# Patient Record
Sex: FEMALE | Race: WHITE | Employment: FULL TIME | URBAN - NONMETROPOLITAN AREA
[De-identification: names, ages, dates, MRNs, and addresses within clinical notes are randomized per-mention and may not be internally consistent; named-entity substitution may affect disease eponyms.]

---

## 2017-06-23 ENCOUNTER — OFFICE VISIT (OUTPATIENT)
Dept: OBGYN | Age: 57
End: 2017-06-23
Payer: COMMERCIAL

## 2017-06-23 VITALS
WEIGHT: 155 LBS | BODY MASS INDEX: 26.46 KG/M2 | SYSTOLIC BLOOD PRESSURE: 129 MMHG | HEIGHT: 64 IN | TEMPERATURE: 98.9 F | HEART RATE: 94 BPM | DIASTOLIC BLOOD PRESSURE: 92 MMHG

## 2017-06-23 DIAGNOSIS — Z12.4 SCREENING FOR CERVICAL CANCER: ICD-10-CM

## 2017-06-23 DIAGNOSIS — N81.10 FEMALE BLADDER PROLAPSE: ICD-10-CM

## 2017-06-23 DIAGNOSIS — Z01.419 WOMEN'S ANNUAL ROUTINE GYNECOLOGICAL EXAMINATION: Primary | ICD-10-CM

## 2017-06-23 DIAGNOSIS — Z12.31 ENCOUNTER FOR SCREENING MAMMOGRAM FOR BREAST CANCER: ICD-10-CM

## 2017-06-23 DIAGNOSIS — N90.4 LICHEN SCLEROSUS ET ATROPHICUS OF THE VULVA: ICD-10-CM

## 2017-06-23 DIAGNOSIS — N89.8 VAGINAL ITCHING: ICD-10-CM

## 2017-06-23 DIAGNOSIS — N81.6 POSTERIOR VAGINAL WALL PROLAPSE: ICD-10-CM

## 2017-06-23 DIAGNOSIS — N81.4 UTERINE PROLAPSE: ICD-10-CM

## 2017-06-23 PROCEDURE — 99203 OFFICE O/P NEW LOW 30 MIN: CPT | Performed by: OBSTETRICS & GYNECOLOGY

## 2017-06-23 PROCEDURE — 99386 PREV VISIT NEW AGE 40-64: CPT | Performed by: OBSTETRICS & GYNECOLOGY

## 2017-06-23 RX ORDER — CLOBETASOL PROPIONATE 0.5 MG/G
OINTMENT TOPICAL
Qty: 1 TUBE | Refills: 3 | Status: SHIPPED | OUTPATIENT
Start: 2017-06-23 | End: 2018-08-09

## 2017-06-23 RX ORDER — ERGOCALCIFEROL 1.25 MG/1
50000 CAPSULE ORAL
COMMUNITY
End: 2021-08-23

## 2017-06-23 ASSESSMENT — ENCOUNTER SYMPTOMS
GASTROINTESTINAL NEGATIVE: 1
RESPIRATORY NEGATIVE: 1
EYES NEGATIVE: 1
ALLERGIC/IMMUNOLOGIC NEGATIVE: 1

## 2017-06-27 ENCOUNTER — TELEPHONE (OUTPATIENT)
Dept: OBGYN | Age: 57
End: 2017-06-27

## 2017-07-03 ENCOUNTER — TELEPHONE (OUTPATIENT)
Dept: OBGYN | Age: 57
End: 2017-07-03

## 2017-07-14 ENCOUNTER — HOSPITAL ENCOUNTER (OUTPATIENT)
Dept: WOMENS IMAGING | Age: 57
Discharge: HOME | End: 2017-07-14
Payer: COMMERCIAL

## 2017-07-14 DIAGNOSIS — Z12.31 ENCOUNTER FOR SCREENING MAMMOGRAM FOR BREAST CANCER: ICD-10-CM

## 2017-07-14 PROCEDURE — 77063 BREAST TOMOSYNTHESIS BI: CPT

## 2017-08-07 ENCOUNTER — PROCEDURE VISIT (OUTPATIENT)
Dept: OBGYN | Age: 57
End: 2017-08-07
Payer: COMMERCIAL

## 2017-08-07 VITALS
DIASTOLIC BLOOD PRESSURE: 80 MMHG | BODY MASS INDEX: 26.29 KG/M2 | HEIGHT: 64 IN | WEIGHT: 154 LBS | HEART RATE: 97 BPM | TEMPERATURE: 99.1 F | SYSTOLIC BLOOD PRESSURE: 120 MMHG

## 2017-08-07 DIAGNOSIS — R87.810 CERVICAL HIGH RISK HPV (HUMAN PAPILLOMAVIRUS) TEST POSITIVE: Primary | ICD-10-CM

## 2017-08-07 PROCEDURE — 57456 ENDOCERV CURETTAGE W/SCOPE: CPT | Performed by: OBSTETRICS & GYNECOLOGY

## 2017-11-13 ENCOUNTER — TELEPHONE (OUTPATIENT)
Dept: OBGYN | Age: 57
End: 2017-11-13

## 2017-11-13 RX ORDER — FLUCONAZOLE 150 MG/1
150 TABLET ORAL DAILY
Qty: 4 TABLET | Refills: 0 | Status: SHIPPED | OUTPATIENT
Start: 2017-11-13 | End: 2018-08-09 | Stop reason: ALTCHOICE

## 2017-11-13 NOTE — TELEPHONE ENCOUNTER
Pt c/o S/s of yeast infection. She complains of vaginal discharge for 3 days and red itchy labia; non bloody discharge, without pelvic pain or abnormal vaginal bleeding. Antibiotics recently. Pharmacy verified.

## 2017-11-13 NOTE — TELEPHONE ENCOUNTER
patient believes she has a yeast infection, she would  like to see if she could get some medication are does she need appt.

## 2018-01-05 ENCOUNTER — TELEPHONE (OUTPATIENT)
Dept: OBGYN | Age: 58
End: 2018-01-05

## 2018-01-05 NOTE — TELEPHONE ENCOUNTER
Update from Dr. Meaghan Delaney's office:  --Patient was referred for bladder prolapse, also decided to have hysterectomy at the time of bladder surgery. --Pathology from cervix - [facility]  --Repeat pap in office - [facility]  --Colpo of vaginal cuff - [facility]    The Nurse Nicolle Dempsey, called our office to ask if we wanted to follow the patient for further care or refer her to a GYN/ONC. Dr. Felipe Parikh was contacted and will follow the patient here. Message was left on NP's personal cell phone.

## 2018-03-07 ENCOUNTER — OFFICE VISIT (OUTPATIENT)
Dept: GASTROENTEROLOGY | Facility: CLINIC | Age: 58
End: 2018-03-07

## 2018-03-07 VITALS
SYSTOLIC BLOOD PRESSURE: 128 MMHG | TEMPERATURE: 98 F | HEART RATE: 70 BPM | WEIGHT: 157 LBS | OXYGEN SATURATION: 99 % | BODY MASS INDEX: 26.8 KG/M2 | DIASTOLIC BLOOD PRESSURE: 80 MMHG | HEIGHT: 64 IN

## 2018-03-07 DIAGNOSIS — Z86.010 HISTORY OF COLON POLYPS: Primary | ICD-10-CM

## 2018-03-07 DIAGNOSIS — Z80.0 FAMILY HISTORY OF COLON CANCER: ICD-10-CM

## 2018-03-07 PROBLEM — Z86.0100 HISTORY OF COLON POLYPS: Status: ACTIVE | Noted: 2018-03-07

## 2018-03-07 PROCEDURE — S0285 CNSLT BEFORE SCREEN COLONOSC: HCPCS | Performed by: NURSE PRACTITIONER

## 2018-03-07 RX ORDER — QUINAPRIL HCL AND HYDROCHLOROTHIAZIDE 20; 12.5 MG/1; MG/1
1 TABLET ORAL DAILY
COMMUNITY
End: 2020-12-08

## 2018-03-07 NOTE — PROGRESS NOTES
Chief Complaint   Patient presents with   • Colonoscopy     4-1-14 colon had seven polyps     Subjective   HPI    Martha Parra is a 57 y.o. female who presents to office for preventative maintenance.  There is  a personal history of colon polyps.  There is not a history of colon cancer.  She does not have complaints of nausea/vomiting, change in bowels, weight loss, no BRBPR, no melena.  There is a family history of colon cancer-mother.  There is not a family history of colon polyps.  Pt last colonoscopy-2014 .  Bowels do move on regular basis.    CScope (Dr Farris) 2014 hyperplastic rectal polyp - total of 7 polyp removed from rectum      Past Medical History:   Diagnosis Date   • Diabetes    • Hypertension      Past Surgical History:   Procedure Laterality Date   • COLONOSCOPY W/ POLYPECTOMY  03/28/2014    several rectal polyps which were fairly nonworrisome and all removed   • HYSTERECTOMY     • TUBAL ABDOMINAL LIGATION       Outpatient Prescriptions Marked as Taking for the 3/7/18 encounter (Office Visit) with SMITHA Dowell   Medication Sig Dispense Refill   • atorvastatin (LIPITOR) 10 MG tablet Take 10 mg by mouth daily.     • clotrimazole-betamethasone (LOTRISONE) 1-0.05 % cream Apply  topically 2 (Two) Times a Day. 1 each 3   • conjugated estrogens (PREMARIN) 0.625 MG/GM vaginal cream Insert  into the vagina 3 (Three) Times a Week. 1 each 3   • meloxicam (MOBIC) 15 MG tablet 1 TABLET BY MOUTH DAILY  2   • metFORMIN (GLUCOPHAGE) 500 MG tablet Take 500 mg by mouth 2 times daily (with meals).     • quinapril (ACCUPRIL) 20 MG tablet Take 20 mg by mouth nightly.     • quinapril-hydrochlorothiazide (ACCURETIC) 20-12.5 MG per tablet Take 1 tablet by mouth Daily.     • tolterodine LA (DETROL LA) 4 MG 24 hr capsule Take 4 mg by mouth daily.     • vitamin D (ERGOCALCIFEROL) 90317 UNITS capsule capsule TAKE ONE CAPSULE BY MOUTH TWICE A WEEK  5     No Known Allergies  Social History     Social History    • Marital status:      Spouse name: N/A   • Number of children: N/A   • Years of education: N/A     Occupational History   • Not on file.     Social History Main Topics   • Smoking status: Never Smoker   • Smokeless tobacco: Never Used   • Alcohol use No   • Drug use: No   • Sexual activity: Not Currently     Other Topics Concern   • Not on file     Social History Narrative     Family History   Problem Relation Age of Onset   • Heart disease Father    • Colon cancer Mother    • No Known Problems Sister    • No Known Problems Son    • No Known Problems Daughter    • Colon cancer Maternal Aunt      Review of Systems   Constitutional: Negative for fatigue, fever and unexpected weight change.   HENT: Negative for hearing loss, sore throat and voice change.    Eyes: Negative for visual disturbance.   Respiratory: Negative for cough, shortness of breath and wheezing.    Cardiovascular: Negative for chest pain and palpitations.   Gastrointestinal: Negative for abdominal pain, blood in stool and vomiting.   Endocrine: Negative for polydipsia and polyuria.   Genitourinary: Negative for difficulty urinating, dysuria, hematuria and urgency.   Musculoskeletal: Negative for joint swelling and myalgias.   Skin: Negative for color change, rash and wound.   Neurological: Negative for dizziness, tremors, seizures and syncope.   Hematological: Does not bruise/bleed easily.   Psychiatric/Behavioral: Negative for agitation and confusion. The patient is not nervous/anxious.      Objective   Vitals:    03/07/18 0806   BP: 128/80   Pulse: 70   Temp: 98 °F (36.7 °C)   SpO2: 99%     Physical Exam   Constitutional: She is oriented to person, place, and time. She appears well-developed and well-nourished.   HENT:   Head: Normocephalic and atraumatic.   Eyes: Conjunctivae are normal. Pupils are equal, round, and reactive to light. No scleral icterus.   Neck: No JVD present. No thyroid mass and no thyromegaly present.   Cardiovascular:  Normal rate, regular rhythm and normal heart sounds.  Exam reveals no gallop and no friction rub.    No murmur heard.  Pulmonary/Chest: Effort normal and breath sounds normal. No accessory muscle usage. No respiratory distress. She has no wheezes. She has no rales.   Abdominal: Soft. Bowel sounds are normal. She exhibits no distension, no ascites and no mass. There is no splenomegaly or hepatomegaly. There is no tenderness. There is no rebound and no guarding.   Genitourinary:   Genitourinary Comments: Rectal-Did not examine   Musculoskeletal: Normal range of motion. She exhibits no edema.   Neurological: She is alert and oriented to person, place, and time.   Deemed a reliable historian, able to converse without difficulty and able to move all extremities without difficulty   Skin: Skin is warm and dry.   Psychiatric: She has a normal mood and affect. Her behavior is normal.     Imaging Results (most recent)     None        Body mass index is 26.95 kg/(m^2).    Assessment/Plan   Martha was seen today for colonoscopy.    Diagnoses and all orders for this visit:    History of colon polyps  -     Case Request; Standing  -     Implement Anesthesia Orders Day of Procedure; Standing  -     Obtain Informed Consent; Standing  -     Sod Picosulfate-Mag Ox-Cit Acd (CLENPIQ) 10-3.5-12 MG-GM -GM/160ML solution; Take 1 each by mouth Take As Directed.  -     Case Request    Family history of colon cancer  Comments:  mother      COLONOSCOPY WITH ANESTHESIA (N/A)    Advised pt to stop ASA day prior to procedure and to stop use of NSAIDs, Fish Oil, and MV 5 days prior to procedure.  Tylenol based products are ok to take.  Pt verbalized understanding.    All risks, benefits, alternatives, and indications of colonoscopy procedure have been discussed with the patient. Risks to include perforation of the colon requiring possible surgery or colostomy, risk of bleeding from biopsies or removal of colon tissue, possibility of missing a  colon polyp or cancer, or adverse drug reaction.  Benefits to include the diagnosis and management of disease of the colon and rectum. Alternatives to include barium enema, radiographic evaluation, lab testing or no intervention. Pt verbalizes understanding and agrees.     Patient's BMI is above normal parameters. Follow-up plan includes:  no follow-up required.        There are no Patient Instructions on file for this visit.

## 2018-04-18 ENCOUNTER — ANESTHESIA (OUTPATIENT)
Dept: GASTROENTEROLOGY | Facility: HOSPITAL | Age: 58
End: 2018-04-18

## 2018-04-18 ENCOUNTER — HOSPITAL ENCOUNTER (OUTPATIENT)
Facility: HOSPITAL | Age: 58
Setting detail: HOSPITAL OUTPATIENT SURGERY
Discharge: HOME | End: 2018-04-18
Attending: INTERNAL MEDICINE | Admitting: INTERNAL MEDICINE

## 2018-04-18 ENCOUNTER — ANESTHESIA EVENT (OUTPATIENT)
Dept: GASTROENTEROLOGY | Facility: HOSPITAL | Age: 58
End: 2018-04-18

## 2018-04-18 ENCOUNTER — TELEPHONE (OUTPATIENT)
Dept: GASTROENTEROLOGY | Facility: CLINIC | Age: 58
End: 2018-04-18

## 2018-04-18 VITALS
RESPIRATION RATE: 14 BRPM | TEMPERATURE: 97.2 F | BODY MASS INDEX: 27.66 KG/M2 | OXYGEN SATURATION: 99 % | HEART RATE: 75 BPM | HEIGHT: 64 IN | WEIGHT: 162 LBS | DIASTOLIC BLOOD PRESSURE: 66 MMHG | SYSTOLIC BLOOD PRESSURE: 130 MMHG

## 2018-04-18 DIAGNOSIS — Z86.010 HISTORY OF COLON POLYPS: ICD-10-CM

## 2018-04-18 LAB — GLUCOSE BLDC GLUCOMTR-MCNC: 142 MG/DL (ref 70–130)

## 2018-04-18 PROCEDURE — 45385 COLONOSCOPY W/LESION REMOVAL: CPT | Performed by: INTERNAL MEDICINE

## 2018-04-18 PROCEDURE — 25010000002 PROPOFOL 10 MG/ML EMULSION: Performed by: NURSE ANESTHETIST, CERTIFIED REGISTERED

## 2018-04-18 PROCEDURE — 82962 GLUCOSE BLOOD TEST: CPT

## 2018-04-18 PROCEDURE — 88305 TISSUE EXAM BY PATHOLOGIST: CPT | Performed by: INTERNAL MEDICINE

## 2018-04-18 RX ORDER — SODIUM CHLORIDE 0.9 % (FLUSH) 0.9 %
3 SYRINGE (ML) INJECTION AS NEEDED
Status: DISCONTINUED | OUTPATIENT
Start: 2018-04-18 | End: 2018-04-18 | Stop reason: HOSPADM

## 2018-04-18 RX ORDER — LIDOCAINE HYDROCHLORIDE 20 MG/ML
INJECTION, SOLUTION INFILTRATION; PERINEURAL AS NEEDED
Status: DISCONTINUED | OUTPATIENT
Start: 2018-04-18 | End: 2018-04-18 | Stop reason: SURG

## 2018-04-18 RX ORDER — SODIUM CHLORIDE 9 MG/ML
500 INJECTION, SOLUTION INTRAVENOUS CONTINUOUS PRN
Status: DISCONTINUED | OUTPATIENT
Start: 2018-04-18 | End: 2018-04-18 | Stop reason: HOSPADM

## 2018-04-18 RX ORDER — PROPOFOL 10 MG/ML
VIAL (ML) INTRAVENOUS AS NEEDED
Status: DISCONTINUED | OUTPATIENT
Start: 2018-04-18 | End: 2018-04-18 | Stop reason: SURG

## 2018-04-18 RX ADMIN — LIDOCAINE HYDROCHLORIDE 50 MG: 20 INJECTION, SOLUTION INFILTRATION; PERINEURAL at 08:22

## 2018-04-18 RX ADMIN — PROPOFOL 200 MG: 10 INJECTION, EMULSION INTRAVENOUS at 08:22

## 2018-04-18 RX ADMIN — SODIUM CHLORIDE: 0.9 INJECTION, SOLUTION INTRAVENOUS at 08:14

## 2018-04-18 NOTE — ANESTHESIA POSTPROCEDURE EVALUATION
Patient: Martha Parra    Procedure Summary     Date:  04/18/18 Room / Location:  [facility] ENDOSCOPY 2 /  [facility]    Anesthesia Start:  0820 Anesthesia Stop:  0842    Procedure:  COLONOSCOPY WITH ANESTHESIA (N/A ) Diagnosis:       History of colon polyps      (History of colon polyps [Z86.010])    Surgeon:  Chandler Farris DO Provider:  Ruperto Rodríguez CRNA    Anesthesia Type:  general ASA Status:  2          Anesthesia Type: general  Last vitals  BP   120/78 (04/18/18 0734)   Temp   97.2 °F (36.2 °C) (04/18/18 0734)   Pulse   75 (04/18/18 0734)   Resp   20 (04/18/18 0734)     SpO2   98 % (04/18/18 0734)     Post Anesthesia Care and Evaluation    Patient location during evaluation: PHASE II  Level of consciousness: awake and alert  Pain management: adequate  Airway patency: patent  Anesthetic complications: No anesthetic complications    Cardiovascular status: acceptable  Respiratory status: acceptable  Hydration status: acceptable

## 2018-04-18 NOTE — ANESTHESIA PREPROCEDURE EVALUATION
Anesthesia Evaluation     Patient summary reviewed   no history of anesthetic complications:  NPO Solid Status: > 8 hours  NPO Liquid Status: > 4 hours           Airway   Mallampati: I  TM distance: >3 FB  Neck ROM: full  No difficulty expected  Dental - normal exam     Pulmonary    (+) sleep apnea (noncompliant with cpap),   (-) asthma, not a smoker  Cardiovascular   Exercise tolerance: good (4-7 METS)    (+) hypertension, hyperlipidemia,   (-) past MI, angina      Neuro/Psych  (-) seizures, TIA, CVA  GI/Hepatic/Renal/Endo    (+)   diabetes mellitus,   (-) liver disease, no renal disease    Musculoskeletal     Abdominal    Substance History      OB/GYN          Other   (+) arthritis                     Anesthesia Plan    ASA 2     general   total IV anesthesia  intravenous induction   Anesthetic plan and risks discussed with patient.

## 2018-04-18 NOTE — H&P
[facility]  Pre Procedure History & Physical    Chief Complaint:   Polyps    Subjective     HPI:   pOLYPS    Past Medical History:   Past Medical History:   Diagnosis Date   • Arthritis    • Diabetes    • Hyperlipidemia    • Hypertension        Past Surgical History:  Past Surgical History:   Procedure Laterality Date   • COLONOSCOPY W/ POLYPECTOMY  03/28/2014    several rectal polyps which were fairly nonworrisome and all removed   • HYSTERECTOMY     • TUBAL ABDOMINAL LIGATION         Family History:  Family History   Problem Relation Age of Onset   • Heart disease Father    • Colon cancer Mother    • No Known Problems Sister    • No Known Problems Son    • No Known Problems Daughter    • Colon cancer Maternal Aunt        Social History:   reports that she has never smoked. She has never used smokeless tobacco. She reports that she does not drink alcohol or use drugs.    Medications:   Prior to Admission medications    Medication Sig Start Date End Date Taking? Authorizing Provider   atorvastatin (LIPITOR) 10 MG tablet Take 10 mg by mouth daily.   Yes Historical Provider, MD   meloxicam (MOBIC) 15 MG tablet 1 TABLET BY MOUTH DAILY 11/4/16  Yes Historical Provider, MD   metFORMIN (GLUCOPHAGE) 500 MG tablet Take 500 mg by mouth 2 times daily (with meals).   Yes Historical Provider, MD   quinapril (ACCUPRIL) 20 MG tablet Take 20 mg by mouth nightly.   Yes Historical Provider, MD   quinapril-hydrochlorothiazide (ACCURETIC) 20-12.5 MG per tablet Take 1 tablet by mouth Daily.   Yes Historical Provider, MD   Sod Picosulfate-Mag Ox-Cit Acd (CLENPIQ) 10-3.5-12 MG-GM -GM/160ML solution Take 1 each by mouth Take As Directed. 3/7/18  Yes SMITHA Dowell   clotrimazole-betamethasone (LOTRISONE) 1-0.05 % cream Apply  topically 2 (Two) Times a Day. 11/29/16   Myron Desai MD   conjugated estrogens (PREMARIN) 0.625 MG/GM vaginal cream Insert  into the vagina 3 (Three) Times a Week. 11/30/16    "Myron Desai MD   tolterodine LA (DETROL LA) 4 MG 24 hr capsule Take 4 mg by mouth daily.    Historical Provider, MD   vitamin D (ERGOCALCIFEROL) 83688 UNITS capsule capsule TAKE ONE CAPSULE BY MOUTH TWICE A WEEK 11/2/16   Historical Provider, MD       Allergies:  Review of patient's allergies indicates no known allergies.    ROS:    General: Weight stable  Resp: No SOA  Cardiovascular: No CP    Objective     Blood pressure 120/78, pulse 75, temperature 97.2 °F (36.2 °C), temperature source Temporal Artery , resp. rate 20, height 162.6 cm (64\"), weight 73.5 kg (162 lb), SpO2 98 %, not currently breastfeeding.    Physical Exam   Constitutional: Pt is oriented to person, place, and in no distress.   HENT: Mouth/Throat: Oropharynx is clear.   Cardiovascular: Normal rate, regular rhythm.    Pulmonary/Chest: Effort normal. No respiratory distress. No  wheezes.   Abdominal: Soft. Non-distended.  Skin: Skin is warm and dry.   Psychiatric: Mood, memory, affect and judgment appear normal.     Assessment/Plan     Diagnosis:  pOLYPS    Anticipated Surgical Procedure:  c-SCOPE    The risks, benefits, and alternatives of this procedure have been discussed with the patient or the responsible party- the patient understands and agrees to proceed.        "

## 2018-04-19 LAB
CYTO UR: NORMAL
LAB AP CASE REPORT: NORMAL
LAB AP CLINICAL INFORMATION: NORMAL
Lab: NORMAL
PATH REPORT.FINAL DX SPEC: NORMAL
PATH REPORT.GROSS SPEC: NORMAL

## 2018-06-11 ENCOUNTER — OFFICE VISIT (OUTPATIENT)
Dept: OTOLARYNGOLOGY | Facility: CLINIC | Age: 58
End: 2018-06-11

## 2018-06-11 ENCOUNTER — PROCEDURE VISIT (OUTPATIENT)
Dept: OTOLARYNGOLOGY | Facility: CLINIC | Age: 58
End: 2018-06-11

## 2018-06-11 VITALS
BODY MASS INDEX: 27.31 KG/M2 | TEMPERATURE: 98 F | DIASTOLIC BLOOD PRESSURE: 75 MMHG | SYSTOLIC BLOOD PRESSURE: 123 MMHG | RESPIRATION RATE: 20 BRPM | HEART RATE: 82 BPM | WEIGHT: 160 LBS | HEIGHT: 64 IN

## 2018-06-11 DIAGNOSIS — H93.A9 SUBJECTIVE PULSATILE TINNITUS: ICD-10-CM

## 2018-06-11 DIAGNOSIS — H93.A9 SUBJECTIVE PULSATILE TINNITUS: Primary | ICD-10-CM

## 2018-06-11 DIAGNOSIS — J30.9 ALLERGIC RHINITIS, UNSPECIFIED CHRONICITY, UNSPECIFIED SEASONALITY, UNSPECIFIED TRIGGER: ICD-10-CM

## 2018-06-11 DIAGNOSIS — R26.89 IMBALANCE: Primary | ICD-10-CM

## 2018-06-11 DIAGNOSIS — H90.3 SENSORINEURAL HEARING LOSS (SNHL) OF BOTH EARS: ICD-10-CM

## 2018-06-11 DIAGNOSIS — R26.89 IMBALANCE: ICD-10-CM

## 2018-06-11 PROCEDURE — 92567 TYMPANOMETRY: CPT | Performed by: AUDIOLOGIST-HEARING AID FITTER

## 2018-06-11 PROCEDURE — 92557 COMPREHENSIVE HEARING TEST: CPT | Performed by: AUDIOLOGIST-HEARING AID FITTER

## 2018-06-11 PROCEDURE — 99214 OFFICE O/P EST MOD 30 MIN: CPT | Performed by: NURSE PRACTITIONER

## 2018-06-11 NOTE — PATIENT INSTRUCTIONS
###### BMI  #####   MyPlate from USDA  The general, healthful diet is based on the 2010 Dietary Guidelines for Americans. The amount of food you need to eat from each food group depends on your age, sex, and level of physical activity and can be individualized by a dietitian. Go to ChooseMyPlate.gov for more information.  What do I need to know about the MyPlate plan?  · Enjoy your food, but eat less.  · Avoid oversized portions.  ¨ ½ of your plate should include fruits and vegetables.  ¨ ¼ of your plate should be grains.  ¨ ¼ of your plate should be protein.  Grains   · Make at least half of your grains whole grains.  · For a 2,000 calorie daily food plan, eat 6 oz every day.  · 1 oz is about 1 slice bread, 1 cup cereal, or ½ cup cooked rice, cereal, or pasta.  Vegetables   · Make half your plate fruits and vegetables.  · For a 2,000 calorie daily food plan, eat 2½ cups every day.  · 1 cup is about 1 cup raw or cooked vegetables or vegetable juice or 2 cups raw leafy greens.  Fruits   · Make half your plate fruits and vegetables.  · For a 2,000 calorie daily food plan, eat 2 cups every day.  · 1 cup is about 1 cup fruit or 100% fruit juice or ½ cup dried fruit.  Protein   · For a 2,000 calorie daily food plan, eat 5½ oz every day.  · 1 oz is about 1 oz meat, poultry, or fish, ¼ cup cooked beans, 1 egg, 1 Tbsp peanut butter, or ½ oz nuts or seeds.  Dairy   · Switch to fat-free or low-fat (1%) milk.  · For a 2,000 calorie daily food plan, eat 3 cups every day.  · 1 cup is about 1 cup milk or yogurt or soy milk (soy beverage), 1½ oz natural cheese, or 2 oz processed cheese.  Fats, Oils, and Empty Calories   · Only small amounts of oils are recommended.  · Empty calories are calories from solid fats or added sugars.  · Compare sodium in foods like soup, bread, and frozen meals. Choose the foods with lower numbers.  · Drink water instead of sugary drinks.  What foods can I eat?  Grains   Whole grains such as whole  wheat, quinoa, millet, and bulgur. Bread, rolls, and pasta made from whole grains. Brown or wild rice. Hot or cold cereals made from whole grains and without added sugar.  Vegetables   All fresh vegetables, especially fresh red, dark green, or orange vegetables. Peas and beans. Low-sodium frozen or canned vegetables prepared without added salt. Low-sodium vegetable juices.  Fruits   All fresh, frozen, and dried fruits. Canned fruit packed in water or fruit juice without added sugar. Fruit juices without added sugar.  Meats and Other Protein Sources   Boiled, baked, or grilled lean meat trimmed of fat. Skinless poultry. Fresh seafood and shellfish. Canned seafood packed in water. Unsalted nuts and unsalted nut butters. Tofu. Dried beans and pea. Eggs.  Dairy   Low-fat or fat-free milk, yogurt, and cheeses.  Sweets and Desserts   Frozen desserts made from low-fat milk.  Fats and Oils   Olive, peanut, and canola oils and margarine. Salad dressing and mayonnaise made from these oils.  Other   Soups and casseroles made from allowed ingredients and without added fat or salt.  The items listed above may not be a complete list of recommended foods or beverages. Contact your dietitian for more options.   What foods are not recommended?  Grains   Sweetened, low-fiber cereals. Packaged baked goods. Snack crackers and chips. Cheese crackers, butter crackers, and biscuits. Frozen waffles, sweet breads, doughnuts, pastries, packaged baking mixes, pancakes, cakes, and cookies.  Vegetables   Regular canned or frozen vegetables or vegetables prepared with salt. Canned tomatoes. Canned tomato sauce. Fried vegetables. Vegetables in cream sauce or cheese sauce.  Fruits   Fruits packed in syrup or made with added sugar.  Meats and Other Protein Sources   Marbled or fatty meats such as ribs. Poultry with skin. Fried meats, poultry, eggs, or fish. Sausages, hot dogs, and deli meats such as pastrami, bologna, or salami.  Dairy   Whole  milk, cream, cheeses made from whole milk, sour cream. Ice cream or yogurt made from whole milk or with added sugar.  Beverages   For adults, no more than one alcoholic drink per day. Regular soft drinks or other sugary beverages. Juice drinks.  Sweets and Desserts   Sugary or fatty desserts, candy, and other sweets.  Fats and Oils   Solid shortening or partially hydrogenated oils. Solid margarine. Margarine that contains trans fats. Butter.  The items listed above may not be a complete list of foods and beverages to avoid. Contact your dietitian for more information.   This information is not intended to replace advice given to you by your health care provider. Make sure you discuss any questions you have with your health care provider.  Document Released: 01/06/2009 Document Revised: 05/25/2017 Document Reviewed: 11/26/2014  [employer] Interactive Patient Education © 2017 [employer]     Calorie Counting for Weight Loss  Calories are units of energy. Your body needs a certain amount of calories from food to keep you going throughout the day. When you eat more calories than your body needs, your body stores the extra calories as fat. When you eat fewer calories than your body needs, your body burns fat to get the energy it needs.  Calorie counting means keeping track of how many calories you eat and drink each day. Calorie counting can be helpful if you need to lose weight. If you make sure to eat fewer calories than your body needs, you should lose weight. Ask your health care provider what a healthy weight is for you.  For calorie counting to work, you will need to eat the right number of calories in a day in order to lose a healthy amount of weight per week. A dietitian can help you determine how many calories you need in a day and will give you suggestions on how to reach your calorie goal.  · A healthy amount of weight to lose per week is usually 1-2 lb (0.5-0.9 kg). This usually means that your daily calorie  intake should be reduced by 500-750 calories.  · Eating 1,200 - 1,500 calories per day can help most women lose weight.  · Eating 1,500 - 1,800 calories per day can help most men lose weight.  What is my plan?  My goal is to have __________ calories per day.  If I have this many calories per day, I should lose around __________ pounds per week.  What do I need to know about calorie counting?  In order to meet your daily calorie goal, you will need to:  · Find out how many calories are in each food you would like to eat. Try to do this before you eat.  · Decide how much of the food you plan to eat.  · Write down what you ate and how many calories it had. Doing this is called keeping a food log.  To successfully lose weight, it is important to balance calorie counting with a healthy lifestyle that includes regular activity. Aim for 150 minutes of moderate exercise (such as walking) or 75 minutes of vigorous exercise (such as running) each week.  Where do I find calorie information?     The number of calories in a food can be found on a Nutrition Facts label. If a food does not have a Nutrition Facts label, try to look up the calories online or ask your dietitian for help.  Remember that calories are listed per serving. If you choose to have more than one serving of a food, you will have to multiply the calories per serving by the amount of servings you plan to eat. For example, the label on a package of bread might say that a serving size is 1 slice and that there are 90 calories in a serving. If you eat 1 slice, you will have eaten 90 calories. If you eat 2 slices, you will have eaten 180 calories.  How do I keep a food log?  Immediately after each meal, record the following information in your food log:  · What you ate. Don't forget to include toppings, sauces, and other extras on the food.  · How much you ate. This can be measured in cups, ounces, or number of items.  · How many calories each food and drink  "had.  · The total number of calories in the meal.  Keep your food log near you, such as in a small notebook in your pocket, or use a mobile gloria or website. Some programs will calculate calories for you and show you how many calories you have left for the day to meet your goal.  What are some calorie counting tips?  · Use your calories on foods and drinks that will fill you up and not leave you hungry:  ¨ Some examples of foods that fill you up are nuts and nut butters, vegetables, lean proteins, and high-fiber foods like whole grains. High-fiber foods are foods with more than 5 g fiber per serving.  ¨ Drinks such as sodas, specialty coffee drinks, alcohol, and juices have a lot of calories, yet do not fill you up.  · Eat nutritious foods and avoid empty calories. Empty calories are calories you get from foods or beverages that do not have many vitamins or protein, such as candy, sweets, and soda. It is better to have a nutritious high-calorie food (such as an avocado) than a food with few nutrients (such as a bag of chips).  · Know how many calories are in the foods you eat most often. This will help you calculate calorie counts faster.  · Pay attention to calories in drinks. Low-calorie drinks include water and unsweetened drinks.  · Pay attention to nutrition labels for \"low fat\" or \"fat free\" foods. These foods sometimes have the same amount of calories or more calories than the full fat versions. They also often have added sugar, starch, or salt, to make up for flavor that was removed with the fat.  · Find a way of tracking calories that works for you. Get creative. Try different apps or programs if writing down calories does not work for you.  What are some portion control tips?  · Know how many calories are in a serving. This will help you know how many servings of a certain food you can have.  · Use a measuring cup to measure serving sizes. You could also try weighing out portions on a kitchen scale. With " time, you will be able to estimate serving sizes for some foods.  · Take some time to put servings of different foods on your favorite plates, bowls, and cups so you know what a serving looks like.  · Try not to eat straight from a bag or box. Doing this can lead to overeating. Put the amount you would like to eat in a cup or on a plate to make sure you are eating the right portion.  · Use smaller plates, glasses, and bowls to prevent overeating.  · Try not to multitask (for example, watch TV or use your computer) while eating. If it is time to eat, sit down at a table and enjoy your food. This will help you to know when you are full. It will also help you to be aware of what you are eating and how much you are eating.  What are tips for following this plan?  Reading food labels   · Check the calorie count compared to the serving size. The serving size may be smaller than what you are used to eating.  · Check the source of the calories. Make sure the food you are eating is high in vitamins and protein and low in saturated and trans fats.  Shopping   · Read nutrition labels while you shop. This will help you make healthy decisions before you decide to purchase your food.  · Make a grocery list and stick to it.  Cooking   · Try to cook your favorite foods in a healthier way. For example, try baking instead of frying.  · Use low-fat dairy products.  Meal planning   · Use more fruits and vegetables. Half of your plate should be fruits and vegetables.  · Include lean proteins like poultry and fish.  How do I count calories when eating out?  · Ask for smaller portion sizes.  · Consider sharing an entree and sides instead of getting your own entree.  · If you get your own entree, eat only half. Ask for a box at the beginning of your meal and put the rest of your entree in it so you are not tempted to eat it.  · If calories are listed on the menu, choose the lower calorie options.  · Choose dishes that include vegetables,  fruits, whole grains, low-fat dairy products, and lean protein.  · Choose items that are boiled, broiled, grilled, or steamed. Stay away from items that are buttered, battered, fried, or served with cream sauce. Items labeled “crispy” are usually fried, unless stated otherwise.  · Choose water, low-fat milk, unsweetened iced tea, or other drinks without added sugar. If you want an alcoholic beverage, choose a lower calorie option such as a glass of wine or light beer.  · Ask for dressings, sauces, and syrups on the side. These are usually high in calories, so you should limit the amount you eat.  · If you want a salad, choose a garden salad and ask for grilled meats. Avoid extra toppings like nesbitt, cheese, or fried items. Ask for the dressing on the side, or ask for olive oil and vinegar or lemon to use as dressing.  · Estimate how many servings of a food you are given. For example, a serving of cooked rice is ½ cup or about the size of half a baseball. Knowing serving sizes will help you be aware of how much food you are eating at restaurants. The list below tells you how big or small some common portion sizes are based on everyday objects:  ¨ 1 oz--4 stacked dice.  ¨ 3 oz--1 deck of cards.  ¨ 1 tsp--1 die.  ¨ 1 Tbsp--½ a ping-pong ball.  ¨ 2 Tbsp--1 ping-pong ball.  ¨ ½ cup--½ baseball.  ¨ 1 cup--1 baseball.  Summary  · Calorie counting means keeping track of how many calories you eat and drink each day. If you eat fewer calories than your body needs, you should lose weight.  · A healthy amount of weight to lose per week is usually 1-2 lb (0.5-0.9 kg). This usually means reducing your daily calorie intake by 500-750 calories.  · The number of calories in a food can be found on a Nutrition Facts label. If a food does not have a Nutrition Facts label, try to look up the calories online or ask your dietitian for help.  · Use your calories on foods and drinks that will fill you up, and not on foods and drinks that  will leave you hungry.  · Use smaller plates, glasses, and bowls to prevent overeating.  This information is not intended to replace advice given to you by your health care provider. Make sure you discuss any questions you have with your health care provider.  Document Released: 12/18/2006 Document Revised: 11/17/2017 Document Reviewed: 11/17/2017  [employer] Interactive Patient Education © 2017 [employer]     Exercising to Lose Weight  Exercising can help you to lose weight. In order to lose weight through exercise, you need to do vigorous-intensity exercise. You can tell that you are exercising with vigorous intensity if you are breathing very hard and fast and cannot hold a conversation while exercising.  Moderate-intensity exercise helps to maintain your current weight. You can tell that you are exercising at a moderate level if you have a higher heart rate and faster breathing, but you are still able to hold a conversation.  How often should I exercise?  Choose an activity that you enjoy and set realistic goals. Your health care provider can help you to make an activity plan that works for you. Exercise regularly as directed by your health care provider. This may include:  · Doing resistance training twice each week, such as:  ¨ Push-ups.  ¨ Sit-ups.  ¨ Lifting weights.  ¨ Using resistance bands.  · Doing a given intensity of exercise for a given amount of time. Choose from these options:  ¨ 150 minutes of moderate-intensity exercise every week.  ¨ 75 minutes of vigorous-intensity exercise every week.  ¨ A mix of moderate-intensity and vigorous-intensity exercise every week.  Children, pregnant women, people who are out of shape, people who are overweight, and older adults may need to consult a health care provider for individual recommendations. If you have any sort of medical condition, be sure to consult your health care provider before starting a new exercise program.  What are some activities that can  help me to lose weight?  · Walking at a rate of at least 4.5 miles an hour.  · Jogging or running at a rate of 5 miles per hour.  · Biking at a rate of at least 10 miles per hour.  · Lap swimming.  · Roller-skating or in-line skating.  · Cross-country skiing.  · Vigorous competitive sports, such as football, basketball, and soccer.  · Jumping rope.  · Aerobic dancing.  How can I be more active in my day-to-day activities?  · Use the stairs instead of the elevator.  · Take a walk during your lunch break.  · If you drive, park your car farther away from work or school.  · If you take public transportation, get off one stop early and walk the rest of the way.  · Make all of your phone calls while standing up and walking around.  · Get up, stretch, and walk around every 30 minutes throughout the day.  What guidelines should I follow while exercising?  · Do not exercise so much that you hurt yourself, feel dizzy, or get very short of breath.  · Consult your health care provider prior to starting a new exercise program.  · Wear comfortable clothes and shoes with good support.  · Drink plenty of water while you exercise to prevent dehydration or heat stroke. Body water is lost during exercise and must be replaced.  · Work out until you breathe faster and your heart beats faster.  This information is not intended to replace advice given to you by your health care provider. Make sure you discuss any questions you have with your health care provider.  Document Released: 01/20/2012 Document Revised: 05/25/2017 Document Reviewed: 05/21/2015  [employer] Interactive Patient Education © 2017 [employer]

## 2018-06-11 NOTE — PATIENT INSTRUCTIONS
(1) See the medical provider as scheduled due to patient complaints.  (2) Receive audiological testing as needed.

## 2018-06-11 NOTE — PROGRESS NOTES
PRIMARY CARE PROVIDER: Ruperto Hendricks MD  REFERRING PROVIDER: Ruperto Hendricks MD    Chief Complaint   Patient presents with   • Ear Problem       Subjective   History of Present Illness:  Martha Parra is a  57 y.o. female who complains of pulsitile tinnitus. The symptoms are localized to both ears. The patient has had moderate symptoms. The symptoms have been present for the last 6 months. The symptoms are worsened by bending over, but can occur spontaneously as well.  There have been no factors that have improved the symptoms. She denies otalgia, otorrhea, dizziness, vertigo, or change in hearing. She also reports a history of imbalance.    Review of Systems:  Review of Systems   Constitutional: Negative for chills and fever.   HENT: Positive for rhinorrhea and tinnitus. Negative for congestion, ear discharge, ear pain, mouth sores, sinus pressure, sneezing, sore throat and voice change.    Musculoskeletal: Positive for arthralgias.   Neurological: Negative for dizziness and syncope.   All other systems reviewed and are negative.      Past History:  Past Medical History:   Diagnosis Date   • Arthritis    • Diabetes    • Hyperlipidemia    • Hypertension      Past Surgical History:   Procedure Laterality Date   • COLONOSCOPY N/A 4/18/2018    Procedure: COLONOSCOPY WITH ANESTHESIA;  Surgeon: Chandler Farris DO;  Location: [facility] ENDOSCOPY;  Service: Gastroenterology   • COLONOSCOPY W/ POLYPECTOMY  03/28/2014    several rectal polyps which were fairly nonworrisome and all removed   • HYSTERECTOMY     • TUBAL ABDOMINAL LIGATION       Family History   Problem Relation Age of Onset   • Heart disease Father    • Colon cancer Mother    • No Known Problems Sister    • No Known Problems Son    • No Known Problems Daughter    • Colon cancer Maternal Aunt      Social History   Substance Use Topics   • Smoking status: Never Smoker   • Smokeless tobacco: Never Used   • Alcohol use No     Allergies:  Patient  has no known allergies.    Current Outpatient Prescriptions:   •  atorvastatin (LIPITOR) 10 MG tablet, Take 10 mg by mouth daily., Disp: , Rfl:   •  clotrimazole-betamethasone (LOTRISONE) 1-0.05 % cream, Apply  topically 2 (Two) Times a Day., Disp: 1 each, Rfl: 3  •  conjugated estrogens (PREMARIN) 0.625 MG/GM vaginal cream, Insert  into the vagina 3 (Three) Times a Week., Disp: 1 each, Rfl: 3  •  meloxicam (MOBIC) 15 MG tablet, 1 TABLET BY MOUTH DAILY, Disp: , Rfl: 2  •  metFORMIN (GLUCOPHAGE) 500 MG tablet, Take 500 mg by mouth 2 times daily (with meals)., Disp: , Rfl:   •  quinapril (ACCUPRIL) 20 MG tablet, Take 20 mg by mouth nightly., Disp: , Rfl:   •  quinapril-hydrochlorothiazide (ACCURETIC) 20-12.5 MG per tablet, Take 1 tablet by mouth Daily., Disp: , Rfl:   •  Sod Picosulfate-Mag Ox-Cit Acd (CLENPIQ) 10-3.5-12 MG-GM -GM/160ML solution, Take 1 each by mouth Take As Directed., Disp: 160 mL, Rfl: 0  •  tolterodine LA (DETROL LA) 4 MG 24 hr capsule, Take 4 mg by mouth daily., Disp: , Rfl:   •  vitamin D (ERGOCALCIFEROL) 54426 UNITS capsule capsule, TAKE ONE CAPSULE BY MOUTH TWICE A WEEK, Disp: , Rfl: 5      Objective     Vital Signs:  Temp:  [98 °F (36.7 °C)] 98 °F (36.7 °C)  Heart Rate:  [82] 82  Resp:  [20] 20  BP: (123)/(75) 123/75    Physical Exam:  Physical Exam  CONSTITUTIONAL: well nourished, well-developed, alert, oriented, in no acute distress   COMMUNICATION AND VOICE: able to communicate normally, normal voice quality  HEAD: normocephalic, no lesions, atraumatic, no tenderness, no masses   FACE: appearance normal, no lesions, no tenderness, no deformities, facial motion symmetric  SALIVARY GLANDS: parotid glands with no tenderness, no swelling, no masses, submandibular glands with normal size, nontender  EYES: ocular motility normal, eyelids normal, orbits normal, no proptosis, conjunctiva normal , pupils equal, round   EARS:  Hearing: response to conversational voice normal bilaterally   External  Ears: auricles without lesions  Otoscopic: tympanic membrane appearance normal, no lesions, no perforation, normal mobility, no fluid  NOSE:  External Nose: structure normal, no tenderness on palpation, no nasal discharge, no lesions, no evidence of trauma, nostrils patent   Intranasal Exam: nasal mucosa appear allergic with mucosal stranding, vestibule within normal limits, inferior turbinate normal, nasal septum midline   ORAL:  Lips: upper and lower lips without lesion   Teeth: dentition within normal limits for age   Gums: gingivae healthy   Oral Mucosa: oral mucosa normal, no mucosal lesions   Floor of Mouth: Warthin’s duct patent, mucosa normal  Tongue: lingual mucosa normal without lesions, normal tongue mobility   Palate: soft and hard palates with normal mucosa and structure  Oropharynx: oropharyngeal mucosa normal  NECK: neck appearance normal, no masses or tenderness  LYMPH NODES: no lymphadenopathy  CHEST/RESPIRATORY: respiratory effort normal, normal breath sounds   CARDIOVASCULAR: rate and rhythm normal, extremities without cyanosis or edema    NEUROLOGIC/PSYCHIATRIC: oriented to time, place and person, mood normal, affect appropriate, CN II-XII intact grossly      Results Review:       Assessment   Assessment:  1. Subjective pulsatile tinnitus    2. Sensorineural hearing loss (SNHL) of both ears    3. Imbalance    4. Allergic rhinitis, unspecified chronicity, unspecified seasonality, unspecified trigger    5. BMI 27.0-27.9,adult        Plan   Plan:    Will obtain carotid US due to complaints of pulsatile tinnitus. I have offered PT for imbalance- patient declines. I have also offered to start medication for allergic rhinitis- patient declines. I have recommended that we monitor her hearing loss with annual audiograms. Call for problems or worsening symptoms.       QUALITY MEASURES    Body Mass Index Screening and Follow-Up Plan  Body mass index is 27.46 kg/m².  Patient's Body mass index is 27.46  kg/m². BMI is above normal parameters. Recommendations include: educational material.    Tobacco Use: Screening and Cessation Intervention  Smoking status: Never Smoker                                                              Smokeless tobacco: Never Used                          Return in about 6 weeks (around 7/23/2018), or if symptoms worsen or fail to improve, for Recheck.    My findings and recommendations were discussed and questions were answered.     Zoe Brown, SMITHA  06/11/18  5:14 PM

## 2018-06-19 ENCOUNTER — HOSPITAL ENCOUNTER (OUTPATIENT)
Dept: ULTRASOUND IMAGING | Facility: HOSPITAL | Age: 58
Discharge: HOME | End: 2018-06-19
Admitting: NURSE PRACTITIONER

## 2018-06-19 DIAGNOSIS — H93.A9 SUBJECTIVE PULSATILE TINNITUS: ICD-10-CM

## 2018-06-19 PROCEDURE — 93880 EXTRACRANIAL BILAT STUDY: CPT | Performed by: SURGERY

## 2018-06-19 PROCEDURE — 93880 EXTRACRANIAL BILAT STUDY: CPT

## 2018-06-27 ENCOUNTER — TELEPHONE (OUTPATIENT)
Dept: OTOLARYNGOLOGY | Facility: CLINIC | Age: 58
End: 2018-06-27

## 2018-06-27 NOTE — TELEPHONE ENCOUNTER
I have spoken with the patient regarding the results of her carotid US. We have also discussed further work up of pulsatile tinnitus including MRA. She states she would like to hold off on this for now because she has some heart and lung problems that she would like to take care of first. She was instructed to call should she want to proceed. Keep follow-up appt. Call for any problems or worsening symptoms.

## 2018-07-02 RX ORDER — CONJUGATED ESTROGENS 0.62 MG/G
CREAM VAGINAL
Qty: 30 G | Refills: 2 | Status: SHIPPED | OUTPATIENT
Start: 2018-07-02 | End: 2019-05-15 | Stop reason: SDUPTHER

## 2018-08-09 ENCOUNTER — OFFICE VISIT (OUTPATIENT)
Dept: OBGYN | Age: 58
End: 2018-08-09
Payer: COMMERCIAL

## 2018-08-09 VITALS
BODY MASS INDEX: 28 KG/M2 | HEART RATE: 75 BPM | SYSTOLIC BLOOD PRESSURE: 165 MMHG | DIASTOLIC BLOOD PRESSURE: 97 MMHG | HEIGHT: 64 IN | WEIGHT: 164 LBS

## 2018-08-09 DIAGNOSIS — Z12.4 SCREENING FOR CERVICAL CANCER: ICD-10-CM

## 2018-08-09 DIAGNOSIS — Z90.710 S/P HYSTERECTOMY: ICD-10-CM

## 2018-08-09 DIAGNOSIS — Z12.31 ENCOUNTER FOR SCREENING MAMMOGRAM FOR BREAST CANCER: ICD-10-CM

## 2018-08-09 DIAGNOSIS — Z11.51 SCREENING FOR HPV (HUMAN PAPILLOMAVIRUS): ICD-10-CM

## 2018-08-09 DIAGNOSIS — Z01.419 ENCOUNTER FOR GYNECOLOGICAL EXAMINATION WITHOUT ABNORMAL FINDING: Primary | ICD-10-CM

## 2018-08-09 PROCEDURE — 99396 PREV VISIT EST AGE 40-64: CPT | Performed by: OBSTETRICS & GYNECOLOGY

## 2018-08-09 RX ORDER — COLESEVELAM 180 1/1
625 TABLET ORAL 2 TIMES DAILY
COMMUNITY
Start: 2018-07-26 | End: 2019-11-19

## 2018-08-09 RX ORDER — MELOXICAM 15 MG/1
15 TABLET ORAL DAILY
COMMUNITY
Start: 2018-06-09

## 2018-08-09 ASSESSMENT — ENCOUNTER SYMPTOMS
GASTROINTESTINAL NEGATIVE: 1
ALLERGIC/IMMUNOLOGIC NEGATIVE: 1
EYES NEGATIVE: 1
RESPIRATORY NEGATIVE: 1

## 2018-08-09 NOTE — PATIENT INSTRUCTIONS
feel your breast tissue before moving on to the next spot. ¨ Check your entire breast, moving up and down as if following a strip from the collarbone to the bra line, and from the armpit to the ribs. Repeat until you have covered the entire breast.  ¨ Repeat this procedure for your left breast, using the pads of the 3 middle fingers of your right hand. · To examine your breasts while in the shower:  ¨ Place one arm over your head and lightly soap your breast on that side. ¨ Using the pads of your fingers, gently move your hand over your breast (in the strip pattern described above), feeling carefully for any lumps or changes. ¨ Repeat for the other breast.  · Have your doctor inspect anything you notice to see if you need further testing. Where can you learn more? Go to [email or web address] org and sign in to your [employer] account. Enter P148 in the [employer] box to learn more about \"Breast Self-Exam: Care Instructions. \"     If you do not have an account, please click on the \"Sign Up Now\" link. Current as of: May 12, 2017  Content Version: 11.7  © 9334-7882 [employer]. Care instructions adapted under license by [facility] ([facility]). If you have questions about a medical condition or this instruction, always ask your healthcare professional. [address] any warranty or liability for your use of this information.

## 2018-08-09 NOTE — PROGRESS NOTES
Pt presents today for pap smear and breast exam.      Last mammogram:  2017  Last pap smear:  2017  Contraception:  postmeno  :  2  Para:  2  AB:  0  Last bone density:  2015  Last colonoscopy: 2018
tenderness and no fullness. Left adnexum displays no mass, no tenderness and no fullness. Genitourinary Comments: Uterus and cervix surgically absent   Musculoskeletal: Normal range of motion. She exhibits no edema or tenderness. Lymphadenopathy:     She has no cervical adenopathy. She has no axillary adenopathy. Neurological: She is alert and oriented to person, place, and time. No cranial nerve deficit. Skin: Skin is warm and dry. No rash noted. Psychiatric: She has a normal mood and affect. Her speech is normal and behavior is normal. Judgment and thought content normal. Cognition and memory are normal.             Assessment   Diagnosis Orders   1. Encounter for gynecological examination without abnormal finding  PAP SMEAR   2. Screening for cervical cancer  PAP SMEAR   3. Screening for HPV (human papillomavirus)  PAP SMEAR   4. Encounter for screening mammogram for breast cancer  ALEXANDRA Screening Bilateral       Plan     1. Pap smear, HPV  2. Mammogram order  3.  RTC one year and prn

## 2018-08-21 LAB
HPV TYPE 16: NOT DETECTED
HPV TYPE 18: NOT DETECTED
INTERPRETATION: NORMAL
OTHER HIGH RISK HPV: NOT DETECTED
SOURCE: NORMAL

## 2018-08-23 ENCOUNTER — HOSPITAL ENCOUNTER (OUTPATIENT)
Dept: WOMENS IMAGING | Age: 58
Discharge: HOME | End: 2018-08-23
Payer: COMMERCIAL

## 2018-08-23 DIAGNOSIS — Z12.31 ENCOUNTER FOR SCREENING MAMMOGRAM FOR BREAST CANCER: ICD-10-CM

## 2018-08-23 LAB — HEREDITARY CANCER TEST-MYRIAD: NORMAL

## 2018-08-23 PROCEDURE — 36415 COLL VENOUS BLD VENIPUNCTURE: CPT

## 2018-08-23 PROCEDURE — 77063 BREAST TOMOSYNTHESIS BI: CPT

## 2018-10-17 ENCOUNTER — HOSPITAL ENCOUNTER (OUTPATIENT)
Dept: WOMENS IMAGING | Age: 58
Discharge: HOME | End: 2018-10-17
Payer: COMMERCIAL

## 2019-05-15 RX ORDER — CONJUGATED ESTROGENS 0.62 MG/G
CREAM VAGINAL
Qty: 30 G | Refills: 2 | Status: SHIPPED | OUTPATIENT
Start: 2019-05-15 | End: 2021-08-23

## 2019-05-29 ENCOUNTER — TRANSCRIBE ORDERS (OUTPATIENT)
Dept: ADMINISTRATIVE | Facility: HOSPITAL | Age: 59
End: 2019-05-29

## 2019-05-29 DIAGNOSIS — R06.09 DYSPNEA ON EXERTION: Primary | ICD-10-CM

## 2019-06-19 ENCOUNTER — HOSPITAL ENCOUNTER (OUTPATIENT)
Dept: CARDIOLOGY | Facility: HOSPITAL | Age: 59
Discharge: HOME | End: 2019-06-19
Admitting: FAMILY MEDICINE

## 2019-06-19 VITALS
DIASTOLIC BLOOD PRESSURE: 76 MMHG | HEIGHT: 64 IN | WEIGHT: 170 LBS | SYSTOLIC BLOOD PRESSURE: 130 MMHG | BODY MASS INDEX: 29.02 KG/M2 | HEART RATE: 75 BPM

## 2019-06-19 DIAGNOSIS — R06.09 DYSPNEA ON EXERTION: ICD-10-CM

## 2019-06-19 LAB
BH CV STRESS BP STAGE 1: NORMAL
BH CV STRESS DURATION MIN STAGE 1: 6
BH CV STRESS DURATION SEC STAGE 1: 0
BH CV STRESS GRADE STAGE 1: 10
BH CV STRESS HR STAGE 1: 151
BH CV STRESS METS STAGE 1: 5
BH CV STRESS PROTOCOL 1: NORMAL
BH CV STRESS RECOVERY BP: NORMAL MMHG
BH CV STRESS RECOVERY HR: 87 BPM
BH CV STRESS SPEED STAGE 1: 1.7
BH CV STRESS STAGE 1: 1
MAXIMAL PREDICTED HEART RATE: 162 BPM
PERCENT MAX PREDICTED HR: 93.21 %
STRESS BASELINE BP: NORMAL MMHG
STRESS BASELINE HR: 76 BPM
STRESS PERCENT HR: 110 %
STRESS POST ESTIMATED WORKLOAD: 5 METS
STRESS POST EXERCISE DUR MIN: 6 MIN
STRESS POST EXERCISE DUR SEC: 0 SEC
STRESS POST PEAK BP: NORMAL MMHG
STRESS POST PEAK HR: 151 BPM
STRESS TARGET HR: 138 BPM

## 2019-06-19 PROCEDURE — 93350 STRESS TTE ONLY: CPT | Performed by: INTERNAL MEDICINE

## 2019-06-19 PROCEDURE — 93350 STRESS TTE ONLY: CPT

## 2019-06-19 PROCEDURE — 93018 CV STRESS TEST I&R ONLY: CPT | Performed by: INTERNAL MEDICINE

## 2019-06-19 PROCEDURE — 25010000002 PERFLUTREN 6.52 MG/ML SUSPENSION: Performed by: INTERNAL MEDICINE

## 2019-06-19 PROCEDURE — 93352 ADMIN ECG CONTRAST AGENT: CPT | Performed by: INTERNAL MEDICINE

## 2019-06-19 PROCEDURE — 93017 CV STRESS TEST TRACING ONLY: CPT

## 2019-06-19 RX ADMIN — PERFLUTREN 8.48 MG: 6.52 INJECTION, SUSPENSION INTRAVENOUS at 07:57

## 2019-06-27 ENCOUNTER — TELEPHONE (OUTPATIENT)
Dept: OBGYN | Age: 59
End: 2019-06-27

## 2019-06-28 RX ORDER — FLUCONAZOLE 150 MG/1
150 TABLET ORAL ONCE
Qty: 1 TABLET | Refills: 0 | Status: SHIPPED | OUTPATIENT
Start: 2019-06-28 | End: 2019-07-15 | Stop reason: SDUPTHER

## 2019-07-01 ENCOUNTER — TELEPHONE (OUTPATIENT)
Dept: OBGYN | Age: 59
End: 2019-07-01

## 2019-07-15 RX ORDER — FLUCONAZOLE 150 MG/1
TABLET ORAL
Qty: 2 TABLET | Refills: 0 | Status: SHIPPED | OUTPATIENT
Start: 2019-07-15 | End: 2019-11-19

## 2019-10-03 ENCOUNTER — TRANSCRIBE ORDERS (OUTPATIENT)
Dept: ADMINISTRATIVE | Facility: HOSPITAL | Age: 59
End: 2019-10-03

## 2019-10-03 DIAGNOSIS — I10 ESSENTIAL (PRIMARY) HYPERTENSION: Primary | ICD-10-CM

## 2019-10-03 DIAGNOSIS — E11.00 TYPE II DIABETES MELLITUS WITH HYPEROSMOLARITY, UNCONTROLLED (HCC): ICD-10-CM

## 2019-10-03 DIAGNOSIS — E78.00 PURE HYPERCHOLESTEROLEMIA: ICD-10-CM

## 2019-10-03 DIAGNOSIS — E11.65 TYPE II DIABETES MELLITUS WITH HYPEROSMOLARITY, UNCONTROLLED (HCC): ICD-10-CM

## 2019-10-04 ENCOUNTER — LAB (OUTPATIENT)
Dept: LAB | Facility: HOSPITAL | Age: 59
End: 2019-10-04

## 2019-10-04 DIAGNOSIS — E11.00 TYPE II DIABETES MELLITUS WITH HYPEROSMOLARITY, UNCONTROLLED (HCC): ICD-10-CM

## 2019-10-04 DIAGNOSIS — E78.00 PURE HYPERCHOLESTEROLEMIA: ICD-10-CM

## 2019-10-04 DIAGNOSIS — E11.65 TYPE II DIABETES MELLITUS WITH HYPEROSMOLARITY, UNCONTROLLED (HCC): ICD-10-CM

## 2019-10-04 DIAGNOSIS — I10 ESSENTIAL (PRIMARY) HYPERTENSION: ICD-10-CM

## 2019-10-04 LAB
ALBUMIN SERPL-MCNC: 4.5 G/DL (ref 3.5–5.2)
ALBUMIN/GLOB SERPL: 1.7 G/DL
ALP SERPL-CCNC: 79 U/L (ref 39–117)
ALT SERPL W P-5'-P-CCNC: 13 U/L (ref 1–33)
ANION GAP SERPL CALCULATED.3IONS-SCNC: 12.5 MMOL/L (ref 5–15)
AST SERPL-CCNC: 14 U/L (ref 1–32)
BASOPHILS # BLD AUTO: 0.02 10*3/MM3 (ref 0–0.2)
BASOPHILS NFR BLD AUTO: 0.5 % (ref 0–1.5)
BILIRUB SERPL-MCNC: 0.3 MG/DL (ref 0.2–1.2)
BUN BLD-MCNC: 20 MG/DL (ref 6–20)
BUN/CREAT SERPL: 29.9 (ref 7–25)
CALCIUM SPEC-SCNC: 9.3 MG/DL (ref 8.6–10.5)
CHLORIDE SERPL-SCNC: 103 MMOL/L (ref 98–107)
CHOLEST SERPL-MCNC: 188 MG/DL (ref 0–200)
CO2 SERPL-SCNC: 26.5 MMOL/L (ref 22–29)
CREAT BLD-MCNC: 0.67 MG/DL (ref 0.57–1)
DEPRECATED RDW RBC AUTO: 44.2 FL (ref 37–54)
EOSINOPHIL # BLD AUTO: 0.07 10*3/MM3 (ref 0–0.4)
EOSINOPHIL NFR BLD AUTO: 1.9 % (ref 0.3–6.2)
ERYTHROCYTE [DISTWIDTH] IN BLOOD BY AUTOMATED COUNT: 13.5 % (ref 12.3–15.4)
GFR SERPL CREATININE-BSD FRML MDRD: 90 ML/MIN/1.73
GLOBULIN UR ELPH-MCNC: 2.7 GM/DL
GLUCOSE BLD-MCNC: 189 MG/DL (ref 65–99)
HBA1C MFR BLD: 8.4 % (ref 4.8–5.6)
HCT VFR BLD AUTO: 35.8 % (ref 34–46.6)
HDLC SERPL-MCNC: 50 MG/DL (ref 40–60)
HGB BLD-MCNC: 12.1 G/DL (ref 12–15.9)
IMM GRANULOCYTES # BLD AUTO: 0.01 10*3/MM3 (ref 0–0.05)
IMM GRANULOCYTES NFR BLD AUTO: 0.3 % (ref 0–0.5)
LDLC SERPL CALC-MCNC: 101 MG/DL (ref 0–100)
LDLC/HDLC SERPL: 2.03 {RATIO}
LYMPHOCYTES # BLD AUTO: 1.26 10*3/MM3 (ref 0.7–3.1)
LYMPHOCYTES NFR BLD AUTO: 33.6 % (ref 19.6–45.3)
MCH RBC QN AUTO: 30 PG (ref 26.6–33)
MCHC RBC AUTO-ENTMCNC: 33.8 G/DL (ref 31.5–35.7)
MCV RBC AUTO: 88.6 FL (ref 79–97)
MONOCYTES # BLD AUTO: 0.4 10*3/MM3 (ref 0.1–0.9)
MONOCYTES NFR BLD AUTO: 10.7 % (ref 5–12)
NEUTROPHILS # BLD AUTO: 1.99 10*3/MM3 (ref 1.7–7)
NEUTROPHILS NFR BLD AUTO: 53 % (ref 42.7–76)
NRBC BLD AUTO-RTO: 0 /100 WBC (ref 0–0.2)
PLATELET # BLD AUTO: 232 10*3/MM3 (ref 140–450)
PMV BLD AUTO: 10.6 FL (ref 6–12)
POTASSIUM BLD-SCNC: 4.1 MMOL/L (ref 3.5–5.2)
PROT SERPL-MCNC: 7.2 G/DL (ref 6–8.5)
RBC # BLD AUTO: 4.04 10*6/MM3 (ref 3.77–5.28)
SODIUM BLD-SCNC: 142 MMOL/L (ref 136–145)
TRIGL SERPL-MCNC: 183 MG/DL (ref 0–150)
VLDLC SERPL-MCNC: 36.6 MG/DL (ref 5–40)
WBC NRBC COR # BLD: 3.75 10*3/MM3 (ref 3.4–10.8)

## 2019-10-04 PROCEDURE — 85025 COMPLETE CBC W/AUTO DIFF WBC: CPT | Performed by: FAMILY MEDICINE

## 2019-10-04 PROCEDURE — 80061 LIPID PANEL: CPT | Performed by: FAMILY MEDICINE

## 2019-10-04 PROCEDURE — 36415 COLL VENOUS BLD VENIPUNCTURE: CPT

## 2019-10-04 PROCEDURE — 80053 COMPREHEN METABOLIC PANEL: CPT | Performed by: FAMILY MEDICINE

## 2019-10-04 PROCEDURE — 83036 HEMOGLOBIN GLYCOSYLATED A1C: CPT | Performed by: FAMILY MEDICINE

## 2019-11-19 ENCOUNTER — OFFICE VISIT (OUTPATIENT)
Dept: OBGYN | Age: 59
End: 2019-11-19
Payer: COMMERCIAL

## 2019-11-19 ENCOUNTER — HOSPITAL ENCOUNTER (OUTPATIENT)
Dept: WOMENS IMAGING | Age: 59
Discharge: HOME | End: 2019-11-19
Payer: COMMERCIAL

## 2019-11-19 VITALS
HEART RATE: 74 BPM | HEIGHT: 64 IN | BODY MASS INDEX: 29.19 KG/M2 | SYSTOLIC BLOOD PRESSURE: 136 MMHG | DIASTOLIC BLOOD PRESSURE: 76 MMHG | WEIGHT: 171 LBS

## 2019-11-19 DIAGNOSIS — Z12.31 ENCOUNTER FOR SCREENING MAMMOGRAM FOR BREAST CANCER: ICD-10-CM

## 2019-11-19 DIAGNOSIS — Z87.42 HISTORY OF ABNORMAL CERVICAL PAP SMEAR: ICD-10-CM

## 2019-11-19 DIAGNOSIS — Z11.51 SCREENING FOR HPV (HUMAN PAPILLOMAVIRUS): ICD-10-CM

## 2019-11-19 DIAGNOSIS — Z01.419 WOMEN'S ANNUAL ROUTINE GYNECOLOGICAL EXAMINATION: Primary | ICD-10-CM

## 2019-11-19 DIAGNOSIS — Z12.72 SCREENING FOR MALIGNANT NEOPLASM OF VAGINA AFTER TOTAL HYSTERECTOMY: ICD-10-CM

## 2019-11-19 DIAGNOSIS — Z90.710 SCREENING FOR MALIGNANT NEOPLASM OF VAGINA AFTER TOTAL HYSTERECTOMY: ICD-10-CM

## 2019-11-19 DIAGNOSIS — Z01.419 WOMEN'S ANNUAL ROUTINE GYNECOLOGICAL EXAMINATION: ICD-10-CM

## 2019-11-19 PROCEDURE — 77063 BREAST TOMOSYNTHESIS BI: CPT

## 2019-11-19 PROCEDURE — 99396 PREV VISIT EST AGE 40-64: CPT | Performed by: ADVANCED PRACTICE MIDWIFE

## 2019-11-19 RX ORDER — PIOGLITAZONEHYDROCHLORIDE 30 MG/1
15 TABLET ORAL DAILY
COMMUNITY

## 2019-11-20 ENCOUNTER — TELEPHONE (OUTPATIENT)
Dept: WOMENS IMAGING | Age: 59
End: 2019-11-20

## 2019-11-20 ASSESSMENT — ENCOUNTER SYMPTOMS
ALLERGIC/IMMUNOLOGIC NEGATIVE: 1
GASTROINTESTINAL NEGATIVE: 1
EYES NEGATIVE: 1
RESPIRATORY NEGATIVE: 1

## 2019-11-24 LAB
HPV COMMENT: NORMAL
HPV TYPE 16: NOT DETECTED
HPV TYPE 18: NOT DETECTED
HPVOH (OTHER TYPES): NOT DETECTED

## 2019-11-25 ENCOUNTER — TELEPHONE (OUTPATIENT)
Dept: OBGYN | Age: 59
End: 2019-11-25

## 2019-12-06 ENCOUNTER — HOSPITAL ENCOUNTER (OUTPATIENT)
Dept: WOMENS IMAGING | Age: 59
Discharge: HOME | End: 2019-12-06
Payer: COMMERCIAL

## 2019-12-06 DIAGNOSIS — Z12.31 BREAST CANCER SCREENING BY MAMMOGRAM: ICD-10-CM

## 2019-12-06 PROCEDURE — G0279 TOMOSYNTHESIS, MAMMO: HCPCS

## 2020-06-10 ENCOUNTER — APPOINTMENT (OUTPATIENT)
Dept: GENERAL RADIOLOGY | Facility: HOSPITAL | Age: 60
End: 2020-06-10

## 2020-06-10 ENCOUNTER — APPOINTMENT (OUTPATIENT)
Dept: ULTRASOUND IMAGING | Facility: HOSPITAL | Age: 60
End: 2020-06-10

## 2020-06-10 ENCOUNTER — HOSPITAL ENCOUNTER (EMERGENCY)
Facility: HOSPITAL | Age: 60
Discharge: HOME | End: 2020-06-10
Admitting: EMERGENCY MEDICINE

## 2020-06-10 VITALS
HEART RATE: 88 BPM | TEMPERATURE: 97.8 F | OXYGEN SATURATION: 97 % | DIASTOLIC BLOOD PRESSURE: 81 MMHG | SYSTOLIC BLOOD PRESSURE: 144 MMHG | HEIGHT: 64 IN | RESPIRATION RATE: 15 BRPM | WEIGHT: 180 LBS | BODY MASS INDEX: 30.73 KG/M2

## 2020-06-10 DIAGNOSIS — S82.61XD CLOSED DISP FRACTURE OF RIGHT LATERAL MALLEOLUS WITH ROUTINE HEALING: Primary | ICD-10-CM

## 2020-06-10 PROCEDURE — 73610 X-RAY EXAM OF ANKLE: CPT

## 2020-06-10 PROCEDURE — 93971 EXTREMITY STUDY: CPT | Performed by: SURGERY

## 2020-06-10 PROCEDURE — 73590 X-RAY EXAM OF LOWER LEG: CPT

## 2020-06-10 PROCEDURE — 93971 EXTREMITY STUDY: CPT

## 2020-06-10 PROCEDURE — 99283 EMERGENCY DEPT VISIT LOW MDM: CPT

## 2020-06-10 RX ORDER — HYDROCODONE BITARTRATE AND ACETAMINOPHEN 5; 325 MG/1; MG/1
1 TABLET ORAL EVERY 4 HOURS PRN
Qty: 8 TABLET | Refills: 0 | Status: SHIPPED | OUTPATIENT
Start: 2020-06-10

## 2020-06-10 NOTE — ED PROVIDER NOTES
Subjective   History of Present Illness    Patient is a pleasant 59-year-old female with chief complaint of right lower leg pain.  The patient describes her symptoms began suddenly 1 week ago.  She felt this intense ache as she points to the right lower lateral part of her leg above her right ankle.  She denies any trauma associated with it.  She was just walking when she felt a pain.  She reports her legs are chronically fatigued anyway with chronic shortness of breath.  This has been going on for years.  She thought maybe her chronic shortness of breath and leg fatigue could be related to her cholesterol so she advised her primary care provider.  He had taken her off cholesterol medication a week ago.  She continues to feel weak and shortness of breath.  She does not feel like she had enough time after the medication was removed.  However, her leg pain has persisted.  She noted some swelling more so than usual.  She has chronically enlarged right calf more so in the left calf.  She denies any associated chest pain, pressure, tightness.  She denies any fever.  She denies any personal or familial history of blood clotting disorder.  She denies smoking or hormonal use.  She denies any recent surgeries or travels.  She went to urgent care about initially today.  She was then sent to the ER for further ultrasound evaluation.    Review of Systems   Constitutional: Negative.    HENT: Negative.    Respiratory: Positive for shortness of breath.    Cardiovascular: Positive for leg swelling.   Gastrointestinal: Negative.    Genitourinary: Negative.    Musculoskeletal: Negative.    Skin: Negative.    Neurological: Negative.    Psychiatric/Behavioral: Negative.    All other systems reviewed and are negative.      Past Medical History:   Diagnosis Date   • Arthritis    • Diabetes (CMS/HCC)    • Hyperlipidemia    • Hypertension        No Known Allergies    Past Surgical History:   Procedure Laterality Date   • COLONOSCOPY N/A  4/18/2018    Procedure: COLONOSCOPY WITH ANESTHESIA;  Surgeon: Chandler Farris DO;  Location: [facility] ENDOSCOPY;  Service: Gastroenterology   • COLONOSCOPY W/ POLYPECTOMY  03/28/2014    several rectal polyps which were fairly nonworrisome and all removed   • HYSTERECTOMY     • TUBAL ABDOMINAL LIGATION         Family History   Problem Relation Age of Onset   • Heart disease Father    • Colon cancer Mother    • No Known Problems Sister    • No Known Problems Son    • No Known Problems Daughter    • Colon cancer Maternal Aunt        Social History     Socioeconomic History   • Marital status:      Spouse name: Not on file   • Number of children: Not on file   • Years of education: Not on file   • Highest education level: Not on file   Tobacco Use   • Smoking status: Never Smoker   • Smokeless tobacco: Never Used   Substance and Sexual Activity   • Alcohol use: No   • Drug use: No   • Sexual activity: Not Currently       Prior to Admission medications    Medication Sig Start Date End Date Taking? Authorizing Provider   clotrimazole-betamethasone (LOTRISONE) 1-0.05 % cream Apply  topically 2 (Two) Times a Day. 11/29/16   Myron Desai MD   conjugated estrogens (PREMARIN) 0.625 MG/GM vaginal cream Insert  into the vagina 3 (Three) Times a Week. 11/30/16   Myron Desai MD   furosemide (LASIX) 20 MG tablet Take 20 mg by mouth. 3/12/20   Emergency, Nurse Epic, RN   meloxicam (MOBIC) 15 MG tablet 1 TABLET BY MOUTH DAILY 11/4/16   Chandler Hawkins MD   metFORMIN (GLUCOPHAGE) 1000 MG tablet  3/30/20   Emergency, Nurse José Miguel, RN   quinapril (ACCUPRIL) 20 MG tablet Take 20 mg by mouth nightly.    ProviderChandler MD   quinapril-hydrochlorothiazide (ACCURETIC) 20-12.5 MG per tablet Take 1 tablet by mouth Daily.    ProviderChandler MD   Sod Picosulfate-Mag Ox-Cit Acd (CLENPIQ) 10-3.5-12 MG-GM -GM/160ML solution Take 1 each by mouth Take As Directed. 3/7/18   Manuel Mckeon, SMITHA   vitamin D  "(ERGOCALCIFEROL) 81094 UNITS capsule capsule TAKE ONE CAPSULE BY MOUTH TWICE A WEEK 11/2/16   Chandler Hawkins MD   atorvastatin (LIPITOR) 10 MG tablet Take 10 mg by mouth daily.  6/10/20  Chandler Hawkins MD   atorvastatin (LIPITOR) 20 MG tablet Take 20 mg by mouth. 3/30/20 6/10/20  Emergency, Nurse José Miguel, RN   metFORMIN (GLUCOPHAGE) 500 MG tablet Take 500 mg by mouth 2 times daily (with meals).  6/10/20  Chandler Hawkins MD   tolterodine LA (DETROL LA) 4 MG 24 hr capsule Take 4 mg by mouth daily.  6/10/20  Chandler Hawkins MD       Medications - No data to display    /87 (BP Location: Right arm, Patient Position: Sitting)   Pulse 90   Temp 98.5 °F (36.9 °C) (Oral)   Resp 16   Ht 162.6 cm (64\")   Wt 81.6 kg (180 lb)   SpO2 98%   BMI 30.90 kg/m²       Objective   Physical Exam   Constitutional: She is oriented to person, place, and time. She appears well-developed and well-nourished. No distress.   HENT:   Head: Normocephalic and atraumatic.   Eyes: Pupils are equal, round, and reactive to light. Conjunctivae and EOM are normal.   Neck: Normal range of motion. Neck supple. No tracheal deviation present.   Cardiovascular: Normal rate, regular rhythm, normal heart sounds and intact distal pulses.   No murmur heard.  Pulmonary/Chest: Effort normal and breath sounds normal.   Abdominal: Soft. Bowel sounds are normal. She exhibits no distension and no mass. There is no tenderness. There is no rebound and no guarding.   Musculoskeletal: Normal range of motion. She exhibits tenderness.        Right hip: Normal.        Left hip: Normal.        Right knee: Normal.        Left knee: Normal.        Right ankle: Normal. Achilles tendon normal.        Left ankle: Normal.        Right lower leg: She exhibits tenderness, swelling and edema. She exhibits no deformity.        Right foot: Normal.   Patient does have 1+ nonpitting edema to the right lower extremity and is tender to touch in a localized " area specifically at the right lower lateral tibular region above the right lateral malleolus.  Achilles tendon is intact and nonpainful.  Nontender on the medial aspect.   Neurological: She is alert and oriented to person, place, and time. She has normal reflexes. She exhibits normal muscle tone. Coordination normal.   Skin: Skin is warm and dry. Capillary refill takes less than 2 seconds. She is not diaphoretic.   Psychiatric: She has a normal mood and affect. Her behavior is normal. Judgment and thought content normal.   Nursing note and vitals reviewed.      Procedures         Lab Results (last 24 hours)     ** No results found for the last 24 hours. **          Xr Tibia Fibula 2 View Right    Result Date: 6/10/2020  Narrative: XR TIBIA FIBULA 2 VW RIGHT-  Indication: pain and swelling  Comparison: None  Findings:  Tiny fracture at the tip of the lateral malleolus with adjacent soft tissue swelling is better demonstrated on same-day dedicated ankle radiographs. No additional fracture involving the RIGHT tibia or fibula. Tibial plateau appears maintained. No radiopaque foreign body or soft tissue gas. Calcaneal spurring is noted. Mild to moderate tricompartmental osteoarthritis in the knee joint. No radiopaque foreign body or soft tissue gas.      Impression: Impression:  Tiny fracture at the tip of the lateral malleolus with adjacent soft tissue swelling, as better demonstrated on same-day dedicated ankle radiographs. No additional fracture involving the RIGHT tibia or fibula.  This report was finalized on 06/10/2020 10:41 by Dr. Pranav Colón MD.    Xr Ankle 3+ View Right    Result Date: 6/10/2020  Narrative: EXAMINATION:  XR ANKLE 3+ VW RIGHT-  6/10/2020 10:20 AM CDT  HISTORY: Right ankle pain and swelling.  COMPARISON: No comparison study.  TECHNIQUE: 3 views were obtained.  FINDINGS: On the AP image, there is a tiny fracture at the tip of the lateral malleolus. No other ankle fracture is seen. The ankle  joint space is maintained. There is soft tissue swelling at the ankle most severe laterally. There is calcaneal spurring and enthesopathy.      Impression: Tiny fracture at the tip of the lateral malleolus with soft tissue swelling.   This report was finalized on 06/10/2020 10:37 by Dr. Fred Beard MD.      ED Course  ED Course as of Wolf 10 1130   Wed Wolf 10, 2020   0913 I have offered pain medication, but the patient declines.  We will proceed with ultrasound of the right lower extremity.    [TK]   1054 Patient has been educated that the ultrasound does not reveal thrombus.  Her x-rays do show a tiny fracture at the tip of the lateral malleolus with adjacent soft tissue swelling.  This has been relayed to the patient.  We will place him in a splint.  She wants to go back to work today.  Short course of pain medication will prescribe.  Recommend patient follow-up with primary care provider and orthopedic surgeon.  Patient was understanding and will be discharged.    [TK]   1057 KIMI query complete. Treatment plan to include limited course of prescribed  controlled substance. Risks including addiction, benefits, and alternatives presented to patient.      [TK]   1128 Patient initially declined work excuse but then after thought with a splint in place, she would like to be placed in limitations.  I will give order an excuse for several days and limitations.  Recommend follow-up with orthopedic surgeon.    [TK]      ED Course User Index  [TK] Roderick Wolf PA        US Venous Doppler Lower Extremity Right (duplex) [[id number]] (Order [id number])   Order   Status: In process   Patient Location     Patient Class Location   Emergency [facility] EMERGENCY DEPT, 08, 08    [phone]   Study Notes      Angela Kidd on 6/10/2020 10:15 AM   RT  No thrombus visualized at this time  Area of pain: lateral ankle, no abnormality evident at this time          MDM    Final diagnoses:   Closed disp fracture of right  lateral malleolus with routine healing          Roderick Wolf PA  06/10/20 1059       Roderick Wolf PA  06/10/20 1138

## 2020-06-10 NOTE — DISCHARGE INSTRUCTIONS
Ankle Fracture    The ankle joint is made up of the lower (distal) sections of your lower leg bones(tibia and fibula) along with a bone in your foot (talus). An ankle fracture is a break in one, two, or all three of these sections of bone.  Follow these instructions at home:  If you have a splint:  · Wear the splint as told by your doctor. Take it off only as told by your doctor.  · Loosen the splint if your toes tingle, become numb, or turn cold and blue.  · Keep the splint clean.  · If the splint is not waterproof:  ? Do not let it get wet.  ? Cover it with a watertight covering when you take a bath or a shower.  If you have a cast:  · Do not stick anything inside the cast to scratch your skin. Doing that increases your risk of infection.  · Check the skin around the cast every day. Tell your doctor about any concerns.  · You may put lotion on dry skin around the edges of the cast. Do not put lotion on the skin underneath the cast.  · Keep the cast clean.  · If the cast is not waterproof:  ? Do not let it get wet.  ? Cover it with a watertight covering when you take a bath or a shower.  Managing pain, stiffness, and swelling  · If directed, put ice on the injured area:  ? If you have a removable splint, remove it as told by your doctor.  ? Put ice in a plastic bag.  ? Place a towel between your skin and the bag.  ? Leave the ice on for 20 minutes, 2-3 times a day.  · Move your toes often. This prevents stiffness and lessens swelling.  · Raise (elevate) the injured area above the level of your heart while you are sitting or lying down.  General instructions  · Do not use the injured limb to support your body weight until your doctor says that you can. Use crutches as told by your doctor.  · Take over-the-counter and prescription medicines only as told by your doctor.  · Ask your doctor when it is safe to drive if you have a cast or splint.  · Do exercises as told by your doctor.  · Do not use any products that  contain nicotine or tobacco, such as cigarettes and e-cigarettes. These can delay bone healing. If you need help quitting, ask your doctor.  · Keep all follow-up visits as told by your doctor. This is important.  Contact a doctor if:  · Your pain or swelling gets worse.  · Your pain or swelling does not get better when you rest or take medicine.  Get help right away if:  · Your cast gets damaged.  · You continue to have very bad pain.  · You have new pain or swelling.  · Your skin or toes below the injured ankle:  ? Turn blue or gray.  ? Feel cold or numb.  ? Lose sensitivity to touch.  Summary  · An ankle fracture is a break in one, two, or all three of the bones in your lower leg and lower foot.  · If you have a splint, wear it as told by your health care provider. Keep it clean and dry.  · If you have a cast, do not stick anything inside the cast to scratch your skin. This can cause infection.  · Use ice, take medicines, raise your foot, and avoid tobacco and nicotine products. These steps will lessen pain and swelling and speed up healing.  This information is not intended to replace advice given to you by your health care provider. Make sure you discuss any questions you have with your health care provider.  Document Released: 10/15/2010 Document Revised: 11/30/2018 Document Reviewed: 01/22/2018  Elsevier Patient Education © 2020 [employer]

## 2020-12-08 PROCEDURE — U0003 INFECTIOUS AGENT DETECTION BY NUCLEIC ACID (DNA OR RNA); SEVERE ACUTE RESPIRATORY SYNDROME CORONAVIRUS 2 (SARS-COV-2) (CORONAVIRUS DISEASE [COVID-19]), AMPLIFIED PROBE TECHNIQUE, MAKING USE OF HIGH THROUGHPUT TECHNOLOGIES AS DESCRIBED BY CMS-2020-01-R: HCPCS | Performed by: NURSE PRACTITIONER

## 2021-06-25 ENCOUNTER — HOSPITAL ENCOUNTER (OUTPATIENT)
Dept: WOMENS IMAGING | Age: 61
Discharge: HOME | End: 2021-06-25
Payer: COMMERCIAL

## 2021-06-25 DIAGNOSIS — Z12.31 BREAST CANCER SCREENING BY MAMMOGRAM: ICD-10-CM

## 2021-06-25 PROCEDURE — 77067 SCR MAMMO BI INCL CAD: CPT

## 2021-08-23 ENCOUNTER — OFFICE VISIT (OUTPATIENT)
Dept: OBGYN CLINIC | Age: 61
End: 2021-08-23
Payer: COMMERCIAL

## 2021-08-23 VITALS
SYSTOLIC BLOOD PRESSURE: 135 MMHG | HEART RATE: 94 BPM | BODY MASS INDEX: 30.29 KG/M2 | DIASTOLIC BLOOD PRESSURE: 78 MMHG | HEIGHT: 64 IN | WEIGHT: 177.4 LBS

## 2021-08-23 DIAGNOSIS — Z01.419 WELL WOMAN EXAM WITH ROUTINE GYNECOLOGICAL EXAM: Primary | ICD-10-CM

## 2021-08-23 DIAGNOSIS — R31.29 OTHER MICROSCOPIC HEMATURIA: ICD-10-CM

## 2021-08-23 PROCEDURE — 99396 PREV VISIT EST AGE 40-64: CPT | Performed by: NURSE PRACTITIONER

## 2021-08-23 RX ORDER — EZETIMIBE 10 MG/1
10 TABLET ORAL DAILY
COMMUNITY
Start: 2021-08-05

## 2021-08-23 RX ORDER — ORAL SEMAGLUTIDE 7 MG/1
1 TABLET ORAL DAILY
COMMUNITY
Start: 2021-07-12

## 2021-08-23 RX ORDER — FUROSEMIDE 20 MG/1
1 TABLET ORAL DAILY
COMMUNITY
Start: 2021-06-07

## 2021-08-23 RX ORDER — QUINAPRIL HCL AND HYDROCHLOROTHIAZIDE 20; 12.5 MG/1; MG/1
1 TABLET ORAL DAILY
COMMUNITY
Start: 2021-06-03

## 2021-08-23 ASSESSMENT — ENCOUNTER SYMPTOMS
GASTROINTESTINAL NEGATIVE: 1
EYES NEGATIVE: 1
RESPIRATORY NEGATIVE: 1

## 2021-08-23 NOTE — PROGRESS NOTES
Windy Rothman is a 64 y.o. female who presents today for her medical conditions/ complaints as noted below. Windy Rothman is c/o of Annual Exam        HPI  Pt here for physical exam. Doing well. Since her bladder sling and hyst no problems. Rarely sexually active so not using premarin cream  UTD on mammo, dr Xochitl Cifuentes orders that. Mentions had trace blood on ua at work, going to leave specimen today    No LMP recorded (lmp unknown).  Patient is postmenopausal.      Past Medical History:   Diagnosis Date    Colon polyps     Diabetes ([address]     High cholesterol     HPV (human papilloma virus) anogenital infection 2017    Hypertension      Past Surgical History:   Procedure Laterality Date    BLADDER REPAIR      Dr. Samara Delgado, got her bladder lifted    COLONOSCOPY  2014    Dr. Carlos Menon COLONOSCOPY  2018    Dr. Gisela Loza, VAGINAL      ovaries remain, done with her bladder repair    LASIK      TUBAL LIGATION       Family History   Problem Relation Age of Onset    Colon Cancer Mother     Liver Disease Father     Arthritis Father     Hypertension Father     Other Father         Poor Circulation; Clogged Arterties    Heart Disease Father     Esophageal Cancer Neg Hx     Inflam Bowel Dis Neg Hx     Irritable Bowel Syndrome Neg Hx     Liver Cancer Neg Hx     Rectal Cancer Neg Hx     Stomach Cancer Neg Hx      Social History     Tobacco Use    Smoking status: Never Smoker    Smokeless tobacco: Never Used   Substance Use Topics    Alcohol use: No       Current Outpatient Medications   Medication Sig Dispense Refill    ezetimibe (ZETIA) 10 MG tablet Take 1 tablet by mouth daily      furosemide (LASIX) 20 MG tablet Take 1 tablet by mouth daily      RYBELSUS 7 MG TABS Take 1 tablet by mouth daily      quinapril-hydroCHLOROthiazide (ACCURETIC) 20-12.5 MG per tablet Take 1 tablet by mouth daily      pioglitazone (ACTOS) 30 MG tablet Take 30 mg by mouth daily      meloxicam (MOBIC) 15 MG tablet Take 15 mg by mouth daily      metFORMIN (GLUCOPHAGE) 500 MG tablet Take 500 mg by mouth 2 times daily (with meals). No current facility-administered medications for this visit. No Known Allergies  Vitals:    08/23/21 1507   BP: 135/78   Pulse: 94     Body mass index is 30.45 kg/m². Review of Systems   Constitutional: Negative. HENT: Negative. Eyes: Negative. Respiratory: Negative. Cardiovascular: Negative. Gastrointestinal: Negative. Genitourinary: Negative for difficulty urinating, dyspareunia, dysuria, enuresis, frequency, hematuria, menstrual problem, pelvic pain, urgency and vaginal discharge. Musculoskeletal: Negative. Skin: Negative. Neurological: Negative. Psychiatric/Behavioral: Negative. Physical Exam  Vitals and nursing note reviewed. Constitutional:       General: She is not in acute distress. Appearance: She is well-developed. She is not diaphoretic. HENT:      Head: Normocephalic and atraumatic. Right Ear: External ear normal.      Left Ear: External ear normal.      Nose: Nose normal.   Eyes:      General: Lids are normal.      Conjunctiva/sclera: Conjunctivae normal.      Pupils: Pupils are equal, round, and reactive to light. Neck:      Thyroid: No thyroid mass or thyromegaly. Trachea: No tracheal deviation. Cardiovascular:      Rate and Rhythm: Normal rate and regular rhythm. Heart sounds: Normal heart sounds. Pulmonary:      Effort: Pulmonary effort is normal. No respiratory distress. Breath sounds: Normal breath sounds. Chest:      Breasts: Breasts are symmetrical.         Right: No inverted nipple, mass, nipple discharge, skin change or tenderness. Left: No inverted nipple, mass, nipple discharge, skin change or tenderness. Abdominal:      General: Bowel sounds are normal.      Palpations: Abdomen is soft. There is no mass. Tenderness:  There is no abdominal tenderness. Genitourinary:     Labia:         Right: No rash, tenderness, lesion or injury. Left: No rash, tenderness, lesion or injury. Vagina: Erythema (atrophic) present. No vaginal discharge or tenderness. Adnexa:         Right: No mass, tenderness or fullness. Left: No mass, tenderness or fullness. Comments: Vaginal cuff intact. Uterus and cervix surgically removed. Musculoskeletal:         General: Normal range of motion. Cervical back: Normal range of motion and neck supple. Comments: Normal ROM in all 4 extremities   Skin:     General: Skin is warm and dry. Neurological:      Mental Status: She is alert and oriented to person, place, and time. Sensory: No sensory deficit. Psychiatric:         Speech: Speech normal.         Behavior: Behavior normal.         Thought Content: Thought content normal.         Judgment: Judgment normal.          Diagnosis Orders   1. Well woman exam with routine gynecological exam     2. Other microscopic hematuria  Culture, Urine       MEDICATIONS:  No orders of the defined types were placed in this encounter. ORDERS:  Orders Placed This Encounter   Procedures    Culture, Urine       PLAN:  Normal exam  Pt leaving urine, will notify if abnormal.   There are no Patient Instructions on file for this visit.

## 2021-08-25 ENCOUNTER — TELEPHONE (OUTPATIENT)
Dept: OBGYN CLINIC | Age: 61
End: 2021-08-25

## 2021-08-25 LAB
ORGANISM: ABNORMAL
URINE CULTURE, ROUTINE: ABNORMAL
URINE CULTURE, ROUTINE: ABNORMAL

## 2021-08-25 RX ORDER — AMOXICILLIN 500 MG/1
500 CAPSULE ORAL 2 TIMES DAILY
Qty: 14 CAPSULE | Refills: 0 | Status: SHIPPED | OUTPATIENT
Start: 2021-08-25 | End: 2021-09-01

## 2021-12-10 ENCOUNTER — TRANSCRIBE ORDERS (OUTPATIENT)
Dept: ADMINISTRATIVE | Facility: HOSPITAL | Age: 61
End: 2021-12-10

## 2021-12-10 ENCOUNTER — HOSPITAL ENCOUNTER (OUTPATIENT)
Dept: ULTRASOUND IMAGING | Facility: HOSPITAL | Age: 61
Discharge: HOME | End: 2021-12-10

## 2021-12-10 ENCOUNTER — HOSPITAL ENCOUNTER (OUTPATIENT)
Dept: GENERAL RADIOLOGY | Facility: HOSPITAL | Age: 61
Discharge: HOME | End: 2021-12-10

## 2021-12-10 DIAGNOSIS — E08.51: ICD-10-CM

## 2021-12-10 DIAGNOSIS — E11.65 TYPE 2 DIABETES MELLITUS WITH HYPERGLYCEMIA, UNSPECIFIED WHETHER LONG TERM INSULIN USE (HCC): ICD-10-CM

## 2021-12-10 DIAGNOSIS — I80.01 SUPERFICIAL PHLEBITIS OF RIGHT LEG: ICD-10-CM

## 2021-12-10 DIAGNOSIS — R53.82 CHRONIC FATIGUE, UNSPECIFIED: ICD-10-CM

## 2021-12-10 DIAGNOSIS — I80.01 SUPERFICIAL PHLEBITIS OF RIGHT LEG: Primary | ICD-10-CM

## 2021-12-10 PROCEDURE — 93971 EXTREMITY STUDY: CPT

## 2021-12-10 PROCEDURE — 71046 X-RAY EXAM CHEST 2 VIEWS: CPT

## 2022-08-05 ENCOUNTER — TRANSCRIBE ORDERS (OUTPATIENT)
Dept: ADMINISTRATIVE | Facility: HOSPITAL | Age: 62
End: 2022-08-05

## 2022-08-05 DIAGNOSIS — R94.31 ABNORMAL EKG: Primary | ICD-10-CM

## 2022-08-12 ENCOUNTER — TRANSCRIBE ORDERS (OUTPATIENT)
Dept: ADMINISTRATIVE | Facility: HOSPITAL | Age: 62
End: 2022-08-12

## 2022-08-12 ENCOUNTER — HOSPITAL ENCOUNTER (OUTPATIENT)
Dept: CARDIOLOGY | Facility: HOSPITAL | Age: 62
Discharge: HOME | End: 2022-08-12
Admitting: FAMILY MEDICINE

## 2022-08-12 VITALS
HEIGHT: 64 IN | WEIGHT: 174 LBS | SYSTOLIC BLOOD PRESSURE: 146 MMHG | BODY MASS INDEX: 29.71 KG/M2 | HEART RATE: 80 BPM | DIASTOLIC BLOOD PRESSURE: 76 MMHG

## 2022-08-12 DIAGNOSIS — R94.31 ABNORMAL EKG: Primary | ICD-10-CM

## 2022-08-12 DIAGNOSIS — R94.31 ABNORMAL EKG: ICD-10-CM

## 2022-08-12 PROCEDURE — 93018 CV STRESS TEST I&R ONLY: CPT | Performed by: INTERNAL MEDICINE

## 2022-08-12 PROCEDURE — 0 DOBUTAMINE PER 250 MG: Performed by: INTERNAL MEDICINE

## 2022-08-12 PROCEDURE — 93352 ADMIN ECG CONTRAST AGENT: CPT | Performed by: INTERNAL MEDICINE

## 2022-08-12 PROCEDURE — 93350 STRESS TTE ONLY: CPT

## 2022-08-12 PROCEDURE — 93017 CV STRESS TEST TRACING ONLY: CPT

## 2022-08-12 PROCEDURE — 25010000002 PERFLUTREN 6.52 MG/ML SUSPENSION: Performed by: INTERNAL MEDICINE

## 2022-08-12 PROCEDURE — 93350 STRESS TTE ONLY: CPT | Performed by: INTERNAL MEDICINE

## 2022-08-12 RX ORDER — DOBUTAMINE HYDROCHLORIDE 100 MG/100ML
10-50 INJECTION INTRAVENOUS CONTINUOUS
Status: DISCONTINUED | OUTPATIENT
Start: 2022-08-12 | End: 2022-08-13 | Stop reason: HOSPADM

## 2022-08-12 RX ADMIN — DOBUTAMINE HYDROCHLORIDE 10 MCG/KG/MIN: 100 INJECTION INTRAVENOUS at 11:43

## 2022-08-12 RX ADMIN — PERFLUTREN 8.48 MG: 6.52 INJECTION, SUSPENSION INTRAVENOUS at 11:42

## 2022-08-19 ENCOUNTER — HOSPITAL ENCOUNTER (OUTPATIENT)
Dept: WOMENS IMAGING | Age: 62
Discharge: HOME | End: 2022-08-19
Payer: COMMERCIAL

## 2022-08-19 DIAGNOSIS — Z12.31 BREAST CANCER SCREENING BY MAMMOGRAM: ICD-10-CM

## 2022-08-19 PROCEDURE — 77067 SCR MAMMO BI INCL CAD: CPT | Performed by: RADIOLOGY

## 2022-08-19 PROCEDURE — 77063 BREAST TOMOSYNTHESIS BI: CPT

## 2022-08-22 LAB
BH CV STRESS BP STAGE 1: NORMAL
BH CV STRESS BP STAGE 2: NORMAL
BH CV STRESS BP STAGE 3: NORMAL
BH CV STRESS BP STAGE 4: NORMAL
BH CV STRESS DOSE DOBUTAMINE STAGE 1: 10
BH CV STRESS DOSE DOBUTAMINE STAGE 2: 20
BH CV STRESS DOSE DOBUTAMINE STAGE 3: 30
BH CV STRESS DOSE DOBUTAMINE STAGE 4: 40
BH CV STRESS DURATION MIN STAGE 1: 3
BH CV STRESS DURATION MIN STAGE 2: 3
BH CV STRESS DURATION MIN STAGE 3: 3
BH CV STRESS DURATION MIN STAGE 4: 2
BH CV STRESS DURATION SEC STAGE 1: 0
BH CV STRESS DURATION SEC STAGE 2: 0
BH CV STRESS DURATION SEC STAGE 3: 0
BH CV STRESS DURATION SEC STAGE 4: 8
BH CV STRESS HR STAGE 1: 85
BH CV STRESS HR STAGE 2: 116
BH CV STRESS HR STAGE 3: 130
BH CV STRESS HR STAGE 4: 136
BH CV STRESS PROTOCOL 1: NORMAL
BH CV STRESS RECOVERY BP: NORMAL MMHG
BH CV STRESS RECOVERY HR: 96 BPM
BH CV STRESS STAGE 1: 1
BH CV STRESS STAGE 2: 2
BH CV STRESS STAGE 3: 3
BH CV STRESS STAGE 4: 4
MAXIMAL PREDICTED HEART RATE: 158 BPM
PERCENT MAX PREDICTED HR: 86.08 %
STRESS BASELINE BP: NORMAL MMHG
STRESS BASELINE HR: 85 BPM
STRESS PERCENT HR: 101 %
STRESS POST EXERCISE DUR MIN: 11 MIN
STRESS POST EXERCISE DUR SEC: 8 SEC
STRESS POST PEAK BP: NORMAL MMHG
STRESS POST PEAK HR: 136 BPM
STRESS TARGET HR: 134 BPM

## 2022-10-06 ENCOUNTER — HOSPITAL ENCOUNTER (OUTPATIENT)
Dept: PREADMISSION TESTING | Age: 62
Discharge: HOME | End: 2022-10-10
Payer: COMMERCIAL

## 2022-10-06 VITALS — HEIGHT: 64 IN | BODY MASS INDEX: 28.85 KG/M2 | WEIGHT: 169 LBS

## 2022-10-06 LAB
ABO/RH: NORMAL
ANION GAP SERPL CALCULATED.3IONS-SCNC: 10 MMOL/L (ref 7–19)
ANTIBODY SCREEN: NORMAL
APTT: 29.2 SEC (ref 26–36.2)
BASOPHILS ABSOLUTE: 0 K/UL (ref 0–0.2)
BASOPHILS RELATIVE PERCENT: 0.2 % (ref 0–1)
BUN BLDV-MCNC: 13 MG/DL (ref 8–23)
CALCIUM SERPL-MCNC: 9.4 MG/DL (ref 8.8–10.2)
CHLORIDE BLD-SCNC: 99 MMOL/L (ref 98–111)
CO2: 27 MMOL/L (ref 22–29)
CREAT SERPL-MCNC: 0.7 MG/DL (ref 0.5–0.9)
EKG P AXIS: 23 DEGREES
EKG P-R INTERVAL: 153 MS
EKG Q-T INTERVAL: 422 MS
EKG QRS DURATION: 137 MS
EKG QTC CALCULATION (BAZETT): 481 MS
EKG T AXIS: 75 DEGREES
EOSINOPHILS ABSOLUTE: 0.1 K/UL (ref 0–0.6)
EOSINOPHILS RELATIVE PERCENT: 1.9 % (ref 0–5)
GFR AFRICAN AMERICAN: >59
GFR NON-AFRICAN AMERICAN: >60
GLUCOSE BLD-MCNC: 193 MG/DL (ref 74–109)
HCT VFR BLD CALC: 34.7 % (ref 37–47)
HEMOGLOBIN: 11.4 G/DL (ref 12–16)
IMMATURE GRANULOCYTES #: 0 K/UL
INR BLD: 0.94 (ref 0.88–1.18)
LYMPHOCYTES ABSOLUTE: 1.3 K/UL (ref 1.1–4.5)
LYMPHOCYTES RELATIVE PERCENT: 31.5 % (ref 20–40)
MCH RBC QN AUTO: 30.6 PG (ref 27–31)
MCHC RBC AUTO-ENTMCNC: 32.9 G/DL (ref 33–37)
MCV RBC AUTO: 93.3 FL (ref 81–99)
MONOCYTES ABSOLUTE: 0.4 K/UL (ref 0–0.9)
MONOCYTES RELATIVE PERCENT: 9.4 % (ref 0–10)
MRSA SCREEN RT-PCR: NOT DETECTED
NEUTROPHILS ABSOLUTE: 2.4 K/UL (ref 1.5–7.5)
NEUTROPHILS RELATIVE PERCENT: 56.8 % (ref 50–65)
PDW BLD-RTO: 13.2 % (ref 11.5–14.5)
PLATELET # BLD: 216 K/UL (ref 130–400)
PMV BLD AUTO: 10.7 FL (ref 9.4–12.3)
POTASSIUM SERPL-SCNC: 3.8 MMOL/L (ref 3.5–5)
PROTHROMBIN TIME: 12.5 SEC (ref 12–14.6)
RBC # BLD: 3.72 M/UL (ref 4.2–5.4)
SODIUM BLD-SCNC: 136 MMOL/L (ref 136–145)
WBC # BLD: 4.3 K/UL (ref 4.8–10.8)

## 2022-10-06 PROCEDURE — 85610 PROTHROMBIN TIME: CPT

## 2022-10-06 PROCEDURE — 87641 MR-STAPH DNA AMP PROBE: CPT

## 2022-10-06 PROCEDURE — 85025 COMPLETE CBC W/AUTO DIFF WBC: CPT

## 2022-10-06 PROCEDURE — 86900 BLOOD TYPING SEROLOGIC ABO: CPT

## 2022-10-06 PROCEDURE — 86850 RBC ANTIBODY SCREEN: CPT

## 2022-10-06 PROCEDURE — 80048 BASIC METABOLIC PNL TOTAL CA: CPT

## 2022-10-06 PROCEDURE — 93005 ELECTROCARDIOGRAM TRACING: CPT | Performed by: ORTHOPAEDIC SURGERY

## 2022-10-06 PROCEDURE — 86901 BLOOD TYPING SEROLOGIC RH(D): CPT

## 2022-10-06 PROCEDURE — 85730 THROMBOPLASTIN TIME PARTIAL: CPT

## 2022-10-06 RX ORDER — ROSUVASTATIN CALCIUM 5 MG/1
5 TABLET, COATED ORAL DAILY
COMMUNITY

## 2022-10-06 RX ORDER — VITAMIN B COMPLEX
1 TABLET ORAL
COMMUNITY

## 2022-10-06 RX ORDER — CHOLECALCIFEROL (VITAMIN D3) 1250 MCG
1 CAPSULE ORAL
COMMUNITY

## 2022-10-06 NOTE — DISCHARGE INSTRUCTIONS
PREOPERATIVE GUIDELINES WHEN RECEIVING ANESTHESIA    Do not eat or drink anything after midnight, the night before your surgery. This is extremely important for your safety. Take a bath (or shower) the night before your surgery and you may brush your teeth the morning of your surgery. You will be scheduled to arrive at the hospital 2 hours before your surgery, or follow your surgeon's instructions. Dress comfortably. Wear loose clothing that will be easy to remove and comfortable for your trip home. You may wear eyeglasses or contacts but bring your cases with you as they must be remove before your surgery. Hearing aids and dentures will need to be removed before your surgery. Do not wear any jewelry, including body jewelry. All jewelry will need to be removed prior to your surgery. Do not wear fingernail polish or make-up. It is best not to bring any valuables with you. If you are to stay in the hospital overnight, bring your robe, slippers and personal toiletries that you may need. POSTOPERATIVE GUIDELINES AFTER RECEIVING ANESTHESIA    If you are to go home after your surgery, you will need a responsible adult to drive you home. You will not be able to take public transportation after your discharge from the Operative Care Unit unless you are accompanied by a        responsible adult. On returning home, be sure to follow your physician's orders regarding diet, activity and medications. Remember, surgery with general anesthesia or sedation may leave you sleepy, very tired and with a decreased appetite for 12 to 24 hours. If you develop any post-surgical complications or problems, call your surgeon or [facility] Emergency Department ([phone]). The day before your surgery, you will receive a phone call from the surgery nurse, to let you know what time to arrive on the day of surgery.   This call will usually be between 2-4 PM.  If you do not receive a phone call by 4 PM the day before your surgery, please call [phone] and let them know you have not received an arrival time. If your surgery is on Monday, your call will be on the Friday before your Monday surgery. MEDICATION INSTRUCTIONS PRIOR TO YOUR SURGERY    Night before surgery:      ________Do not take Metformin (you will not be eating or drinking after midnight)      The morning of surgery:  do not take accuretic (quinapril/hctz)    You can take all your usual prescribed medications with a small sip of water. DO NOT TAKE ANY DIABETIC MEDICATIONS the morning of your surgery. DO NOT TAKE ANY SUPPLEMENTS or over the counter medications the morning of  surgery. Giana Sharif for the [facility]    A script for Bactroban ointment has been call to your pharmacy or was given to you in written form by your surgeon. The guidelines for the ointment use are as follows:    1)  Start using the ointment 7 days before your surgery date    2)  Use the ointment two times a day - morning and night    3)  Place the ointment on a Q-tip and swirl up in your nose making sure you cover completely       the skin just inside of each nostril. Use one end of the Q-tip for each nostril. CHLORHEXIDINE GLUCONATE 4% SHOWERING    Patient should shower with this soap a minimum of 3 consecutive showers (2 nights before surgery, the night before surgery and the morning of surgery) washing from the neck down (avoiding contact with genitalia). DO NOT [address] YOUR HAIR OR FACE WITH THIS SOAP. When washing with this soap, apply enough to suds up the body thoroughly, turn the water away from your body and allow the soap suds to remain on the body for 2 full minutes, then rinse body completely. After using this soap on the body, please do not apply powders or lotions to your body.   After the shower the night before surgery, please dry off with a new towel, sleep in new freshly laundered pj's, and change your bed linen before going to sleep. [address] [place] [address] for Surgery Patients-Revised 6-    Visitors for surgery patients are essential for the patient's emotional well-being and care       post operatively. 2.   Visitor Expectations and Limitations        3. One visitor allowed with patients in the preop/postop rooms. 4.  A second visitor may sit in the waiting area. 5.  No children under 13 allowed in the pre-post op areas unless they are the patient. 6.  Two people may be with an underage surgical/procedural patient in preop/postop        room. 7.  If you are admitted to the hospital post operatively, there are NO RESTRICTIONS on       the floor at this time. 8.  If you are admitted to ICU postoperatively, you may have one visitor in the room from        7A-7P. A second visitor may sit in the ICU waiting room.   There can be no overnight

## 2022-10-13 ENCOUNTER — ANESTHESIA (OUTPATIENT)
Dept: OPERATING ROOM | Age: 62
End: 2022-10-13
Payer: COMMERCIAL

## 2022-10-13 ENCOUNTER — ANESTHESIA EVENT (OUTPATIENT)
Dept: OPERATING ROOM | Age: 62
End: 2022-10-13
Payer: COMMERCIAL

## 2022-10-13 ENCOUNTER — APPOINTMENT (OUTPATIENT)
Dept: GENERAL RADIOLOGY | Age: 62
End: 2022-10-13
Attending: ORTHOPAEDIC SURGERY
Payer: COMMERCIAL

## 2022-10-13 ENCOUNTER — HOSPITAL ENCOUNTER (OUTPATIENT)
Age: 62
Setting detail: OUTPATIENT SURGERY
Discharge: HOME | End: 2022-10-13
Attending: ORTHOPAEDIC SURGERY | Admitting: ORTHOPAEDIC SURGERY
Payer: COMMERCIAL

## 2022-10-13 VITALS
OXYGEN SATURATION: 96 % | RESPIRATION RATE: 16 BRPM | HEART RATE: 96 BPM | HEIGHT: 64 IN | TEMPERATURE: 98.7 F | WEIGHT: 170 LBS | BODY MASS INDEX: 29.02 KG/M2 | SYSTOLIC BLOOD PRESSURE: 146 MMHG | DIASTOLIC BLOOD PRESSURE: 72 MMHG

## 2022-10-13 DIAGNOSIS — M16.12 PRIMARY OSTEOARTHRITIS OF LEFT HIP: Primary | ICD-10-CM

## 2022-10-13 LAB
ABO/RH: NORMAL
ANTIBODY SCREEN: NORMAL
GLUCOSE BLD-MCNC: 161 MG/DL (ref 70–99)
PERFORMED ON: ABNORMAL

## 2022-10-13 PROCEDURE — 97161 PT EVAL LOW COMPLEX 20 MIN: CPT

## 2022-10-13 PROCEDURE — 97116 GAIT TRAINING THERAPY: CPT

## 2022-10-13 PROCEDURE — 2500000003 HC RX 250 WO HCPCS: Performed by: ORTHOPAEDIC SURGERY

## 2022-10-13 PROCEDURE — 7100000011 HC PHASE II RECOVERY - ADDTL 15 MIN: Performed by: ORTHOPAEDIC SURGERY

## 2022-10-13 PROCEDURE — 6370000000 HC RX 637 (ALT 250 FOR IP): Performed by: ORTHOPAEDIC SURGERY

## 2022-10-13 PROCEDURE — 3700000000 HC ANESTHESIA ATTENDED CARE: Performed by: ORTHOPAEDIC SURGERY

## 2022-10-13 PROCEDURE — 36415 COLL VENOUS BLD VENIPUNCTURE: CPT

## 2022-10-13 PROCEDURE — 3600000015 HC SURGERY LEVEL 5 ADDTL 15MIN: Performed by: ORTHOPAEDIC SURGERY

## 2022-10-13 PROCEDURE — 2580000003 HC RX 258: Performed by: ORTHOPAEDIC SURGERY

## 2022-10-13 PROCEDURE — 3600000005 HC SURGERY LEVEL 5 BASE: Performed by: ORTHOPAEDIC SURGERY

## 2022-10-13 PROCEDURE — 2500000003 HC RX 250 WO HCPCS: Performed by: NURSE ANESTHETIST, CERTIFIED REGISTERED

## 2022-10-13 PROCEDURE — C1776 JOINT DEVICE (IMPLANTABLE): HCPCS | Performed by: ORTHOPAEDIC SURGERY

## 2022-10-13 PROCEDURE — 6360000002 HC RX W HCPCS: Performed by: ANESTHESIOLOGY

## 2022-10-13 PROCEDURE — 86900 BLOOD TYPING SEROLOGIC ABO: CPT

## 2022-10-13 PROCEDURE — 7100000000 HC PACU RECOVERY - FIRST 15 MIN: Performed by: ORTHOPAEDIC SURGERY

## 2022-10-13 PROCEDURE — 3209999900 FLUORO FOR SURGICAL PROCEDURES

## 2022-10-13 PROCEDURE — 2709999900 HC NON-CHARGEABLE SUPPLY: Performed by: ORTHOPAEDIC SURGERY

## 2022-10-13 PROCEDURE — 7100000010 HC PHASE II RECOVERY - FIRST 15 MIN: Performed by: ORTHOPAEDIC SURGERY

## 2022-10-13 PROCEDURE — 7100000001 HC PACU RECOVERY - ADDTL 15 MIN: Performed by: ORTHOPAEDIC SURGERY

## 2022-10-13 PROCEDURE — 86901 BLOOD TYPING SEROLOGIC RH(D): CPT

## 2022-10-13 PROCEDURE — 86850 RBC ANTIBODY SCREEN: CPT

## 2022-10-13 PROCEDURE — 82947 ASSAY GLUCOSE BLOOD QUANT: CPT

## 2022-10-13 PROCEDURE — A4217 STERILE WATER/SALINE, 500 ML: HCPCS | Performed by: ORTHOPAEDIC SURGERY

## 2022-10-13 PROCEDURE — 6360000002 HC RX W HCPCS: Performed by: ORTHOPAEDIC SURGERY

## 2022-10-13 PROCEDURE — 3700000001 HC ADD 15 MINUTES (ANESTHESIA): Performed by: ORTHOPAEDIC SURGERY

## 2022-10-13 PROCEDURE — 73502 X-RAY EXAM HIP UNI 2-3 VIEWS: CPT

## 2022-10-13 PROCEDURE — 6360000002 HC RX W HCPCS: Performed by: NURSE ANESTHETIST, CERTIFIED REGISTERED

## 2022-10-13 DEVICE — HEAD, FEMORAL, CERAMIC, BILOX DELTA, 36MM -4.0
Type: IMPLANTABLE DEVICE | Site: FEMUR | Status: FUNCTIONAL
Brand: DJO SURGICAL

## 2022-10-13 DEVICE — IMPLANTABLE DEVICE
Type: IMPLANTABLE DEVICE | Site: FEMUR | Status: FUNCTIONAL
Brand: ORIGIN

## 2022-10-13 DEVICE — EMPOWR ACET SYSTEM, CUP, HEMISPHERICAL, CLUSTER HOLE, 50MM
Type: IMPLANTABLE DEVICE | Site: ACETABULUM | Status: FUNCTIONAL
Brand: DJO SURGICAL

## 2022-10-13 DEVICE — EMPOWR ACETABULAR, LINER, +4MM/10° , HXE+, 36E
Type: IMPLANTABLE DEVICE | Site: ACETABULUM | Status: FUNCTIONAL
Brand: DJO SURGICAL

## 2022-10-13 RX ORDER — ROPIVACAINE HYDROCHLORIDE 2 MG/ML
INJECTION, SOLUTION EPIDURAL; INFILTRATION; PERINEURAL PRN
Status: DISCONTINUED | OUTPATIENT
Start: 2022-10-13 | End: 2022-10-13 | Stop reason: ALTCHOICE

## 2022-10-13 RX ORDER — SCOLOPAMINE TRANSDERMAL SYSTEM 1 MG/1
1 PATCH, EXTENDED RELEASE TRANSDERMAL ONCE
Status: DISCONTINUED | OUTPATIENT
Start: 2022-10-13 | End: 2022-10-13 | Stop reason: HOSPADM

## 2022-10-13 RX ORDER — CEPHALEXIN 500 MG/1
500 CAPSULE ORAL 4 TIMES DAILY
Qty: 28 CAPSULE | Refills: 0 | Status: SHIPPED | OUTPATIENT
Start: 2022-10-13

## 2022-10-13 RX ORDER — ROCURONIUM BROMIDE 10 MG/ML
INJECTION, SOLUTION INTRAVENOUS PRN
Status: DISCONTINUED | OUTPATIENT
Start: 2022-10-13 | End: 2022-10-13 | Stop reason: SDUPTHER

## 2022-10-13 RX ORDER — LIDOCAINE HYDROCHLORIDE 10 MG/ML
INJECTION, SOLUTION EPIDURAL; INFILTRATION; INTRACAUDAL; PERINEURAL PRN
Status: DISCONTINUED | OUTPATIENT
Start: 2022-10-13 | End: 2022-10-13 | Stop reason: SDUPTHER

## 2022-10-13 RX ORDER — ACETAMINOPHEN 500 MG
1000 TABLET ORAL ONCE
Status: COMPLETED | OUTPATIENT
Start: 2022-10-13 | End: 2022-10-13

## 2022-10-13 RX ORDER — CELECOXIB 200 MG/1
200 CAPSULE ORAL ONCE
Status: COMPLETED | OUTPATIENT
Start: 2022-10-13 | End: 2022-10-13

## 2022-10-13 RX ORDER — OXYCODONE HCL 10 MG/1
10 TABLET, FILM COATED, EXTENDED RELEASE ORAL
Status: COMPLETED | OUTPATIENT
Start: 2022-10-13 | End: 2022-10-13

## 2022-10-13 RX ORDER — OXYCODONE HYDROCHLORIDE 5 MG/1
5 TABLET ORAL
Status: COMPLETED | OUTPATIENT
Start: 2022-10-13 | End: 2022-10-13

## 2022-10-13 RX ORDER — ONDANSETRON 2 MG/ML
4 INJECTION INTRAMUSCULAR; INTRAVENOUS
Status: DISCONTINUED | OUTPATIENT
Start: 2022-10-13 | End: 2022-10-13 | Stop reason: HOSPADM

## 2022-10-13 RX ORDER — SODIUM CHLORIDE, SODIUM LACTATE, POTASSIUM CHLORIDE, CALCIUM CHLORIDE 600; 310; 30; 20 MG/100ML; MG/100ML; MG/100ML; MG/100ML
INJECTION, SOLUTION INTRAVENOUS CONTINUOUS
Status: DISCONTINUED | OUTPATIENT
Start: 2022-10-13 | End: 2022-10-13 | Stop reason: HOSPADM

## 2022-10-13 RX ORDER — HYDROMORPHONE HYDROCHLORIDE 1 MG/ML
0.25 INJECTION, SOLUTION INTRAMUSCULAR; INTRAVENOUS; SUBCUTANEOUS EVERY 5 MIN PRN
Status: DISCONTINUED | OUTPATIENT
Start: 2022-10-13 | End: 2022-10-13 | Stop reason: HOSPADM

## 2022-10-13 RX ORDER — SODIUM CHLORIDE 0.9 % (FLUSH) 0.9 %
5-40 SYRINGE (ML) INJECTION PRN
Status: DISCONTINUED | OUTPATIENT
Start: 2022-10-13 | End: 2022-10-13 | Stop reason: HOSPADM

## 2022-10-13 RX ORDER — DEXAMETHASONE SODIUM PHOSPHATE 10 MG/ML
INJECTION, SOLUTION INTRAMUSCULAR; INTRAVENOUS PRN
Status: DISCONTINUED | OUTPATIENT
Start: 2022-10-13 | End: 2022-10-13 | Stop reason: SDUPTHER

## 2022-10-13 RX ORDER — ASPIRIN 81 MG/1
81 TABLET ORAL 2 TIMES DAILY
Qty: 60 TABLET | Refills: 0 | Status: SHIPPED | OUTPATIENT
Start: 2022-10-13

## 2022-10-13 RX ORDER — FENTANYL CITRATE 50 UG/ML
INJECTION, SOLUTION INTRAMUSCULAR; INTRAVENOUS PRN
Status: DISCONTINUED | OUTPATIENT
Start: 2022-10-13 | End: 2022-10-13 | Stop reason: SDUPTHER

## 2022-10-13 RX ORDER — DEXAMETHASONE SODIUM PHOSPHATE 10 MG/ML
8 INJECTION, SOLUTION INTRAMUSCULAR; INTRAVENOUS ONCE
Status: DISCONTINUED | OUTPATIENT
Start: 2022-10-13 | End: 2022-10-13 | Stop reason: HOSPADM

## 2022-10-13 RX ORDER — PROPOFOL 10 MG/ML
INJECTION, EMULSION INTRAVENOUS PRN
Status: DISCONTINUED | OUTPATIENT
Start: 2022-10-13 | End: 2022-10-13 | Stop reason: SDUPTHER

## 2022-10-13 RX ORDER — TRANEXAMIC ACID 650 1/1
1950 TABLET ORAL
Status: COMPLETED | OUTPATIENT
Start: 2022-10-13 | End: 2022-10-13

## 2022-10-13 RX ORDER — ONDANSETRON 2 MG/ML
INJECTION INTRAMUSCULAR; INTRAVENOUS PRN
Status: DISCONTINUED | OUTPATIENT
Start: 2022-10-13 | End: 2022-10-13 | Stop reason: SDUPTHER

## 2022-10-13 RX ORDER — OXYCODONE HYDROCHLORIDE 5 MG/1
5 TABLET ORAL EVERY 4 HOURS PRN
Qty: 30 TABLET | Refills: 0 | Status: SHIPPED | OUTPATIENT
Start: 2022-10-13 | End: 2022-10-16

## 2022-10-13 RX ORDER — SODIUM CHLORIDE 0.9 % (FLUSH) 0.9 %
5-40 SYRINGE (ML) INJECTION EVERY 12 HOURS SCHEDULED
Status: DISCONTINUED | OUTPATIENT
Start: 2022-10-13 | End: 2022-10-13 | Stop reason: HOSPADM

## 2022-10-13 RX ORDER — SODIUM CHLORIDE 9 MG/ML
INJECTION, SOLUTION INTRAVENOUS PRN
Status: DISCONTINUED | OUTPATIENT
Start: 2022-10-13 | End: 2022-10-13 | Stop reason: HOSPADM

## 2022-10-13 RX ORDER — HYDROMORPHONE HYDROCHLORIDE 1 MG/ML
0.5 INJECTION, SOLUTION INTRAMUSCULAR; INTRAVENOUS; SUBCUTANEOUS EVERY 5 MIN PRN
Status: DISCONTINUED | OUTPATIENT
Start: 2022-10-13 | End: 2022-10-13 | Stop reason: HOSPADM

## 2022-10-13 RX ADMIN — HYDROMORPHONE HYDROCHLORIDE 0.5 MG: 1 INJECTION, SOLUTION INTRAMUSCULAR; INTRAVENOUS; SUBCUTANEOUS at 11:05

## 2022-10-13 RX ADMIN — SODIUM CHLORIDE, SODIUM LACTATE, POTASSIUM CHLORIDE, AND CALCIUM CHLORIDE: 600; 310; 30; 20 INJECTION, SOLUTION INTRAVENOUS at 07:26

## 2022-10-13 RX ADMIN — PROPOFOL 150 MG: 10 INJECTION, EMULSION INTRAVENOUS at 09:07

## 2022-10-13 RX ADMIN — LIDOCAINE HYDROCHLORIDE 50 MG: 10 INJECTION, SOLUTION EPIDURAL; INFILTRATION; INTRACAUDAL; PERINEURAL at 09:07

## 2022-10-13 RX ADMIN — CEFAZOLIN 2000 MG: 2 INJECTION, POWDER, FOR SOLUTION INTRAMUSCULAR; INTRAVENOUS at 09:14

## 2022-10-13 RX ADMIN — ROCURONIUM BROMIDE 70 MG: 10 INJECTION, SOLUTION INTRAVENOUS at 09:07

## 2022-10-13 RX ADMIN — FENTANYL CITRATE 100 MCG: 50 INJECTION, SOLUTION INTRAMUSCULAR; INTRAVENOUS at 09:05

## 2022-10-13 RX ADMIN — ONDANSETRON 4 MG: 2 INJECTION INTRAMUSCULAR; INTRAVENOUS at 10:16

## 2022-10-13 RX ADMIN — HYDROMORPHONE HYDROCHLORIDE 0.5 MG: 1 INJECTION, SOLUTION INTRAMUSCULAR; INTRAVENOUS; SUBCUTANEOUS at 11:11

## 2022-10-13 RX ADMIN — OXYCODONE 5 MG: 5 TABLET ORAL at 13:23

## 2022-10-13 RX ADMIN — SUGAMMADEX 200 MG: 100 INJECTION, SOLUTION INTRAVENOUS at 10:41

## 2022-10-13 RX ADMIN — TRANEXAMIC ACID 1950 MG: 650 TABLET ORAL at 07:27

## 2022-10-13 RX ADMIN — ACETAMINOPHEN 1000 MG: 500 TABLET ORAL at 07:26

## 2022-10-13 RX ADMIN — SODIUM CHLORIDE, SODIUM LACTATE, POTASSIUM CHLORIDE, AND CALCIUM CHLORIDE: 600; 310; 30; 20 INJECTION, SOLUTION INTRAVENOUS at 10:41

## 2022-10-13 RX ADMIN — OXYCODONE HYDROCHLORIDE 10 MG: 10 TABLET, FILM COATED, EXTENDED RELEASE ORAL at 07:26

## 2022-10-13 RX ADMIN — DEXAMETHASONE SODIUM PHOSPHATE 10 MG: 10 INJECTION, SOLUTION INTRAMUSCULAR; INTRAVENOUS at 09:32

## 2022-10-13 RX ADMIN — CELECOXIB 200 MG: 200 CAPSULE ORAL at 07:26

## 2022-10-13 ASSESSMENT — PAIN DESCRIPTION - ORIENTATION
ORIENTATION: LEFT
ORIENTATION_3: RIGHT;LEFT
ORIENTATION: LEFT
ORIENTATION_3: LEFT;RIGHT
ORIENTATION: LEFT

## 2022-10-13 ASSESSMENT — PAIN DESCRIPTION - LOCATION
LOCATION: HIP
LOCATION_3: KNEE
LOCATION: HIP
LOCATION_2: NECK
LOCATION: HIP
LOCATION_3: KNEE
LOCATION_2: NECK

## 2022-10-13 ASSESSMENT — PAIN SCALES - GENERAL
PAINLEVEL_OUTOF10: 6
PAINLEVEL_OUTOF10: 0
PAINLEVEL_OUTOF10: 8
PAINLEVEL_OUTOF10: 0
PAINLEVEL_OUTOF10: 8
PAINLEVEL_OUTOF10: 6
PAINLEVEL_OUTOF10: 6
PAINLEVEL_OUTOF10: 3

## 2022-10-13 ASSESSMENT — PAIN DESCRIPTION - PAIN TYPE
TYPE: SURGICAL PAIN

## 2022-10-13 ASSESSMENT — PAIN DESCRIPTION - FREQUENCY
FREQUENCY: CONTINUOUS

## 2022-10-13 ASSESSMENT — PAIN DESCRIPTION - DESCRIPTORS
DESCRIPTORS: ACHING
DESCRIPTORS_2: OTHER (COMMENT)
DESCRIPTORS_3: ACHING
DESCRIPTORS: ACHING
DESCRIPTORS_2: OTHER (COMMENT)
DESCRIPTORS_3: ACHING
DESCRIPTORS: SORE
DESCRIPTORS: ACHING
DESCRIPTORS_2: OTHER (COMMENT)
DESCRIPTORS_2: OTHER (COMMENT)
DESCRIPTORS: SORE
DESCRIPTORS: ACHING

## 2022-10-13 ASSESSMENT — PAIN DESCRIPTION - ONSET
ONSET: ON-GOING

## 2022-10-13 ASSESSMENT — PAIN DESCRIPTION - INTENSITY
RATING_3: 6
RATING_2: 7
RATING_3: 7
RATING_2: 6
RATING_2: 8
RATING_2: 8

## 2022-10-13 ASSESSMENT — LIFESTYLE VARIABLES: SMOKING_STATUS: 0

## 2022-10-13 NOTE — CARE COORDINATION
HH referral received. Per Makayla Day Navigator, patient agreeable and has chosen [facility]. Referral Faxed. [address] [place] [address] [phone]. [phone]. Please notify [facility] when patient discharges and fax DC Summary,  DC med list and any new [facility] orders. The Patient and/or patient representative was provided with a choice of provider and agrees   with the discharge plan. [x] Yes [] No    Freedom of choice list was provided with basic dialogue that supports the patient's individualized plan of care/goals, treatment preferences and shares the quality data associated with the providers.  [x] Yes [] No  Electronically signed by Paige Lanes on 10/13/2022 at 11:31 AM

## 2022-10-13 NOTE — DISCHARGE INSTRUCTIONS
[facility] [address] [place] [address]  Dr. Kenna Harrington      Total Hip & Bipolar Replacement  Home Instructions     To prevent blood clots, you have been placed on the following medication:  Aspirin 81 mg twice a day for four weeks    Surgical Site Care: Showering is permitted on post op day 2 - Saturday  No submersion in a bath, swimming pool, whirlpool, etc     Home physical therapy 2 days a week and a once a week nurse will be arranged before you get home    Weight Bearing Status:  Full unless you were told otherwise  Use a walker    Precautions  Don't walk for exercise (The longer you are on your feet the more sore you will be)  Stay close to home  You may go for short car rides    Pain Medications  You were given a prescription to fill at your pharmacy  Wean off pain medications as you deem appropriate as long as pain is under control  Take tylenol instead of the pain medicine as you improve                                                                                                                           FEVER of 101.5 or less  Please take a stool softener such as Colace to prevent constipation                   Tylenol x 2                                                                                                                                       Deep breath x 10  Do not drive for two weeks                                                                                    Cough, cough, cough  DO NOT SMOKE, VAPE OR CHEW!!! Recheck in 1 - 11/2 hours    Cold packs  May be used as necessary  Be sure to have a barrier (cloth, clothing, towel) between the site and the ice pack to prevent frostbite    Contact office if  Increased redness, swelling, drainage of any kind, and/or severe pain at surgery site. As well as new onset fevers and or chills. These could signify an infection.   Calf tenderness to touch as well as increased swelling or redness. This could signify a clot. Any rash appears, increased  or new onset nausea/vomiting occur. This may indicate a reaction to a medication. Phone # [phone] ext [phone]. Leave a message for my assistant Siobhan Salas. She will return your call promptly  If you have an emergency text me at [phone] and tell me your name and problem  (Dr. Gopal Mcallister)  Or contact Ortho Navigator at [phone]. Claudia Hendricks)  Follow up with Surgeon at scheduled appointment time.

## 2022-10-13 NOTE — OP NOTE
TOTAL HIP ARTHROPLASTY OPERATIVE NOTE    NAME OF SURGEON / : Kevin Lyons MD  PATIENT:   Sherrie Bolaños  Date: 10/13/2022        Time: 10:23 AM   Referring Physician: ________________________    PREOP DIAGNOSIS:  left hip  Primary osteoarthritis   POSTOP DIAGNOSIS:  Same     PROCEDURE:    Left    Hip arthroplasty (41534)     IMPLANTS:   Implant Name Type Inv. Item Serial No.  Lot No. LRB No. Used Action   CUP ACET CLUS HOLE E 50 MM W/ DOME HOLE PLUG P2 COAT EMPOWR - [id number]  CUP ACET CLUS HOLE E 50 MM W/ DOME HOLE PLUG P2 COAT EMPOWR  [employer]WD [id number] Left 1 Implanted   LINER ACET FACE CHNG 4+ MM 10 DEG E 36X50 MM HXE+ EMPOWR - [id number]  LINER ACET FACE CHNG 4+ MM 10 DEG E 36X50 MM HXE+ EMPOWR  [employer]- [id number] Left 1 Implanted   STEM FEM CLLRD 13 HIP OFFSET ORIGIN - [id number]  STEM FEM CLLRD 13 HIP OFFSET ORIGIN  [facility] ORTHOPEDICS [id number] Left 1 Implanted   HEAD FEM 4- MM 36 MM HIP OFFSET FOR FMP SYS BIOLOX DELT CERM - [id number]  HEAD FEM 4- MM 36 MM HIP OFFSET FOR FMP SYS BIOLOX DELT [facility] [id number] Left 1 Implanted       FINDINGS: None  ASSISTANT:  Franci Roe, certified first assistant. Helped with draping, exposure, retraction, essential steps of the procedure, and with wound closure. ANESTHESIA:  General  EBL:  500 mL  FLUIDS: See anesthesia record  BLOOD PRODUCTS:  None  COMPLICATIONS:  None  SPECIMEN:  None        INDICATIONS:  Patient presents for the above procedure having failed conservative treatment. Patient consents to the procedure above understanding the risks of bleeding, infection, anesthesia, nerve injury, stiffness, and blood clots. Procedure in Detail:    The patient was brought into the operating room, general anesthesia given, and [facility]   The operative extremity was placed in light traction across a padded perineal post.  An antibiotic was given IV. Claudia Ruffing The extremity was prepped with chlorhexidine and alcohol and draped sterilely. Ioband barriers were used. An anterior approach was made to the hip. Careful dissection was carried down to fascia which was longitudinally incised. The tensor muscle was elevated off the medial fascia and a cobra retractor placed around the lateral femoral neck. Rosemarie retractors were used distally between the Sartorious and tensor to expose the vastus aponeurosis. This was released with the bovie to expose the lateral circumflex vessels. These were coagulated and divided with the bovie. A dennis elevator was used to lift the rectus off the capsule and a cobra retractor placed around the medial femoral neck. A bent caio retractor was placed over the superior acetabulum. Traction was applied and the capsule was incised by beginning at the superior acetabulum and extending distally to the intertrochanteric line and then dividing medially and laterally. The anterior capsule was excised. The cobra retractors were placed deep to capsule around the neck. The neck was cut with the saw beginning laterally at the neck-trochanter junction and completed medially 5 mm proximal to the intertrochanteric line. A second saw cut was made parallel to the first 10 mm proximal to the first cut. The ring of neck bone was removed with a kocher clamp and the head removed with a corkscrew. Traction was released and the leg was externally rotated 120 degrees. A caio retractor was placed over the lesser trochanter and the medial hip capsule was released off the inferior femoral neck with a bovie and dennis elevator. Gentle traction was applied and cobra retractors were placed around the acetabulum. The inferior capsule was released and the labrum and foveal were excised. The socket was reamed with a reamer 2 sizes smaller than the head diameter and continued with increasing reamer size until good subchondral bone was found. A maximized view of the pelvis was obtained with the c-arm and the acetabular component was impacted in place at 45 degrees of abduction and 20 degrees of anteversion. The liner was snapped in place. The lift hook for the [facility] was placed around the proximal femur and the leg was externally rotated 120 degrees and the hip was extended and adducted. Bent caio retractors were placed medial to the femoral neck and proximal to the greater trochanter. The lift hook was raised and the lateral capsule was released off the medial surface of the greater trochanter and lateral neck. A cookie cutter was used to remove metaphyseal bone. A rat tail rasp was used to work the lateral bone. The femoral canal was broached from a size 8 up to a size where the broach was axially and rotationally stable. The anteversion of the broach was 10 degrees. The canal was not reamed. A calcar planer was used to remove minimal neck bone. The trial neck and head were placed and the hip was reduced. C-arm showed that the trial filled the canal well and that the leg lengths were even. Offset was similar to the opposite hip. The hip was stable when the leg was externally rotated 90 degrees, the hip extended 20 degrees and an anterior pull applied to the neck. The hip was dislocated and the trial femoral component removed. The actual stem was impacted to the same position and orientation as the trial.  The trunion was cleaned and the chosen head was impacted in place. The hip was reduced. The hip space was filled with diluted, warm betadine. C-arm showed good position of the implants and no fractures. The wound was checked for bleeding and pulse lavaged with antibiotic irrigation. The capsule was closed with 0 vicryl running suture. The tissues were injected with 40 cc of bupivacaine.   The fascia was closed with running 0 vicryl, the subcutaneous layer with 2-0 vicryl and the skin closed with running 3-0 vicryl and prineo. A sterile dressing was placed. The patient was awakened, extubated and transferred to recovery in stable condition. ITERATIONS   Neck Length (mm) Offset: Other Stable Ant?  Leg Length difference  (mm) Stable Post?   -4   Offset liner  yes +3    -4   Offset liner  Broach 2 mm deeper perched =    -4   Offset lip liner 1 oclock  yes =                              Electronically signed by James Ortega MD on 10/13/2022 at 10:23 AM

## 2022-10-13 NOTE — CARE COORDINATION
Spoke with patient regarding MD orders for [facility] services. Patient agreeable and has chosen [facility]. Referral Faxed. [address] [place] [address] [phone]. [phone]. Please notify [address] [place] [address] when patient discharges and fax DC Summary,  DC med list and any new [facility] orders. The Patient and/or patient representative was provided with a choice of provider and agrees   with the discharge plan. [x] Yes [] No    Freedom of choice list was provided with basic dialogue that supports the patient's individualized plan of care/goals, treatment preferences and shares the quality data associated with the providers.  [x] Yes [] No  Electronically signed by Wes Villalpando on 10/13/2022 at 10:03 AM

## 2022-10-13 NOTE — DISCHARGE INSTR - DIET
Good nutrition is important when healing from an illness, injury, or surgery. Follow any nutrition recommendations given to you during your hospital stay. If you were given an oral nutrition supplement while in the hospital, continue to take this supplement at home. You can take it with meals, in-between meals, and/or before bedtime. These supplements can be purchased at most local grocery stores, pharmacies, and chain [employer]-stores. If you have any questions about your diet or nutrition, call the hospital and ask for the dietitian.            Resume home diet

## 2022-10-13 NOTE — ANESTHESIA PRE PROCEDURE
Department of Anesthesiology  Preprocedure Note       Name:  Bennett Weems   Age:  58 y.o.  :  1960                                          MRN:  [id number]         Date:  10/13/2022      Surgeon: Adela Nicholas):  Candice Hensley MD    Procedure: Procedure(s):  LEFT HIP TOTAL ARTHROPLASTY    Medications prior to admission:   Prior to Admission medications    Medication Sig Start Date End Date Taking?  Authorizing Provider   rosuvastatin (CRESTOR) 5 MG tablet Take 5 mg by mouth daily Indications: Changes in Cholesterol    Historical Provider, MD   Cholecalciferol (VITAMIN D3) 1.25 MG (10925 UT) CAPS Take 1 capsule by mouth Twice a Week Tuesday and Saturday    Historical Provider, MD   Coenzyme Q10 (COQ10) 100 MG CAPS Take 1 capsule by mouth Twice a Week Tuesday and Saturday    Historical Provider, MD   vitamin D 25 MCG (1000 UT) CAPS Take 1 capsule by mouth daily    Historical Provider, MD   ezetimibe (ZETIA) 10 MG tablet Take 10 mg by mouth daily 21   Historical Provider, MD   furosemide (LASIX) 20 MG tablet Take 1 tablet by mouth daily 21   Historical Provider, MD   RYBELSUS 7 MG TABS Take 1 tablet by mouth daily 21   Historical Provider, MD   quinapril-hydroCHLOROthiazide (ACCURETIC) 20-12.5 MG per tablet Take 1 tablet by mouth daily 6/3/21   Historical Provider, MD   pioglitazone (ACTOS) 30 MG tablet Take 15 mg by mouth daily    Historical Provider, MD   meloxicam (MOBIC) 15 MG tablet Take 15 mg by mouth daily 18   Historical Provider, MD   metFORMIN (GLUCOPHAGE) 500 MG tablet Take 1,000 mg by mouth 2 times daily (with meals)    Historical Provider, MD       Current medications:    Current Facility-Administered Medications   Medication Dose Route Frequency Provider Last Rate Last Admin    scopolamine (TRANSDERM-SCOP) transdermal patch 1 patch  1 patch TransDERmal Once Candice Hensley MD   1 patch at 10/13/22 0727    dexamethasone (PF) (DECADRON) injection 8 mg  8 mg IntraVENous Once Fly Whiting MD        lactated ringers infusion   IntraVENous Continuous Fly Whiting  mL/hr at 10/13/22 0726 New Bag at 10/13/22 0726    sodium chloride flush 0.9 % injection 5-40 mL  5-40 mL IntraVENous 2 times per day Fly Whiting MD        sodium chloride flush 0.9 % injection 5-40 mL  5-40 mL IntraVENous PRN Fly Whiting MD        0.9 % sodium chloride infusion   IntraVENous PRN Fly Whiting MD        ceFAZolin (ANCEF) 2,000 mg in sterile water 20 mL IV syringe  2,000 mg IntraVENous On Call to [address], [place]           Allergies:  No Known Allergies    Problem List:    Patient Active Problem List   Diagnosis Code    Elevated LFTs R79.89       Past Medical History:        Diagnosis Date    Arthritis     Colon polyps     Diabetes ([facility] [address]     High cholesterol     Hip pain     HPV (human papilloma virus) anogenital infection 06/23/2017    Hypertension        Past Surgical History:        Procedure Laterality Date    BLADDER REPAIR  2017    Dr. Tawnya Baldwni, got her bladder lifted    COLONOSCOPY  03/2014    Dr. Venita Vaz  04/2018    Dr. Yudith Medina, VAGINAL      ovaries remain, done with her bladder repair    LASIK      TUBAL LIGATION         Social History:    Social History     Tobacco Use    Smoking status: Never    Smokeless tobacco: Never   Substance Use Topics    Alcohol use:  No                                Counseling given: Not Answered      Vital Signs (Current):   Vitals:    10/13/22 0714   BP: (!) 147/84   Pulse: 89   Resp: 18   Temp: 97.3 °F (36.3 °C)   TempSrc: Temporal   SpO2: 99%   Weight: 170 lb (77.1 kg)   Height: 5' 4\" (1.626 m)                                              BP Readings from Last 3 Encounters:   10/13/22 (!) 147/84   08/23/21 135/78   11/19/19 136/76       NPO Status: Time of last liquid consumption: 0000 Time of last solid consumption: 0000                        Date of last liquid consumption: 10/12/22                        Date of last solid food consumption: 10/12/22    BMI:   Wt Readings from Last 3 Encounters:   10/13/22 170 lb (77.1 kg)   10/06/22 169 lb (76.7 kg)   08/23/21 177 lb 6.4 oz (80.5 kg)     Body mass index is 29.18 kg/m². CBC:   Lab Results   Component Value Date/Time    WBC 4.3 10/06/2022 09:10 AM    RBC 3.72 10/06/2022 09:10 AM    HGB 11.4 10/06/2022 09:10 AM    HCT 34.7 10/06/2022 09:10 AM    MCV 93.3 10/06/2022 09:10 AM    RDW 13.2 10/06/2022 09:10 AM     10/06/2022 09:10 AM       CMP:   Lab Results   Component Value Date/Time     10/06/2022 09:10 AM    K 3.8 10/06/2022 09:10 AM    CL 99 10/06/2022 09:10 AM    CO2 27 10/06/2022 09:10 AM    BUN 13 10/06/2022 09:10 AM    CREATININE 0.7 10/06/2022 09:10 AM    GFRAA >59 10/06/2022 09:10 AM    LABGLOM >60 10/06/2022 09:10 AM    GLUCOSE 193 10/06/2022 09:10 AM    PROT 8.0 02/24/2015 10:25 AM    CALCIUM 9.4 10/06/2022 09:10 AM    ALKPHOS 73 02/24/2015 10:25 AM    AST 15 02/24/2015 10:25 AM    ALT 29 02/24/2015 10:25 AM       POC Tests: No results for input(s): POCGLU, POCNA, POCK, POCCL, POCBUN, POCHEMO, POCHCT in the last 72 hours.     Coags:   Lab Results   Component Value Date/Time    PROTIME 12.5 10/06/2022 09:10 AM    INR 0.94 10/06/2022 09:10 AM    APTT 29.2 10/06/2022 09:10 AM       HCG (If Applicable): No results found for: PREGTESTUR, PREGSERUM, HCG, HCGQUANT     ABGs: No results found for: PHART, PO2ART, NHG3ISJ, EOS0XEJ, BEART, Z4AZHNUP     Type & Screen (If Applicable):  No results found for: LABABO, LABRH    Drug/Infectious Status (If Applicable):  No results found for: HIV, HEPCAB    COVID-19 Screening (If Applicable): No results found for: COVID19        Anesthesia Evaluation  Patient summary reviewed no history of anesthetic complications:   Airway: Mallampati: I  TM distance: >3 FB   Neck ROM: full  Mouth opening: > = 3 FB   Dental:          Pulmonary:normal exam  breath sounds clear to auscultation  (+) sleep apnea (very mild per patient): on noncompliant,      (-) asthma, recent URI and not a current smoker                           Cardiovascular:  Exercise tolerance: good (>4 METS),   (+) hypertension:,     (-) pacemaker, past MI, CABG/stent and  angina    ECG reviewed  Rhythm: regular  Rate: normal           Beta Blocker:  Not on Beta Blocker         Neuro/Psych:      (-) seizures, TIA and CVA           GI/Hepatic/Renal:        (-) GERD, liver disease and no renal disease       Endo/Other:    (+) Diabetes, .    (-) hypothyroidism, hyperthyroidism               Abdominal:             Vascular:     - DVT. Other Findings:           Anesthesia Plan      general     ASA 2     (Preop famotidine)  Induction: intravenous. MIPS: Postoperative opioids intended and Prophylactic antiemetics administered. Anesthetic plan and risks discussed with patient and spouse. Use of blood products discussed with patient and spouse whom consented to blood products.                      Emelina Rinaldi MD   10/13/2022

## 2022-10-13 NOTE — PROGRESS NOTES
Patient discharged home today with [facility]. Went over all discharge instructions and new medications with patient's  and provided a copy of all new medications to take home. Patient's  verbalized understanding. Patient's stability will be assessed by PT and OP Care RN before discharging home.   Electronically signed by Sukhjinder Alaniz RN on 10/13/2022 at 10:20 AM

## 2022-10-13 NOTE — PROGRESS NOTES
CLINICAL PHARMACY NOTE: MEDS TO BEDS    Total # of Prescriptions Filled: 3   The following medications were delivered to the patient:  Oxycodone 5 mg  Aspirin 81 mg  Cephalexin 500 mg    Additional Documentation:   Delivered Rx's to op-care. Gave Rx's to patients spouse Jaleesa Ferrera. Jaleesaanna Ferrera paid $5.63 copays with [employer]ielsen.

## 2022-10-13 NOTE — PROGRESS NOTES
PT here to ambulate pt. Pt ambulated approximately 25 feet using a walker with the assistance of PT and RN. Pt tolerated the activity/ambulation well.

## 2022-10-13 NOTE — PROGRESS NOTES
Physical Therapy     Facility/Department: [facility] OR  Initial Assessment  PHYSICAL THERAPY EVALUATION      Najma Parr    : 1960  MRN: [id number]   PHYSICIAN:  Mandeep [facility],*  Primary Problem    Patient Active Problem List   Diagnosis    Elevated LFTs       Rehabilitation Diagnosis:  L THR   Primary osteoarthritis of left hip [M16.12]       SERVICE DATE: 10/13/2022        SUBJECTIVE: Patient states that they are planning on discharging home today. Reports some LLE numbness still.     Pain Screening  Patient Currently in Pain: reports minimal pain with activity    PRIOR LEVEL OF FUNCTION:    [x] Independent in community no assistive device     [] Independent in community with assistive device     [] Independent in the house with assistive device     [] Transfer only    []     OBJECTIVE:  Orientation: Within functional Limits      ROM - Passive, Non-operative side     [] Left lower extremity  [x] Right lower extremity       Within functional limits    ROM - Passive, operative side    [x] Left lower extremity  [] Right lower extremity      Hip - extension to 0 degrees and flexion to 80 in sitting    Knee - extension to 0 degrees in supine and flexion to 80 in sitting        STRENGTH - Non-operative side    [] Left lower extremity  [x] Right lower extremity       Within functional limits    STRENGTH - operative side    [x] Left lower extremity  [] Right lower extremity    Grossly  3-/5        TRANSFERS   Sit to stand     [] CGA [x] Minimum        Bed to chair     [] CGA [x] Minimum    Bed mobility   Supine to sit      [] CGA [x] Minimum      Scoot  [] Side to side  [] Up and down     [] CGA [] Minimum    AMBULATION   Weight bearing: - WBAT      Distance: 12'     Device: [employer] - The patient has been trained on the use of this equipment     Assistance:       [] CGA [] Modified Independent [] Stand by / Supervision [x] Minimum         BALANCE   Sitting    Good    Standing    Good     Tx Initiated: w/c transfer, ambulation, dynamic balance activity, education    ASSESSMENT   Activity limitations: Decreased functional mobility   Patient will benefit from continuing skilled physical therapy to improve mobility    Activity Tolerance  Activity Tolerance: Patient Tolerated treatment well     PLAN        Physical therapy to see 7 X/ week for 2 weeks then reassess. Plan of care to include:   Current Treatment Recommendations: Functional Mobility Training, Transfer Training  Gait Training, Safety Education & Training, Patient/Caregiver Education & Training    PT Education     Precautions; Transfer Training; General Safety; Family Education; Equipment; Weight-bearing Education; Gait Training; Functional Mobility Training;  Step Training      GOALS    Short term goals  Time Frame for Short term goals: 2 weeks  Short term goal 1: Independent with bed mobility and transfers  Short term goal 2: Ambulate 400 feet independently  Short term goal 3: Verbalize understanding of ascending and descending steps    Discharge Recommendations:  Home with assist PRN, Patient would benefit from continued therapy after discharge     Comments:  Patient seen in same day surgery and nursing is actively managing pain  Patient instructed to request assistance before getting up  Nursing updated      Electronically signed by Mandeep Munroe PT on 10/13/2022 at 2:19 PM

## 2022-10-13 NOTE — ANESTHESIA POSTPROCEDURE EVALUATION
Department of Anesthesiology  Postprocedure Note    Patient: Barbara Nguyen  MRN: [id number]  [place]: 1960  Date of evaluation: 10/13/2022      Procedure Summary     Date: 10/13/22 Room / Location: [address]    Anesthesia Start: 3810 Anesthesia Stop: 7746    Procedure: LEFT HIP TOTAL ARTHROPLASTY (Left: Hip) Diagnosis:       Primary osteoarthritis of left hip      (Primary osteoarthritis of left hip [M16.12])    Surgeons: Fabiola Mccauley MD Responsible Provider: NEHEMIAS Butler CRNA    Anesthesia Type: general ASA Status: 2          Anesthesia Type: No value filed.     Maira Phase I: Maira Score: 9    Maira Phase II:        Anesthesia Post Evaluation    Patient location during evaluation: PACU  Patient participation: complete - patient participated  Level of consciousness: sleepy but conscious  Pain score: 0  Airway patency: patent  Nausea & Vomiting: no nausea and no vomiting  Complications: no  Cardiovascular status: hemodynamically stable  Respiratory status: acceptable  Hydration status: stable

## 2022-10-14 ENCOUNTER — TELEPHONE (OUTPATIENT)
Dept: INPATIENT UNIT | Age: 62
End: 2022-10-14

## 2023-02-16 ENCOUNTER — HOSPITAL ENCOUNTER (OUTPATIENT)
Dept: PREADMISSION TESTING | Age: 63
Discharge: HOME | End: 2023-02-20
Payer: COMMERCIAL

## 2023-02-16 VITALS — BODY MASS INDEX: 29.18 KG/M2 | WEIGHT: 170 LBS

## 2023-02-16 LAB
ANION GAP SERPL CALCULATED.3IONS-SCNC: 10 MMOL/L (ref 7–19)
APTT: 29.4 SEC (ref 26–36.2)
BASOPHILS ABSOLUTE: 0 K/UL (ref 0–0.2)
BASOPHILS RELATIVE PERCENT: 0.4 % (ref 0–1)
BUN BLDV-MCNC: 17 MG/DL (ref 8–23)
CALCIUM SERPL-MCNC: 9.8 MG/DL (ref 8.8–10.2)
CHLORIDE BLD-SCNC: 103 MMOL/L (ref 98–111)
CO2: 28 MMOL/L (ref 22–29)
CREAT SERPL-MCNC: 0.8 MG/DL (ref 0.5–0.9)
EOSINOPHILS ABSOLUTE: 0.1 K/UL (ref 0–0.6)
EOSINOPHILS RELATIVE PERCENT: 1.3 % (ref 0–5)
GFR SERPL CREATININE-BSD FRML MDRD: >60 ML/MIN/{1.73_M2}
GLUCOSE BLD-MCNC: 122 MG/DL (ref 74–109)
HBA1C MFR BLD: 7.9 % (ref 4–6)
HCT VFR BLD CALC: 34.3 % (ref 37–47)
HEMOGLOBIN: 11.3 G/DL (ref 12–16)
IMMATURE GRANULOCYTES #: 0 K/UL
INR BLD: 0.91 (ref 0.88–1.18)
LYMPHOCYTES ABSOLUTE: 2 K/UL (ref 1.1–4.5)
LYMPHOCYTES RELATIVE PERCENT: 37.6 % (ref 20–40)
MCH RBC QN AUTO: 29.9 PG (ref 27–31)
MCHC RBC AUTO-ENTMCNC: 32.9 G/DL (ref 33–37)
MCV RBC AUTO: 90.7 FL (ref 81–99)
MONOCYTES ABSOLUTE: 0.4 K/UL (ref 0–0.9)
MONOCYTES RELATIVE PERCENT: 8.2 % (ref 0–10)
MRSA SCREEN RT-PCR: NOT DETECTED
NEUTROPHILS ABSOLUTE: 2.7 K/UL (ref 1.5–7.5)
NEUTROPHILS RELATIVE PERCENT: 52.3 % (ref 50–65)
PDW BLD-RTO: 12.9 % (ref 11.5–14.5)
PLATELET # BLD: 258 K/UL (ref 130–400)
PMV BLD AUTO: 9.9 FL (ref 9.4–12.3)
POTASSIUM SERPL-SCNC: 3.9 MMOL/L (ref 3.5–5)
PROTHROMBIN TIME: 12.1 SEC (ref 12–14.6)
RBC # BLD: 3.78 M/UL (ref 4.2–5.4)
SODIUM BLD-SCNC: 141 MMOL/L (ref 136–145)
WBC # BLD: 5.2 K/UL (ref 4.8–10.8)

## 2023-02-16 PROCEDURE — 85730 THROMBOPLASTIN TIME PARTIAL: CPT

## 2023-02-16 PROCEDURE — 93005 ELECTROCARDIOGRAM TRACING: CPT | Performed by: ORTHOPAEDIC SURGERY

## 2023-02-16 PROCEDURE — 85025 COMPLETE CBC W/AUTO DIFF WBC: CPT

## 2023-02-16 PROCEDURE — 85610 PROTHROMBIN TIME: CPT

## 2023-02-16 PROCEDURE — 80048 BASIC METABOLIC PNL TOTAL CA: CPT

## 2023-02-16 PROCEDURE — 87641 MR-STAPH DNA AMP PROBE: CPT

## 2023-02-16 PROCEDURE — 83036 HEMOGLOBIN GLYCOSYLATED A1C: CPT

## 2023-02-16 RX ORDER — ERGOCALCIFEROL 1.25 MG/1
50000 CAPSULE ORAL WEEKLY
COMMUNITY

## 2023-02-16 RX ORDER — LISINOPRIL AND HYDROCHLOROTHIAZIDE 20; 12.5 MG/1; MG/1
1 TABLET ORAL DAILY
COMMUNITY

## 2023-02-16 NOTE — DISCHARGE INSTRUCTIONS
The day before surgery you will receive a phone call from the surgery nurse to let you know what time to arrive on the day of surgery. This call will usually be between 2-4 PM. If you do not receive a phone call by 4 PM the day before your surgery please call [phone] and let them know you have not received an arrival time. If your surgery is on Monday, your call will be on the Friday before your Monday surgery. MEDICATION INSTRUCTIONS PRIOR TO YOUR SURGERY    Night before surgery:      ________Do not take Metformin (you will not be eating or drinking after midnight)      The morning of surgery: You can take all your usual prescribed medications with a small sip of water. DO NOT TAKE ANY DIABETIC MEDICATIONS the morning of your surgery. DO NOT TAKE ANY SUPPLEMENTS or over the counter medications the morning of  surgery. Do not take Lisinopril the morning of surgery      BACTROBAN OINTMENT for the NARES    A script for Bactroban ointment has been call to your pharmacy or was given to you in written form by your surgeon. The guidelines for the ointment use are as follows:    1)  Start using the ointment 7 days before your surgery date    2)  Use the ointment two times a day - morning and night    3)  Place the ointment on a Q-tip and swirl up in your nose making sure you cover completely       the skin just inside of each nostril. Use one end of the Q-tip for each nostril. Chlorhexidine Gluconate 4% Solution    Patient should shower with this soap a minimum of 3 consecutive showers (2 nights before surgery, the night before surgery and the morning of surgery) washing from the neck down (avoiding contact with genitalia). DO NOT [address] YOUR HAIR OR FACE WITH THIS SOAP. When washing with this soap, apply enough to suds up the body thoroughly, turn the water away from your body and allow the soap suds to remain on the body for 2 full minutes, then rinse body completely.   After using this soap on the body, please do not apply powders or lotions to your body. After the shower the night before surgery, please dry off with a new towel, sleep in new freshly laundered pj's, and change your bed linen before going to sleep. PREOPERATIVE GUIDELINES WHEN RECEIVING ANESTHESIA    Do not eat or drink anything after midnight, the night before your surgery. This is extremely important for your safety. Take a bath (or shower) the night before your surgery and you may brush your teeth the morning of your surgery. You will be scheduled to arrive at the hospital 2 hours before your surgery, or follow your surgeon's instructions. Dress comfortably. Wear loose clothing that will be easy to remove and comfortable for your trip home. You may wear eyeglasses or contacts but bring your cases with you as they must be remove before your surgery. Hearing aids and dentures will need to be removed before your surgery. Do not wear any jewelry, including body jewelry. All jewelry will need to be removed prior to your surgery. Do not wear fingernail polish or make-up. It is best not to bring any valuables with you. If you are to stay in the hospital overnight, bring your robe, slippers and personal toiletries that you may need. POSTOPERATIVE GUIDELINES AFTER RECEIVING ANESTHESIA    If you are to go home after your surgery, you will need a responsible adult to drive you home. You will not be able to take public transportation after your discharge from the Operative Care Unit unless you are accompanied by a        responsible adult. On returning home, be sure to follow your physician's orders regarding diet, activity and medications. Remember, surgery with general anesthesia or sedation may leave you sleepy, very tired and with a decreased appetite for 12 to 24 hours.     If you develop any post-surgical complications or problems, call your surgeon or [facility] Emergency Department ([phone]). [address] [place] [address] for Surgery Patients-Revised 6-    Visitors for surgery patients are essential for the patient's emotional well-being and care       post operatively. 2.   Visitor Expectations and Limitations         3. One visitor allowed with patients in the preop/postop rooms. 4.  A second visitor may sit in the waiting area. 5.  No children under 13 allowed in the pre-post op areas unless they are the patient. 6.  Two people may be with an underage surgical/procedural patient in preop/postop        room. 7.  If you are admitted to the hospital post operatively, there are NO RESTRICTIONS on       the floor at this time. 8.  If you are admitted to ICU postoperatively, you may have one visitor in the room from        7A-7P. A second visitor may sit in the ICU waiting room. No overnight visitors in         ICU waiting room.

## 2023-02-18 LAB
EKG P AXIS: 65 DEGREES
EKG P-R INTERVAL: 156 MS
EKG Q-T INTERVAL: 422 MS
EKG QRS DURATION: 128 MS
EKG QTC CALCULATION (BAZETT): 455 MS
EKG T AXIS: 77 DEGREES

## 2023-02-18 PROCEDURE — 93010 ELECTROCARDIOGRAM REPORT: CPT | Performed by: INTERNAL MEDICINE

## 2023-03-09 ENCOUNTER — ANESTHESIA EVENT (OUTPATIENT)
Dept: OPERATING ROOM | Age: 63
End: 2023-03-09
Payer: COMMERCIAL

## 2023-03-09 ENCOUNTER — ANESTHESIA (OUTPATIENT)
Dept: OPERATING ROOM | Age: 63
End: 2023-03-09
Payer: COMMERCIAL

## 2023-03-09 ENCOUNTER — HOSPITAL ENCOUNTER (OUTPATIENT)
Age: 63
Setting detail: OUTPATIENT SURGERY
Discharge: HOME | End: 2023-03-09
Attending: ORTHOPAEDIC SURGERY | Admitting: ORTHOPAEDIC SURGERY
Payer: COMMERCIAL

## 2023-03-09 VITALS
WEIGHT: 172 LBS | OXYGEN SATURATION: 95 % | RESPIRATION RATE: 12 BRPM | TEMPERATURE: 98 F | HEART RATE: 82 BPM | BODY MASS INDEX: 29.37 KG/M2 | SYSTOLIC BLOOD PRESSURE: 126 MMHG | HEIGHT: 64 IN | DIASTOLIC BLOOD PRESSURE: 80 MMHG

## 2023-03-09 DIAGNOSIS — M17.12 PRIMARY OSTEOARTHRITIS OF LEFT KNEE: Primary | ICD-10-CM

## 2023-03-09 PROBLEM — E78.00 PURE HYPERCHOLESTEROLEMIA: Chronic | Status: ACTIVE | Noted: 2023-03-09

## 2023-03-09 PROBLEM — K21.9 GASTROESOPHAGEAL REFLUX DISEASE WITHOUT ESOPHAGITIS: Chronic | Status: ACTIVE | Noted: 2023-03-09

## 2023-03-09 PROBLEM — I10 PRIMARY HYPERTENSION: Chronic | Status: ACTIVE | Noted: 2023-03-09

## 2023-03-09 PROBLEM — E11.65 TYPE 2 DIABETES MELLITUS WITH HYPERGLYCEMIA, WITHOUT LONG-TERM CURRENT USE OF INSULIN (HCC): Chronic | Status: ACTIVE | Noted: 2023-03-09

## 2023-03-09 PROBLEM — K59.03 DRUG-INDUCED CONSTIPATION: Status: ACTIVE | Noted: 2023-03-09

## 2023-03-09 LAB
GLUCOSE BLD-MCNC: 157 MG/DL (ref 70–99)
PERFORMED ON: ABNORMAL

## 2023-03-09 PROCEDURE — 7100000010 HC PHASE II RECOVERY - FIRST 15 MIN: Performed by: ORTHOPAEDIC SURGERY

## 2023-03-09 PROCEDURE — 6360000002 HC RX W HCPCS: Performed by: ORTHOPAEDIC SURGERY

## 2023-03-09 PROCEDURE — 97162 PT EVAL MOD COMPLEX 30 MIN: CPT

## 2023-03-09 PROCEDURE — 2500000003 HC RX 250 WO HCPCS: Performed by: ANESTHESIOLOGY

## 2023-03-09 PROCEDURE — 97116 GAIT TRAINING THERAPY: CPT

## 2023-03-09 PROCEDURE — C1776 JOINT DEVICE (IMPLANTABLE): HCPCS | Performed by: ORTHOPAEDIC SURGERY

## 2023-03-09 PROCEDURE — 6370000000 HC RX 637 (ALT 250 FOR IP): Performed by: ORTHOPAEDIC SURGERY

## 2023-03-09 PROCEDURE — 2500000003 HC RX 250 WO HCPCS: Performed by: ORTHOPAEDIC SURGERY

## 2023-03-09 PROCEDURE — 3700000001 HC ADD 15 MINUTES (ANESTHESIA): Performed by: ORTHOPAEDIC SURGERY

## 2023-03-09 PROCEDURE — 2709999900 HC NON-CHARGEABLE SUPPLY: Performed by: ORTHOPAEDIC SURGERY

## 2023-03-09 PROCEDURE — C1713 ANCHOR/SCREW BN/BN,TIS/BN: HCPCS | Performed by: ORTHOPAEDIC SURGERY

## 2023-03-09 PROCEDURE — 6360000002 HC RX W HCPCS: Performed by: ANESTHESIOLOGY

## 2023-03-09 PROCEDURE — 3700000000 HC ANESTHESIA ATTENDED CARE: Performed by: ORTHOPAEDIC SURGERY

## 2023-03-09 PROCEDURE — A4217 STERILE WATER/SALINE, 500 ML: HCPCS | Performed by: ORTHOPAEDIC SURGERY

## 2023-03-09 PROCEDURE — 3600000005 HC SURGERY LEVEL 5 BASE: Performed by: ORTHOPAEDIC SURGERY

## 2023-03-09 PROCEDURE — 2580000003 HC RX 258: Performed by: ANESTHESIOLOGY

## 2023-03-09 PROCEDURE — 82962 GLUCOSE BLOOD TEST: CPT

## 2023-03-09 PROCEDURE — 64447 NJX AA&/STRD FEMORAL NRV IMG: CPT

## 2023-03-09 PROCEDURE — 2580000003 HC RX 258: Performed by: ORTHOPAEDIC SURGERY

## 2023-03-09 PROCEDURE — 6360000002 HC RX W HCPCS

## 2023-03-09 PROCEDURE — 7100000000 HC PACU RECOVERY - FIRST 15 MIN: Performed by: ORTHOPAEDIC SURGERY

## 2023-03-09 PROCEDURE — 2720000010 HC SURG SUPPLY STERILE: Performed by: ORTHOPAEDIC SURGERY

## 2023-03-09 PROCEDURE — 7100000011 HC PHASE II RECOVERY - ADDTL 15 MIN: Performed by: ORTHOPAEDIC SURGERY

## 2023-03-09 PROCEDURE — 7100000001 HC PACU RECOVERY - ADDTL 15 MIN: Performed by: ORTHOPAEDIC SURGERY

## 2023-03-09 PROCEDURE — 2500000003 HC RX 250 WO HCPCS

## 2023-03-09 PROCEDURE — 3600000015 HC SURGERY LEVEL 5 ADDTL 15MIN: Performed by: ORTHOPAEDIC SURGERY

## 2023-03-09 RX ORDER — SCOLOPAMINE TRANSDERMAL SYSTEM 1 MG/1
1 PATCH, EXTENDED RELEASE TRANSDERMAL ONCE
Status: DISCONTINUED | OUTPATIENT
Start: 2023-03-09 | End: 2023-03-09 | Stop reason: HOSPADM

## 2023-03-09 RX ORDER — ACETAMINOPHEN 500 MG
1000 TABLET ORAL ONCE
Status: COMPLETED | OUTPATIENT
Start: 2023-03-09 | End: 2023-03-09

## 2023-03-09 RX ORDER — ROPIVACAINE HYDROCHLORIDE 2 MG/ML
INJECTION, SOLUTION EPIDURAL; INFILTRATION; PERINEURAL PRN
Status: DISCONTINUED | OUTPATIENT
Start: 2023-03-09 | End: 2023-03-09 | Stop reason: ALTCHOICE

## 2023-03-09 RX ORDER — ONDANSETRON 2 MG/ML
4 INJECTION INTRAMUSCULAR; INTRAVENOUS
Status: COMPLETED | OUTPATIENT
Start: 2023-03-09 | End: 2023-03-09

## 2023-03-09 RX ORDER — SODIUM CHLORIDE 0.9 % (FLUSH) 0.9 %
5-40 SYRINGE (ML) INJECTION PRN
Status: DISCONTINUED | OUTPATIENT
Start: 2023-03-09 | End: 2023-03-09 | Stop reason: HOSPADM

## 2023-03-09 RX ORDER — SODIUM CHLORIDE 9 MG/ML
INJECTION, SOLUTION INTRAVENOUS PRN
Status: DISCONTINUED | OUTPATIENT
Start: 2023-03-09 | End: 2023-03-09 | Stop reason: HOSPADM

## 2023-03-09 RX ORDER — TRANEXAMIC ACID 650 MG/1
1950 TABLET ORAL
Status: COMPLETED | OUTPATIENT
Start: 2023-03-09 | End: 2023-03-09

## 2023-03-09 RX ORDER — HYDROMORPHONE HYDROCHLORIDE 1 MG/ML
0.5 INJECTION, SOLUTION INTRAMUSCULAR; INTRAVENOUS; SUBCUTANEOUS EVERY 5 MIN PRN
Status: DISCONTINUED | OUTPATIENT
Start: 2023-03-09 | End: 2023-03-09 | Stop reason: HOSPADM

## 2023-03-09 RX ORDER — OXYCODONE HYDROCHLORIDE 5 MG/1
5 TABLET ORAL EVERY 4 HOURS PRN
Qty: 40 TABLET | Refills: 0 | Status: SHIPPED | OUTPATIENT
Start: 2023-03-09 | End: 2023-03-12

## 2023-03-09 RX ORDER — ROCURONIUM BROMIDE 10 MG/ML
INJECTION, SOLUTION INTRAVENOUS PRN
Status: DISCONTINUED | OUTPATIENT
Start: 2023-03-09 | End: 2023-03-09 | Stop reason: SDUPTHER

## 2023-03-09 RX ORDER — ROPIVACAINE HYDROCHLORIDE 5 MG/ML
INJECTION, SOLUTION EPIDURAL; INFILTRATION; PERINEURAL
Status: COMPLETED
Start: 2023-03-09 | End: 2023-03-09

## 2023-03-09 RX ORDER — SODIUM CHLORIDE, SODIUM LACTATE, POTASSIUM CHLORIDE, CALCIUM CHLORIDE 600; 310; 30; 20 MG/100ML; MG/100ML; MG/100ML; MG/100ML
INJECTION, SOLUTION INTRAVENOUS CONTINUOUS
Status: DISCONTINUED | OUTPATIENT
Start: 2023-03-09 | End: 2023-03-09 | Stop reason: HOSPADM

## 2023-03-09 RX ORDER — PROPOFOL 10 MG/ML
INJECTION, EMULSION INTRAVENOUS PRN
Status: DISCONTINUED | OUTPATIENT
Start: 2023-03-09 | End: 2023-03-09 | Stop reason: SDUPTHER

## 2023-03-09 RX ORDER — FENTANYL CITRATE 50 UG/ML
INJECTION, SOLUTION INTRAMUSCULAR; INTRAVENOUS PRN
Status: DISCONTINUED | OUTPATIENT
Start: 2023-03-09 | End: 2023-03-09 | Stop reason: SDUPTHER

## 2023-03-09 RX ORDER — ONDANSETRON 2 MG/ML
INJECTION INTRAMUSCULAR; INTRAVENOUS PRN
Status: DISCONTINUED | OUTPATIENT
Start: 2023-03-09 | End: 2023-03-09 | Stop reason: SDUPTHER

## 2023-03-09 RX ORDER — DEXMEDETOMIDINE HYDROCHLORIDE 100 UG/ML
INJECTION, SOLUTION INTRAVENOUS PRN
Status: DISCONTINUED | OUTPATIENT
Start: 2023-03-09 | End: 2023-03-09 | Stop reason: SDUPTHER

## 2023-03-09 RX ORDER — CELECOXIB 200 MG/1
200 CAPSULE ORAL ONCE
Status: COMPLETED | OUTPATIENT
Start: 2023-03-09 | End: 2023-03-09

## 2023-03-09 RX ORDER — SODIUM CHLORIDE 0.9 % (FLUSH) 0.9 %
5-40 SYRINGE (ML) INJECTION EVERY 12 HOURS SCHEDULED
Status: DISCONTINUED | OUTPATIENT
Start: 2023-03-09 | End: 2023-03-09 | Stop reason: HOSPADM

## 2023-03-09 RX ORDER — CEPHALEXIN 500 MG/1
500 CAPSULE ORAL 4 TIMES DAILY
Qty: 28 CAPSULE | Refills: 0 | Status: SHIPPED | OUTPATIENT
Start: 2023-03-09

## 2023-03-09 RX ORDER — NITROGLYCERIN 20 MG/100ML
INJECTION INTRAVENOUS PRN
Status: DISCONTINUED | OUTPATIENT
Start: 2023-03-09 | End: 2023-03-09 | Stop reason: SDUPTHER

## 2023-03-09 RX ORDER — OXYCODONE HCL 10 MG/1
10 TABLET, FILM COATED, EXTENDED RELEASE ORAL
Status: COMPLETED | OUTPATIENT
Start: 2023-03-09 | End: 2023-03-09

## 2023-03-09 RX ORDER — HYDROMORPHONE HYDROCHLORIDE 1 MG/ML
0.25 INJECTION, SOLUTION INTRAMUSCULAR; INTRAVENOUS; SUBCUTANEOUS EVERY 5 MIN PRN
Status: DISCONTINUED | OUTPATIENT
Start: 2023-03-09 | End: 2023-03-09 | Stop reason: HOSPADM

## 2023-03-09 RX ORDER — LIDOCAINE HYDROCHLORIDE 10 MG/ML
INJECTION, SOLUTION EPIDURAL; INFILTRATION; INTRACAUDAL; PERINEURAL PRN
Status: DISCONTINUED | OUTPATIENT
Start: 2023-03-09 | End: 2023-03-09 | Stop reason: SDUPTHER

## 2023-03-09 RX ORDER — MIDAZOLAM HYDROCHLORIDE 2 MG/2ML
2 INJECTION, SOLUTION INTRAMUSCULAR; INTRAVENOUS
Status: COMPLETED | OUTPATIENT
Start: 2023-03-09 | End: 2023-03-09

## 2023-03-09 RX ORDER — DEXAMETHASONE SODIUM PHOSPHATE 10 MG/ML
8 INJECTION, SOLUTION INTRAMUSCULAR; INTRAVENOUS ONCE
Status: DISCONTINUED | OUTPATIENT
Start: 2023-03-09 | End: 2023-03-09 | Stop reason: HOSPADM

## 2023-03-09 RX ORDER — ROPIVACAINE HYDROCHLORIDE 5 MG/ML
INJECTION, SOLUTION EPIDURAL; INFILTRATION; PERINEURAL
Status: COMPLETED | OUTPATIENT
Start: 2023-03-09 | End: 2023-03-09

## 2023-03-09 RX ORDER — ASPIRIN 81 MG/1
81 TABLET ORAL 2 TIMES DAILY
Qty: 60 TABLET | Refills: 0 | Status: SHIPPED | OUTPATIENT
Start: 2023-03-09

## 2023-03-09 RX ORDER — DEXAMETHASONE SODIUM PHOSPHATE 10 MG/ML
INJECTION, SOLUTION INTRAMUSCULAR; INTRAVENOUS PRN
Status: DISCONTINUED | OUTPATIENT
Start: 2023-03-09 | End: 2023-03-09 | Stop reason: SDUPTHER

## 2023-03-09 RX ORDER — OXYCODONE HYDROCHLORIDE 5 MG/1
5 TABLET ORAL
Status: COMPLETED | OUTPATIENT
Start: 2023-03-09 | End: 2023-03-09

## 2023-03-09 RX ADMIN — SODIUM CHLORIDE, SODIUM LACTATE, POTASSIUM CHLORIDE, AND CALCIUM CHLORIDE: 600; 310; 30; 20 INJECTION, SOLUTION INTRAVENOUS at 10:53

## 2023-03-09 RX ADMIN — HYDROMORPHONE HYDROCHLORIDE 0.5 MG: 1 INJECTION, SOLUTION INTRAMUSCULAR; INTRAVENOUS; SUBCUTANEOUS at 12:05

## 2023-03-09 RX ADMIN — PROPOFOL 30 MG: 10 INJECTION, EMULSION INTRAVENOUS at 10:48

## 2023-03-09 RX ADMIN — SUGAMMADEX 100 MG: 100 INJECTION, SOLUTION INTRAVENOUS at 11:36

## 2023-03-09 RX ADMIN — PHENYLEPHRINE HYDROCHLORIDE 100 MCG: 10 INJECTION INTRAVENOUS at 11:23

## 2023-03-09 RX ADMIN — ROCURONIUM BROMIDE 50 MG: 10 INJECTION, SOLUTION INTRAVENOUS at 10:03

## 2023-03-09 RX ADMIN — SUGAMMADEX 100 MG: 100 INJECTION, SOLUTION INTRAVENOUS at 11:34

## 2023-03-09 RX ADMIN — OXYCODONE HYDROCHLORIDE 10 MG: 10 TABLET, FILM COATED, EXTENDED RELEASE ORAL at 07:59

## 2023-03-09 RX ADMIN — ACETAMINOPHEN 1000 MG: 500 TABLET ORAL at 07:58

## 2023-03-09 RX ADMIN — CEFAZOLIN 2000 MG: 2 INJECTION, POWDER, FOR SOLUTION INTRAMUSCULAR; INTRAVENOUS at 10:15

## 2023-03-09 RX ADMIN — HYDROMORPHONE HYDROCHLORIDE 0.5 MG: 1 INJECTION, SOLUTION INTRAMUSCULAR; INTRAVENOUS; SUBCUTANEOUS at 12:18

## 2023-03-09 RX ADMIN — PROPOFOL 80 MG: 10 INJECTION, EMULSION INTRAVENOUS at 10:03

## 2023-03-09 RX ADMIN — ONDANSETRON 4 MG: 2 INJECTION INTRAMUSCULAR; INTRAVENOUS at 13:49

## 2023-03-09 RX ADMIN — DEXAMETHASONE SODIUM PHOSPHATE 6 MG: 10 INJECTION, SOLUTION INTRAMUSCULAR; INTRAVENOUS at 10:03

## 2023-03-09 RX ADMIN — OXYCODONE HYDROCHLORIDE 5 MG: 5 TABLET ORAL at 13:44

## 2023-03-09 RX ADMIN — SODIUM CHLORIDE, PRESERVATIVE FREE 20 MG: 5 INJECTION INTRAVENOUS at 09:14

## 2023-03-09 RX ADMIN — FENTANYL CITRATE 50 MCG: 0.05 INJECTION, SOLUTION INTRAMUSCULAR; INTRAVENOUS at 10:09

## 2023-03-09 RX ADMIN — ROCURONIUM BROMIDE 20 MG: 10 INJECTION, SOLUTION INTRAVENOUS at 10:52

## 2023-03-09 RX ADMIN — NITROGLYCERIN 40 MCG: 20 INJECTION INTRAVENOUS at 10:35

## 2023-03-09 RX ADMIN — TRANEXAMIC ACID 1950 MG: 650 TABLET ORAL at 07:59

## 2023-03-09 RX ADMIN — FENTANYL CITRATE 50 MCG: 0.05 INJECTION, SOLUTION INTRAMUSCULAR; INTRAVENOUS at 10:29

## 2023-03-09 RX ADMIN — ROPIVACAINE HYDROCHLORIDE 20 ML: 5 INJECTION, SOLUTION EPIDURAL; INFILTRATION; PERINEURAL at 09:21

## 2023-03-09 RX ADMIN — MIDAZOLAM 2 MG: 1 INJECTION INTRAMUSCULAR; INTRAVENOUS at 09:15

## 2023-03-09 RX ADMIN — FENTANYL CITRATE 50 MCG: 0.05 INJECTION, SOLUTION INTRAMUSCULAR; INTRAVENOUS at 10:03

## 2023-03-09 RX ADMIN — CELECOXIB 200 MG: 200 CAPSULE ORAL at 07:58

## 2023-03-09 RX ADMIN — PROPOFOL 30 MG: 10 INJECTION, EMULSION INTRAVENOUS at 10:07

## 2023-03-09 RX ADMIN — FENTANYL CITRATE 50 MCG: 0.05 INJECTION, SOLUTION INTRAMUSCULAR; INTRAVENOUS at 10:48

## 2023-03-09 RX ADMIN — LIDOCAINE HYDROCHLORIDE 5 ML: 10 INJECTION, SOLUTION EPIDURAL; INFILTRATION; INTRACAUDAL; PERINEURAL at 10:03

## 2023-03-09 RX ADMIN — DEXMEDETOMIDINE HYDROCHLORIDE 10 MCG: 100 INJECTION, SOLUTION, CONCENTRATE INTRAVENOUS at 10:48

## 2023-03-09 RX ADMIN — ONDANSETRON 4 MG: 2 INJECTION INTRAMUSCULAR; INTRAVENOUS at 11:36

## 2023-03-09 RX ADMIN — PROPOFOL 50 MG: 10 INJECTION, EMULSION INTRAVENOUS at 10:05

## 2023-03-09 RX ADMIN — FENTANYL CITRATE 50 MCG: 0.05 INJECTION, SOLUTION INTRAMUSCULAR; INTRAVENOUS at 10:35

## 2023-03-09 RX ADMIN — NITROGLYCERIN 40 MCG: 20 INJECTION INTRAVENOUS at 10:29

## 2023-03-09 RX ADMIN — PHENYLEPHRINE HYDROCHLORIDE 50 MCG: 10 INJECTION INTRAVENOUS at 11:04

## 2023-03-09 RX ADMIN — PHENYLEPHRINE HYDROCHLORIDE 50 MCG: 10 INJECTION INTRAVENOUS at 11:10

## 2023-03-09 RX ADMIN — SODIUM CHLORIDE, SODIUM LACTATE, POTASSIUM CHLORIDE, AND CALCIUM CHLORIDE: 600; 310; 30; 20 INJECTION, SOLUTION INTRAVENOUS at 07:55

## 2023-03-09 ASSESSMENT — PAIN SCALES - GENERAL
PAINLEVEL_OUTOF10: 3
PAINLEVEL_OUTOF10: 7
PAINLEVEL_OUTOF10: 7
PAINLEVEL_OUTOF10: 8
PAINLEVEL_OUTOF10: 4
PAINLEVEL_OUTOF10: 4

## 2023-03-09 ASSESSMENT — PAIN DESCRIPTION - ORIENTATION
ORIENTATION: LEFT

## 2023-03-09 ASSESSMENT — PAIN DESCRIPTION - DESCRIPTORS
DESCRIPTORS: ACHING
DESCRIPTORS: ACHING
DESCRIPTORS: DISCOMFORT
DESCRIPTORS: ACHING
DESCRIPTORS: ACHING

## 2023-03-09 ASSESSMENT — LIFESTYLE VARIABLES: SMOKING_STATUS: 0

## 2023-03-09 ASSESSMENT — PAIN DESCRIPTION - LOCATION
LOCATION: KNEE

## 2023-03-09 NOTE — H&P
[facility] ([facility]) Pre-Operative History and Physical    Patient Name: Erin García  : 1960        Chief Complaint: Bilateral knee pain, left more than right  History of Present Illness: This patient has had ongoing pain for several months that has been unresponsive to conservative care which has included injection, therapy, activity modification and presents now for surgery. She has had both hips replaced and states they are doing well. Past Medical History:       Diagnosis Date    Arthritis     Colon polyps     Diabetes (HCC)     High cholesterol     Hip pain     HPV (human papilloma virus) anogenital infection 2017    Hypertension     Left bundle branch block      Past Surgical History:       Procedure Laterality Date    BLADDER REPAIR      Dr. Boyd Grace, got her bladder lifted    COLONOSCOPY  2014    Dr. Caitlin Farah    COLONOSCOPY  2018    Dr. Monet Chawla, VAGINAL      ovaries remain, done with her bladder repair    LASIK      TOTAL HIP ARTHROPLASTY Left 10/13/2022    LEFT HIP TOTAL ARTHROPLASTY performed by Karri Altamirano MD at [address] [place] [address]         Medications:   Prior to Admission medications    Medication Sig Start Date End Date Taking?  Authorizing Provider   lisinopril-hydroCHLOROthiazide (PRINZIDE;ZESTORETIC) 20-12.5 MG per tablet Take 1 tablet by mouth daily    Historical Provider, MD   vitamin D (ERGOCALCIFEROL) 1.25 MG (25492 UT) CAPS capsule Take 50,000 Units by mouth once a week    Historical Provider, MD   aspirin EC 81 MG EC tablet Take 1 tablet by mouth 2 times daily 10/13/22   Karri Altamirano MD   rosuvastatin (CRESTOR) 5 MG tablet Take 5 mg by mouth daily Indications: Changes in Cholesterol    Historical Provider, MD   Cholecalciferol (VITAMIN D3) 1.25 MG (39074 UT) CAPS Take 1 capsule by mouth Twice a Week Tuesday and Saturday    Historical Provider, MD   Coenzyme Q10 (COQ10) 100 MG CAPS Take 1 capsule by mouth Twice a Week Tuesday and Saturday    Historical Provider, MD   vitamin D 25 MCG (1000 UT) CAPS Take 1 capsule by mouth daily    Historical Provider, MD   ezetimibe (ZETIA) 10 MG tablet Take 10 mg by mouth daily 8/5/21   Historical Provider, MD   furosemide (LASIX) 20 MG tablet Take 20 mg by mouth daily 6/7/21   Historical Provider, MD   Semaglutide 14 MG TABS Take 1 tablet by mouth daily 7/12/21   Historical Provider, MD   pioglitazone (ACTOS) 30 MG tablet Take 15 mg by mouth daily    Historical Provider, MD   meloxicam (MOBIC) 15 MG tablet Take 15 mg by mouth daily 6/9/18   Historical Provider, MD   metFORMIN (GLUCOPHAGE) 500 MG tablet Take 1,000 mg by mouth 2 times daily (with meals)    Historical Provider, MD       Allergies:  Patient has no known allergies. Social History:   Tobacco:  reports that she has never smoked. She has never used smokeless tobacco.   Alcohol:  reports no history of alcohol use. Review of Systems:  General: Denies any fever or chills  EYES: Denies any diplopia  ENT: Tinnitus or vertigo  Resp: Denies any shortness of breath, cough or wheezing  Cardiac: Denies any chest pain, palpitations, claudication or edema  GI: Denies any melena, hematochezia, hematemesis or pyrosis  : Denies any frequency, urgency, hesitancy or incontinence  Musculoskeletal: Denies back pain, joint pain, myalgias  Heme: Denies bruising or bleeding easily  Endocrine: Denies any history of diabetes or thyroid disease  Psych: Denies anxiety or depression  Neuro: Denies any focal motor or sensory deficits      Physical Exam:  Vitals: LMP  (LMP Unknown)   CONSTITUTIONAL: Alert, appropriate, no acute distress.   PSYCH: mood and affect are normal with a normal rate and tone of speech  EYES: Non icteric, EOM intact, pupils equal round and reactive to light  ENT: Mucus membranes moist, no oral pharyngeal lesions, nares patent   NECK: Supple, no masses, no JVD, trachea mid line   CHEST/LUNGS: CTA bilaterally, normal respiratory effort   CARDIOVASCULAR: RRR, no murmurs,  2+ DP and radial pulses bilaterally  ABDOMEN: soft, nontender  EXTREMITIES: warm, well perfused, no edema. Left knee joint with mildly reduced range of motion and generalized tenderness. Neurovascular exam normal  SKIN: warm, dry with no rashes or lesions  LYMPH: No cervical or inguinal lymphadenopathy    RADIOLOGY: xrays of left knee show severe knee arthritis  LABORATORY:    CBC :    Lab Results   Component Value Date/Time    WBC 5.2 02/16/2023 02:55 PM    HGB 11.3 02/16/2023 02:55 PM    HCT 34.3 02/16/2023 02:55 PM     02/16/2023 02:55 PM     BMP:   Lab Results   Component Value Date/Time     02/16/2023 02:55 PM    K 3.9 02/16/2023 02:55 PM     02/16/2023 02:55 PM    CO2 28 02/16/2023 02:55 PM    BUN 17 02/16/2023 02:55 PM    LABALBU 4.4 02/24/2015 10:25 AM    CREATININE 0.8 02/16/2023 02:55 PM    CALCIUM 9.8 02/16/2023 02:55 PM    GFRAA >59 10/06/2022 09:10 AM    LABGLOM >60 02/16/2023 02:55 PM    GLUCOSE 122 02/16/2023 02:55 PM     PT/INR:    Lab Results   Component Value Date/Time    PROTIME 12.1 02/16/2023 02:55 PM    INR 0.91 02/16/2023 02:55 PM     U/A:   Lab Results   Component Value Date/Time    BACTERIA 2+ 02/24/2015 10:25 AM     HgBA1c:  No components found for: HGBA1C    IMPRESSION:   Left primary knee osteoarthritis. Most recent office note reviewed and there has been no change in health status. PLAN: Left knee replacement. I explained to the patient/family the patient's diagnosis and operative procedure in detail. They said they understood basically what was wrong and how I planned to fix it. They understand the expected recovery and the risks which include excessive bleeding, infection, reaction to anesthesia, nerve injury, stiffness, fracture, deformity and dislocation. They then signed an operative consent form. Provider:  Mona Ott MD  Date: 3/9/2023

## 2023-03-09 NOTE — PROGRESS NOTES
Patient discharged home today with outpatient therapy script. Went over all discharge instructions and new medications with patient's friend and provided a copy of all new medications to take home. Patient's friend verbalized understanding. Patient's stability will be assessed by PT and Op Care RN before discharge home.    Electronically signed by Wallace Loaiza RN on 3/9/2023 at 12:25 PM

## 2023-03-09 NOTE — OP NOTE
TOTAL KNEE ARTHROPLASTY OPERATIVE NOTE    NAME OF SURGEON / : Dipak Collins MD  PATIENT:   Patricia Crow  Date: 3/9/2023        Time: 11:23 AM   Referring Physician: ________________________    PREOP DIAGNOSIS:  left Knee  Primary osteoarthritis   POSTOP DIAGNOSIS:  Same     PROCEDURE:    Left  Cemented Posterior Stabilized Knee arthroplasty, INDIO robot assisted    IMPLANTS:   Implant Name Type Inv. Item Serial No.  Lot No. LRB No. Used Action   CEMENT BONE 40GM HI VISC PALACOS R - [id number]  CEMENT BONE 40GM HI VISC PALACOS R  [facility] [id number] Left 1 Implanted   CEMENT BONE 40GM HI VISC PALACOS R - [id number]  CEMENT BONE 40GM HI VISC PALACOS R  [facility] [id number] Left 1 Implanted   EXTENSION STEM L30MM [id number] KNEE TAPR LOU PERSONA - [id number]  EXTENSION STEM L30MM [id number] KNEE TAPR LOU PERSONA  [employer]- [id number] Left 1 Implanted   COMPONENT PAT [id number] THK9MM KNEE POLY LOU CONVENTIONAL - [id number]  COMPONENT PAT [id number] THK9MM KNEE POLY LOU CONVENTIONAL  [employer]- [id number] Left 1 Implanted   COMPONENT FEM SZ 7 STD L KNEE CO CHROM LOU POST STBL COR - [id number]  COMPONENT FEM SZ 7 STD L KNEE CO CHROM LOU POST STBL COR  [employer]- [id number] Left 1 Implanted   EXTENSION STEM SZ E 5DEG L TIBL KNEE LOU SERENITY TIB PERSONA - [id number]  EXTENSION STEM SZ E 5DEG L TIBL KNEE LOU SERENITY TIB PERSONA  [employer]- [id number] Left 1 Implanted   COMPONENT ARTC SURF CONSTRN POST STBL 6-9 EF 12 MM LT KNEE - [id number]  COMPONENT ARTC SURF CONSTRN POST STBL 6-9 EF 12 MM LT KNEE  [employer]- [id number] Left 1 Implanted       FINDINGS:  Preop ROM:  Full except for   - 5 degrees  extension  Alignment:    varus  Bone quality:  very soft  Cartilage wear:  Medial - severe  Lateral - mild  Pat-fem -moderate  ACL -Intact    ASSISTANT: Payal Alston, certified first assistant.   Helped with draping, exposure, retraction, essential steps of the procedure, and with wound closure. ANESTHESIA:  General  EBL:  500 mL  TOURNIQUET:  one hour  FLUIDS: See anesthesia record  BLOOD PRODUCTS:  None  COMPLICATIONS:  None  SPECIMEN:  None            INDICATIONS:  Patient presents for the above procedure having failed conservative treatment. Patient consents to the procedure above understanding the risks of bleeding, infection, anesthesia, nerve injury, stiffness, and blood clots. PROCEDURE:  The patient was brought to the operating room and placed in a supine position on the operating table. Anesthesia as specified above was placed. An antiobiotic was given IV. The unoperated extremities were well padded. A tourniquet was placed around the proximal thigh. The lower extremity was prepped with chlorhexidene/alcohol and draped sterilely using ioband barriers. A time out was performed. An anterior knee incision was made and fasciocutaneous flaps were developed. A mini midvastus approach was made and the patella subluxed. The prepatellar fat pad, ACL, and the anterior horns of the menisci were excised. A threaded Steinmann pin was placed 2 fingerbreadths proximal to the proximal aspect of the femoral articular cartilage along the anteromedial aspect of the distal femur. Pin was aimed at the eyes of the [facility]. The sensor array was placed over the pin and used as a pin guide for a second pin was placed a centimeter proximal to the first pin and parallel to it. The array was then screwed tightly to the pins. Same procedure was performed over the proximal medial tibia with the most proximal pin being 3 fingerbreadths distal to the tibial articular surface. The femoral hip center rotation was then registered. The distal femoral and proximal tibial anatomy and the epicondylar axis and the malleolar axes were identified with the sensor pin.   The [facility] robot was then set up to make our distal femoral and proximal tibial cuts. The preop plan consisted of a a 1 degree external rotation relative to the AP axis of the distal femur  and  0 degrees to the mechanical axis of the lower extremity. Depth of the femoral cut was set at 10 mm off the intact side. The tibial cut was set for 0 degrees in the coronal plane relative to the mechanical axis and 7 degrees posterior slope. The depth of resection was set at 10 mm off the intact side. The knee was placed in extension for patellar preparation. The maximum patella thickness was 22 mm. The patella as resected with an oscillating saw and consistent thickness verified with caliper. The trial patella was placed and the overall thickness was restored to 23 mm. The knee was flexed up and the patella was subluxed. Retractors were placed around the distal femur. The robot arm was brought in to do the distal femoral cut. A trial femoral component was placed over the distal femur to help determine sizing. The robot arm was then used to drill pins for the 4-in-1 block. Once appropriate external rotation was confirmed the drill holes were made for the 4-in-1 block. The robot arm with the tibial cutting guide was then brought into position and the pin holes drilled. The robot was then moved to the head of the table. Retractors were placed around the proximal tibia. The pins for the tibial cutting guide were then lightly impacted into  tibial drill holes. The tibial cutting guide from the manual instruments tray was then placed over the pins. The tibial cut was made. The tibial fragment and osteophytes were removed. Posterior meniscal horns were resected. The gap gauge was then used to verify that the knee would go to full extension and there was appropriate alignment of the tibial cut. The knee was flexed and retractors placed around the distal femur.   The 4-in-1 femoral guide was then impacted on the distal femur and a drill used to verify we would not notch anteriorly. The guide was then fixed with 2 screws and the femoral cuts made. Posterior femoral osteophytes were removed with a 3/4 inch curved osteotome and rongeur. The gap gauge was used to verify appropriate flexion gap tension. The knee was  flexed up and retractors placed around the proximal tibia. The appropriate sized tibial tray was then pinned to the proximal tibia in appropriate rotation without overhang. The femoral trial was impacted onto the femur. The box cut was made first with a recip saw and finished with regular saw. The insert for the box was placed. A 10 mm thick, posterior stabilized tibial liner was snapped in place and ROM and stability were checked. The knee had full ROM and symmetric varus/valgus stability in flexion and extension after    *release of  NO RELEASES  * resection of 2 mm more bone from the proximal tibia   *the appropriate size tibial liner was placed (see above)    Note: Stability to varus/valgus stress(mm):  Extension ( 3  ,  1 ) Mid Flexion (  2  ,  1  ) Flexion (  2  ,  1  )  *A CPS poly wasused to improve stability       The femoral lug holes drilled and the trials were removed. The sensor arrays and Steinmann pins were removed. The tibia was reamed and punched to accept the stem/keel. The tibial trial was removed. The surfaces were rinsed with pulse lavage and dried. Two batches of cement  were mixed. Appropriate sized implants were cemented in place, a trial tibial liner placed, and the knee held in full extension until cement hardened. The capsule was injected with Ropivacaine. Excess cement was removed, and the actual tibial liner was snapped in place. No thumbs patella tracking was checked. No release was needed. Hemostasis was achieved with electrocautery. The wound was copiously irrigated with antibiotic irrigation. The capsule was closed with interrupted #2 vicryl. Subcutaneous tissue was closed with running 2-0 vicryl.   Skin was closed with 3-0 vicryl and Prineo. A sterile dressing was applied. The patient was awakened, extubated and transferred to the recovery room in stable condition.             PLAN:  Full weight bearing, ROM, dvt prophylaxis        Electronically signed by Jazmyne Avila MD on 3/9/2023 at 11:23 AM

## 2023-03-09 NOTE — DISCHARGE INSTR - DIET
Good nutrition is important when healing from an illness, injury, or surgery. Follow any nutrition recommendations given to you during your hospital stay. If you were given an oral nutrition supplement while in the hospital, continue to take this supplement at home. You can take it with meals, in-between meals, and/or before bedtime. These supplements can be purchased at most local grocery stores, pharmacies, and chain [employer]-stores. If you have any questions about your diet or nutrition, call the hospital and ask for the dietitian.             Resume home diet

## 2023-03-09 NOTE — PROGRESS NOTES
CLINICAL PHARMACY NOTE: MEDS TO BEDS    Total # of Prescriptions Filled: 3   The following medications were delivered to the patient:  Aspirin 81mg  Cephalexin 500mg  Oxycodone 5mg    Additional Documentation:   The patients family member paid with cash and was handed the medication in the waiting room of [facility].

## 2023-03-09 NOTE — PROGRESS NOTES
Physical The  Physical Therapy     Facility/Department: [facility] OR  Initial Assessment  PHYSICAL THERAPY EVALUATION      Earney Letters    : 1960  MRN: [id number]   PHYSICIAN:  Amy Stallworth,*  Primary Problem    Patient Active Problem List   Diagnosis    Elevated LFTs    Primary hypertension    Pure hypercholesterolemia    Gastroesophageal reflux disease without esophagitis    Drug-induced constipation    Type 2 diabetes mellitus with hyperglycemia, without long-term current use of insulin ([facility])       Rehabilitation Diagnosis:     Primary osteoarthritis of left knee [M17.12]   L TKR.  L THR     SERVICE DATE: 3/9/2023        SUBJECTIVE: Patient states that they are planning on discharging home today    Pain Screening  Patient Currently in Pain: No    PRIOR LEVEL OF FUNCTION:    [x] Independent in community no assistive device     [] Independent in community with assistive device     [] Independent in the house with assistive device     [] Transfer only    []     OBJECTIVE:  Orientation: Within functional Limits      ROM - Passive, Non-operative side     [] Left lower extremity  [x] Right lower extremity       Within functional limits    ROM - Passive, operative side    [x] Left lower extremity  [] Right lower extremity      Hip - extension to 0 degrees and flexion to 80 in sitting    Knee - extension to 0 degrees in supine and flexion to 80 in sitting        STRENGTH - Non-operative side    [] Left lower extremity  [x] Right lower extremity       Within functional limits    STRENGTH - operative side    [x] Left lower extremity  [] Right lower extremity    Grossly  3-/5        TRANSFERS   Sit to stand     [x] CGA [] Minimum        Bed to chair     [x] CGA [] Minimum    Bed mobility   Supine to sit      [x] CGA [] Minimum      Scoot  [] Side to side  [] Up and down     [x] CGA [] Minimum    AMBULATION   Weight bearing: - WBAT      Distance:     Device: Rolling Walker - The patient has been trained on the use of this equipment     Assistance:       [x] CGA [] Modified Independent [] Stand by / Supervision [] Minimum         BALANCE   Sitting    Good    Standing    Good     ASSESSMENT   Activity limitations: Decreased functional mobility   Patient will benefit from continuing skilled physical therapy to improve mobility    Activity Tolerance  Activity Tolerance: Patient Tolerated treatment well     PLAN        Physical therapy to see 7 X/ week for 2 weeks then reassess. Plan of care to include:   Current Treatment Recommendations: Functional Mobility Training, Transfer Training  Gait Training, Safety Education & Training, Patient/Caregiver Education & Training    PT Education     Precautions; Transfer Training; General Safety; Family Education; Equipment; Weight-bearing Education; Gait Training; Functional Mobility Training  Performance of steps, use of gt belt.     GOALS    Short term goals  Time Frame for Short term goals: 2 weeks  Short term goal 1: Independent with bed mobility and transfers  Short term goal 2: Ambulate 100 feet independently  Short term goal 3: Verbalize understanding of ascending and descending steps    Discharge Recommendations:  Home with assist PRN, Patient would benefit from continued therapy after discharge     Comments:  Patient seen in same day surgery and nursing is actively managing pain  Patient instructed to request assistance before getting up  Nursing updated      Electronically signed by Brooke Fortune PT on 3/9/2023 at 2:55 PM

## 2023-03-09 NOTE — ANESTHESIA POSTPROCEDURE EVALUATION
Department of Anesthesiology  Postprocedure Note    Patient: Sabrina Rodgers  MRN: [id number]  YOB: 1960  Date of evaluation: 3/9/2023      Procedure Summary     Date: 03/09/23 Room / Location: [address]    Anesthesia Start: 0959 Anesthesia Stop: 1149    Procedure: LEFT ROBOTIC KNEE TOTAL ARTHROPLASTY (Left: Knee) Diagnosis:       Primary osteoarthritis of left knee      (Primary osteoarthritis of left knee [M17.12])    Surgeons: Didier Larry MD Responsible Provider: NEHEMIAS Nolan CRNA    Anesthesia Type: general ASA Status: 2          Anesthesia Type: No value filed.    Maira Phase I: Maira Score: 10    Maira Phase II:        Anesthesia Post Evaluation    Patient location during evaluation: PACU  Patient participation: complete - patient participated  Level of consciousness: sleepy but conscious  Pain score: 0  Airway patency: patent  Nausea & Vomiting: no nausea and no vomiting  Complications: no  Cardiovascular status: blood pressure returned to baseline  Respiratory status: acceptable and room air  Hydration status: euvolemic

## 2023-03-09 NOTE — CARE COORDINATION
Maryuri Murry RN Ortho Navigator  [phone]     Patient would like dme items to be delivered to [address] [place] [address] [address]. Patient is scheduled to discharge home today at 10:30 am.  Please call if you have any questions. Electronically signed by Katie Oquendo RN on 3/9/2023 at 8:40 AM  Yohan Adam  3/9/2023  7:35 AM   Admission  Description: 58year old female Department: [facility] OR     Patient Demographics    Name Patient ID SSN Gender Identity Birth Date   Cintia Munoz [id number]  Female [birth year] (58 yrs)     Address Phone Email     [address] [facility]   [address] [phone] (N)   [phone] (H) [email or web address]       Reg Status PCP Date Last Verified Next Review Date    Verified Lawrence Flood MD  [phone] 02/16/23 03/18/23      Insurance Payors as of 3/9/2023    [facility]    Plan: [address] [facility] Group: [id number] Member: [id number]   Effective from: 1/1/2015 Subscriber: Cheo Abdul Subscriber ID: [id number]   Guarantor: Nori Magallon     Patient Contacts    Name Relation Home Work [address] [place] [address] [phone]  Argelia Fee Unknown [phone]     Electronically signed by Katie Oquendo RN on 3/9/2023 at 8:44 AM

## 2023-03-09 NOTE — ANESTHESIA PRE PROCEDURE
Department of Anesthesiology  Preprocedure Note       Name:  Isabela Host   Age:  58 y.o.  :  1960                                          MRN:  [id number]         Date:  3/9/2023      Surgeon: Jessie Mohs):  Emily Fuentes MD    Procedure: Procedure(s):  LEFT ROBOTIC KNEE TOTAL ARTHROPLASTY    Medications prior to admission:   Prior to Admission medications    Medication Sig Start Date End Date Taking?  Authorizing Provider   lisinopril-hydroCHLOROthiazide (PRINZIDE;ZESTORETIC) 20-12.5 MG per tablet Take 1 tablet by mouth daily    Historical Provider, MD   rosuvastatin (CRESTOR) 5 MG tablet Take 5 mg by mouth daily Indications: Changes in Cholesterol    Historical Provider, MD   Cholecalciferol (VITAMIN D3) 1.25 MG (90390 UT) CAPS Take 1 capsule by mouth Twice a Week Tuesday and Saturday    Historical Provider, MD   Coenzyme Q10 (COQ10) 100 MG CAPS Take 1 capsule by mouth Twice a Week Tuesday and Saturday    Historical Provider, MD   vitamin D 25 MCG (1000 UT) CAPS Take 1 capsule by mouth daily    Historical Provider, MD   ezetimibe (ZETIA) 10 MG tablet Take 10 mg by mouth daily 21   Historical Provider, MD   furosemide (LASIX) 20 MG tablet Take 20 mg by mouth daily 21   Historical Provider, MD   Semaglutide 14 MG TABS Take 1 tablet by mouth daily 21   Historical Provider, MD   pioglitazone (ACTOS) 30 MG tablet Take 15 mg by mouth daily    Historical Provider, MD   meloxicam (MOBIC) 15 MG tablet Take 15 mg by mouth daily 18   Historical Provider, MD   metFORMIN (GLUCOPHAGE) 500 MG tablet Take 1,000 mg by mouth 2 times daily (with meals)    Historical Provider, MD       Current medications:    Current Facility-Administered Medications   Medication Dose Route Frequency Provider Last Rate Last Admin    scopolamine (TRANSDERM-SCOP) transdermal patch 1 patch  1 patch TransDERmal Once Emily Fuentes MD   1 patch at 23 0759    dexamethasone (PF) (DECADRON) injection 8 mg  8 mg IntraVENous Once Sarah England MD        lactated ringers IV soln infusion   IntraVENous Continuous Sarah England  mL/hr at 03/09/23 0755 New Bag at 03/09/23 0755    sodium chloride flush 0.9 % injection 5-40 mL  5-40 mL IntraVENous 2 times per day Sarah England MD        sodium chloride flush 0.9 % injection 5-40 mL  5-40 mL IntraVENous PRN Sarah England MD        0.9 % sodium chloride infusion   IntraVENous PRN Sarah England MD        ceFAZolin (ANCEF) 2,000 mg in sterile water 20 mL IV syringe  2,000 mg IntraVENous On Call to [address], [place]           Allergies:  No Known Allergies    Problem List:    Patient Active Problem List   Diagnosis Code    Elevated LFTs R79.89    Primary hypertension I10    Pure hypercholesterolemia E78.00    Gastroesophageal reflux disease without esophagitis K21.9    Drug-induced constipation K59.03    Type 2 diabetes mellitus with hyperglycemia, without long-term current use of insulin ([facility]) E11.65       Past Medical History:        Diagnosis Date    Arthritis     Colon polyps     Diabetes ([facility] [address]     High cholesterol     Hip pain     HPV (human papilloma virus) anogenital infection 06/23/2017    Hypertension     Left bundle branch block        Past Surgical History:        Procedure Laterality Date    BLADDER REPAIR  2017    Dr. Janay Colindres, got her bladder lifted    COLONOSCOPY  03/2014    Dr. Chapin Adkins COLONOSCOPY  04/2018    Dr. Moriah Reina, VAGINAL      ovaries remain, done with her bladder repair    LASIK      TOTAL HIP ARTHROPLASTY Left 10/13/2022    LEFT HIP TOTAL ARTHROPLASTY performed by Sarah England MD at [address] [place] [address]         Social History:    Social History     Tobacco Use    Smoking status: Never    Smokeless tobacco: Never   Substance Use Topics    Alcohol use:  No                                Counseling given: Not Answered      Vital Signs (Current):   Vitals:    03/09/23 0752   BP: (!) 148/78   Pulse: 85   Resp: 18   Temp: 97.6 °F (36.4 °C)   TempSrc: Temporal   SpO2: 98%   Weight: 172 lb (78 kg)   Height: 5' 4\" (1.626 m)                                              BP Readings from Last 3 Encounters:   03/09/23 (!) 148/78   10/13/22 (!) 146/72   08/23/21 135/78       NPO Status: Time of last liquid consumption: 0000                        Time of last solid consumption: 0000                        Date of last liquid consumption: 03/08/23                        Date of last solid food consumption: 03/08/23    BMI:   Wt Readings from Last 3 Encounters:   03/09/23 172 lb (78 kg)   02/16/23 170 lb (77.1 kg)   10/13/22 170 lb (77.1 kg)     Body mass index is 29.52 kg/m².     CBC:   Lab Results   Component Value Date/Time    WBC 5.2 02/16/2023 02:55 PM    RBC 3.78 02/16/2023 02:55 PM    HGB 11.3 02/16/2023 02:55 PM    HCT 34.3 02/16/2023 02:55 PM    MCV 90.7 02/16/2023 02:55 PM    RDW 12.9 02/16/2023 02:55 PM     02/16/2023 02:55 PM       CMP:   Lab Results   Component Value Date/Time     02/16/2023 02:55 PM    K 3.9 02/16/2023 02:55 PM     02/16/2023 02:55 PM    CO2 28 02/16/2023 02:55 PM    BUN 17 02/16/2023 02:55 PM    CREATININE 0.8 02/16/2023 02:55 PM    GFRAA >59 10/06/2022 09:10 AM    LABGLOM >60 02/16/2023 02:55 PM    GLUCOSE 122 02/16/2023 02:55 PM    PROT 8.0 02/24/2015 10:25 AM    CALCIUM 9.8 02/16/2023 02:55 PM    ALKPHOS 73 02/24/2015 10:25 AM    AST 15 02/24/2015 10:25 AM    ALT 29 02/24/2015 10:25 AM       POC Tests:   Recent Labs     03/09/23  0757   POCGLU 157*       Coags:   Lab Results   Component Value Date/Time    PROTIME 12.1 02/16/2023 02:55 PM    INR 0.91 02/16/2023 02:55 PM    APTT 29.4 02/16/2023 02:55 PM       HCG (If Applicable): No results found for: PREGTESTUR, PREGSERUM, HCG, HCGQUANT     ABGs: No results found for: PHART, PO2ART, XWY8HLK, YAX5RMY, BEART, X0RUWRYN     Type & Screen (If Applicable):  No results found for: LABABO, LABRH    Drug/Infectious Status (If Applicable):  No results found for: HIV, HEPCAB    COVID-19 Screening (If Applicable): No results found for: COVID19        Anesthesia Evaluation  Patient summary reviewed no history of anesthetic complications:   Airway: Mallampati: I  TM distance: >3 FB   Neck ROM: full  Mouth opening: > = 3 FB   Dental: normal exam         Pulmonary:normal exam  breath sounds clear to auscultation  (+) sleep apnea: on noncompliant,      (-) asthma, recent URI and not a current smoker          Patient did not smoke on day of surgery. Cardiovascular:  Exercise tolerance: good (>4 METS),   (+) hypertension:,     (-) pacemaker, past MI, CABG/stent and  angina    ECG reviewed  Rhythm: regular  Rate: normal           Beta Blocker:  Not on Beta Blocker         Neuro/Psych:      (-) seizures, TIA and CVA           GI/Hepatic/Renal:   (+) GERD: well controlled,      (-) liver disease and no renal disease       Endo/Other:    (+) DiabetesType II DM, , .    (-) hypothyroidism, hyperthyroidism               Abdominal:             Vascular:     - DVT. Other Findings:           Anesthesia Plan      general     ASA 2     (Adductor canal block  Preop famotidine)  Induction: intravenous. MIPS: Postoperative opioids intended and Prophylactic antiemetics administered. Anesthetic plan and risks discussed with patient and sibling. Use of blood products discussed with patient and sibling whom consented to blood products.                      Jorge Turcios MD   3/9/2023

## 2023-03-09 NOTE — DISCHARGE INSTRUCTIONS
[facility] [place] [facility]  Dr. Matthew Morales      Total Knee and Uni Knee Replacement  Discharge Instructions     To prevent blood clots, you have been placed on the following medication:  Aspirin 81 mg twice a day for four weeks    Surgical Site Care:  Showering is permitted on post op day 2 - Saturday  No submersion in a bath, swimming pool, whirlpool, etc    Physical Therapy:  You were given a prescription for outpatient therapy.  Please schedule with [facility]  if convenient by calling 1 [phone]. Otherwise, schedule with a therapist closer to your home  Work on range of motion.    Goal for your first office visit is for you to fully straighten and bend your knee to at least a right angle   Don't walk for exercise (it will make you more sore)  Weight Bearing Status:  Full weight bearing with a walker     Pain Medications  You were given a prescription to fill at your pharmacy  Wean off pain medications as you deem appropriate as long as pain is under control  Take tylenol instead of the prescribed pain medicine as your pain improves    Cold packs                                                                                                               FEVER .5 or less        May be used as necessary                                                                    Take Tylenol x 2                                                                                                            Deep breath x 10   Please take a stool softener such as dulcolax or colace daily                                 Cough, cough, cough                                                                                                                        Recheck in 1 - 11/2                                                                                                                                            hours  Do not drive for three weeks  DO NOT SMOKE, VAPE OR CHEW!!!    Contact office if  Increased  redness, swelling, drainage of any kind, and/or severe pain at surgery site. As well as new onset fevers and or chills. These could signify an infection. Calf tenderness to touch as well as increased swelling or redness. This could signify a clot. Any rash appears, increased  or new onset nausea/vomiting occur. This may indicate a reaction to a medication. Phone # [phone] ext [phone]. Leave message for my assistant Sha Ashton. She will promptly return your call. If you have an absolute emergency, text me with your name and problem at [phone].  (Dr. Ginger Silverman)  Or contact Ortho Navigator at 1 [phone]. Qamar Brian)    Follow up with Surgeon at scheduled appointment time. Thanks for the opportunity to care for you!

## 2023-03-09 NOTE — ANESTHESIA PROCEDURE NOTES
Peripheral Block    Patient location during procedure: holding area  Reason for block: post-op pain management  Start time: 3/9/2023 9:21 AM  End time: 3/9/2023 9:21 AM  Staffing  Performed: anesthesiologist   Anesthesiologist: Adrianne Dozier MD  Preanesthetic Checklist  Completed: patient identified, IV checked, site marked, risks and benefits discussed, surgical/procedural consents, equipment checked, pre-op evaluation, timeout performed, anesthesia consent given, oxygen available, monitors applied/VS acknowledged, fire risk safety assessment completed and verbalized and blood product R/B/A discussed and consented  Peripheral Block   Patient position: supine  Prep: ChloraPrep  Provider prep: mask and sterile gloves  Patient monitoring: continuous pulse ox and frequent blood pressure checks  Block type: Femoral  Adductor canal  Laterality: left  Injection technique: single-shot  Guidance: ultrasound guided  Local infiltration: lidocaine  Local infiltration: lidocaine    Needle   Needle type: Quincke   Needle gauge: 20 G  Needle localization: ultrasound guidance  Needle length: 10 cm  Assessment   Injection assessment: negative aspiration for heme, no paresthesia on injection, local visualized surrounding nerve on ultrasound and no intravascular symptoms  Paresthesia pain: none  Slow fractionated injection: yes  Hemodynamics: stable  Real-time US image taken/store: yes  Outcomes: uncomplicated and patient tolerated procedure well    Medications Administered  ropivacaine (NAROPIN) injection 0.5% - Perineural   20 mL - 3/9/2023 9:21:00 AM

## 2023-03-15 ENCOUNTER — TELEPHONE (OUTPATIENT)
Dept: INPATIENT UNIT | Age: 63
End: 2023-03-15

## 2023-03-15 NOTE — TELEPHONE ENCOUNTER
Follow up phone call x 1 attempt. No one answered and no mailbox set up.   Electronically signed by Jose Silva RN on 3/15/2023 at 10:00 AM

## 2023-05-12 ENCOUNTER — HOSPITAL ENCOUNTER (OUTPATIENT)
Dept: PREADMISSION TESTING | Age: 63
Discharge: HOME | End: 2023-05-16

## 2023-05-12 VITALS — HEIGHT: 64 IN | WEIGHT: 170 LBS | BODY MASS INDEX: 29.02 KG/M2

## 2023-05-12 RX ORDER — ACETAMINOPHEN 500 MG
1000 TABLET ORAL EVERY 6 HOURS PRN
COMMUNITY

## 2023-05-12 RX ORDER — HYDROCODONE BITARTRATE AND ACETAMINOPHEN 7.5; 325 MG/1; MG/1
1 TABLET ORAL EVERY 6 HOURS PRN
COMMUNITY

## 2023-05-12 RX ORDER — IBUPROFEN 400 MG/1
400 TABLET ORAL EVERY 6 HOURS PRN
COMMUNITY

## 2023-05-23 ENCOUNTER — ANESTHESIA (OUTPATIENT)
Dept: OPERATING ROOM | Age: 63
End: 2023-05-23
Payer: COMMERCIAL

## 2023-05-23 ENCOUNTER — ANESTHESIA EVENT (OUTPATIENT)
Dept: OPERATING ROOM | Age: 63
End: 2023-05-23
Payer: COMMERCIAL

## 2023-05-23 ENCOUNTER — APPOINTMENT (OUTPATIENT)
Dept: GENERAL RADIOLOGY | Age: 63
End: 2023-05-23
Attending: ORTHOPAEDIC SURGERY
Payer: COMMERCIAL

## 2023-05-23 ENCOUNTER — HOSPITAL ENCOUNTER (OUTPATIENT)
Age: 63
Setting detail: OUTPATIENT SURGERY
Discharge: HOME | End: 2023-05-23
Attending: ORTHOPAEDIC SURGERY | Admitting: ORTHOPAEDIC SURGERY
Payer: COMMERCIAL

## 2023-05-23 VITALS
SYSTOLIC BLOOD PRESSURE: 117 MMHG | DIASTOLIC BLOOD PRESSURE: 91 MMHG | HEART RATE: 72 BPM | HEIGHT: 64 IN | TEMPERATURE: 97.3 F | RESPIRATION RATE: 20 BRPM | BODY MASS INDEX: 29.02 KG/M2 | WEIGHT: 170 LBS | OXYGEN SATURATION: 97 %

## 2023-05-23 DIAGNOSIS — M25.662 KNEE STIFF, LEFT: Primary | ICD-10-CM

## 2023-05-23 LAB
GLUCOSE BLD-MCNC: 175 MG/DL (ref 70–99)
PERFORMED ON: ABNORMAL

## 2023-05-23 PROCEDURE — 2709999900 HC NON-CHARGEABLE SUPPLY: Performed by: ORTHOPAEDIC SURGERY

## 2023-05-23 PROCEDURE — 6370000000 HC RX 637 (ALT 250 FOR IP): Performed by: ORTHOPAEDIC SURGERY

## 2023-05-23 PROCEDURE — 7100000000 HC PACU RECOVERY - FIRST 15 MIN: Performed by: ORTHOPAEDIC SURGERY

## 2023-05-23 PROCEDURE — 3700000001 HC ADD 15 MINUTES (ANESTHESIA): Performed by: ORTHOPAEDIC SURGERY

## 2023-05-23 PROCEDURE — 3700000000 HC ANESTHESIA ATTENDED CARE: Performed by: ORTHOPAEDIC SURGERY

## 2023-05-23 PROCEDURE — 73560 X-RAY EXAM OF KNEE 1 OR 2: CPT

## 2023-05-23 PROCEDURE — 6360000002 HC RX W HCPCS: Performed by: NURSE ANESTHETIST, CERTIFIED REGISTERED

## 2023-05-23 PROCEDURE — 7100000010 HC PHASE II RECOVERY - FIRST 15 MIN: Performed by: ORTHOPAEDIC SURGERY

## 2023-05-23 PROCEDURE — 3600000100 HC MANIPULATE KNEE UNDER ANESTH: Performed by: ORTHOPAEDIC SURGERY

## 2023-05-23 PROCEDURE — 2580000003 HC RX 258: Performed by: ORTHOPAEDIC SURGERY

## 2023-05-23 PROCEDURE — 82962 GLUCOSE BLOOD TEST: CPT

## 2023-05-23 PROCEDURE — 7100000001 HC PACU RECOVERY - ADDTL 15 MIN: Performed by: ORTHOPAEDIC SURGERY

## 2023-05-23 PROCEDURE — 7100000011 HC PHASE II RECOVERY - ADDTL 15 MIN: Performed by: ORTHOPAEDIC SURGERY

## 2023-05-23 PROCEDURE — 6360000002 HC RX W HCPCS: Performed by: ANESTHESIOLOGY

## 2023-05-23 PROCEDURE — 2500000003 HC RX 250 WO HCPCS: Performed by: ORTHOPAEDIC SURGERY

## 2023-05-23 PROCEDURE — 6360000002 HC RX W HCPCS: Performed by: ORTHOPAEDIC SURGERY

## 2023-05-23 RX ORDER — SODIUM CHLORIDE 0.9 % (FLUSH) 0.9 %
5-40 SYRINGE (ML) INJECTION EVERY 12 HOURS SCHEDULED
Status: DISCONTINUED | OUTPATIENT
Start: 2023-05-23 | End: 2023-05-23 | Stop reason: HOSPADM

## 2023-05-23 RX ORDER — SODIUM CHLORIDE, SODIUM LACTATE, POTASSIUM CHLORIDE, CALCIUM CHLORIDE 600; 310; 30; 20 MG/100ML; MG/100ML; MG/100ML; MG/100ML
INJECTION, SOLUTION INTRAVENOUS CONTINUOUS
Status: DISCONTINUED | OUTPATIENT
Start: 2023-05-23 | End: 2023-05-23 | Stop reason: HOSPADM

## 2023-05-23 RX ORDER — SODIUM CHLORIDE 0.9 % (FLUSH) 0.9 %
5-40 SYRINGE (ML) INJECTION PRN
Status: DISCONTINUED | OUTPATIENT
Start: 2023-05-23 | End: 2023-05-23 | Stop reason: HOSPADM

## 2023-05-23 RX ORDER — FENTANYL CITRATE 50 UG/ML
25 INJECTION, SOLUTION INTRAMUSCULAR; INTRAVENOUS EVERY 5 MIN PRN
Status: DISCONTINUED | OUTPATIENT
Start: 2023-05-23 | End: 2023-05-23 | Stop reason: HOSPADM

## 2023-05-23 RX ORDER — MIDAZOLAM HYDROCHLORIDE 2 MG/2ML
2 INJECTION, SOLUTION INTRAMUSCULAR; INTRAVENOUS
Status: DISCONTINUED | OUTPATIENT
Start: 2023-05-23 | End: 2023-05-23 | Stop reason: HOSPADM

## 2023-05-23 RX ORDER — CELECOXIB 200 MG/1
200 CAPSULE ORAL ONCE
Status: COMPLETED | OUTPATIENT
Start: 2023-05-23 | End: 2023-05-23

## 2023-05-23 RX ORDER — FENTANYL CITRATE 50 UG/ML
25 INJECTION, SOLUTION INTRAMUSCULAR; INTRAVENOUS
Status: DISCONTINUED | OUTPATIENT
Start: 2023-05-23 | End: 2023-05-23 | Stop reason: HOSPADM

## 2023-05-23 RX ORDER — ACETAMINOPHEN 500 MG
1000 TABLET ORAL ONCE
Status: COMPLETED | OUTPATIENT
Start: 2023-05-23 | End: 2023-05-23

## 2023-05-23 RX ORDER — SODIUM CHLORIDE 9 MG/ML
INJECTION, SOLUTION INTRAVENOUS PRN
Status: DISCONTINUED | OUTPATIENT
Start: 2023-05-23 | End: 2023-05-23 | Stop reason: HOSPADM

## 2023-05-23 RX ORDER — FENTANYL CITRATE 50 UG/ML
50 INJECTION, SOLUTION INTRAMUSCULAR; INTRAVENOUS EVERY 5 MIN PRN
Status: DISCONTINUED | OUTPATIENT
Start: 2023-05-23 | End: 2023-05-23 | Stop reason: HOSPADM

## 2023-05-23 RX ORDER — LIDOCAINE HYDROCHLORIDE 10 MG/ML
1 INJECTION, SOLUTION EPIDURAL; INFILTRATION; INTRACAUDAL; PERINEURAL
Status: DISCONTINUED | OUTPATIENT
Start: 2023-05-23 | End: 2023-05-23 | Stop reason: HOSPADM

## 2023-05-23 RX ORDER — FENTANYL CITRATE 50 UG/ML
50 INJECTION, SOLUTION INTRAMUSCULAR; INTRAVENOUS
Status: DISCONTINUED | OUTPATIENT
Start: 2023-05-23 | End: 2023-05-23 | Stop reason: HOSPADM

## 2023-05-23 RX ORDER — PROPOFOL 10 MG/ML
INJECTION, EMULSION INTRAVENOUS PRN
Status: DISCONTINUED | OUTPATIENT
Start: 2023-05-23 | End: 2023-05-23 | Stop reason: SDUPTHER

## 2023-05-23 RX ORDER — DEXAMETHASONE SODIUM PHOSPHATE 10 MG/ML
8 INJECTION, SOLUTION INTRAMUSCULAR; INTRAVENOUS ONCE
Status: DISCONTINUED | OUTPATIENT
Start: 2023-05-23 | End: 2023-05-23 | Stop reason: HOSPADM

## 2023-05-23 RX ORDER — DIPHENHYDRAMINE HYDROCHLORIDE 50 MG/ML
12.5 INJECTION INTRAMUSCULAR; INTRAVENOUS
Status: DISCONTINUED | OUTPATIENT
Start: 2023-05-23 | End: 2023-05-23 | Stop reason: HOSPADM

## 2023-05-23 RX ORDER — PROCHLORPERAZINE EDISYLATE 5 MG/ML
5 INJECTION INTRAMUSCULAR; INTRAVENOUS
Status: DISCONTINUED | OUTPATIENT
Start: 2023-05-23 | End: 2023-05-23 | Stop reason: HOSPADM

## 2023-05-23 RX ORDER — OXYCODONE HCL 10 MG/1
10 TABLET, FILM COATED, EXTENDED RELEASE ORAL ONCE
Status: COMPLETED | OUTPATIENT
Start: 2023-05-23 | End: 2023-05-23

## 2023-05-23 RX ORDER — BUPIVACAINE HYDROCHLORIDE 5 MG/ML
INJECTION, SOLUTION EPIDURAL; INTRACAUDAL PRN
Status: DISCONTINUED | OUTPATIENT
Start: 2023-05-23 | End: 2023-05-23 | Stop reason: ALTCHOICE

## 2023-05-23 RX ORDER — HYDROCODONE BITARTRATE AND ACETAMINOPHEN 7.5; 325 MG/1; MG/1
1 TABLET ORAL EVERY 4 HOURS PRN
Qty: 20 TABLET | Refills: 0 | Status: SHIPPED | OUTPATIENT
Start: 2023-05-23 | End: 2023-05-26

## 2023-05-23 RX ORDER — ONDANSETRON 2 MG/ML
4 INJECTION INTRAMUSCULAR; INTRAVENOUS
Status: DISCONTINUED | OUTPATIENT
Start: 2023-05-23 | End: 2023-05-23 | Stop reason: HOSPADM

## 2023-05-23 RX ORDER — BETAMETHASONE SODIUM PHOSPHATE AND BETAMETHASONE ACETATE 3; 3 MG/ML; MG/ML
INJECTION, SUSPENSION INTRA-ARTICULAR; INTRALESIONAL; INTRAMUSCULAR; SOFT TISSUE PRN
Status: DISCONTINUED | OUTPATIENT
Start: 2023-05-23 | End: 2023-05-23 | Stop reason: ALTCHOICE

## 2023-05-23 RX ORDER — SCOLOPAMINE TRANSDERMAL SYSTEM 1 MG/1
1 PATCH, EXTENDED RELEASE TRANSDERMAL ONCE
Status: DISCONTINUED | OUTPATIENT
Start: 2023-05-23 | End: 2023-05-23 | Stop reason: HOSPADM

## 2023-05-23 RX ADMIN — OXYCODONE HYDROCHLORIDE 10 MG: 10 TABLET, FILM COATED, EXTENDED RELEASE ORAL at 06:06

## 2023-05-23 RX ADMIN — PROPOFOL 75 MG: 10 INJECTION, EMULSION INTRAVENOUS at 07:11

## 2023-05-23 RX ADMIN — PROPOFOL 5 MG: 10 INJECTION, EMULSION INTRAVENOUS at 07:13

## 2023-05-23 RX ADMIN — FENTANYL CITRATE 50 MCG: 50 INJECTION, SOLUTION INTRAMUSCULAR; INTRAVENOUS at 07:43

## 2023-05-23 RX ADMIN — PROPOFOL 50 MG: 10 INJECTION, EMULSION INTRAVENOUS at 07:09

## 2023-05-23 RX ADMIN — CELECOXIB 200 MG: 200 CAPSULE ORAL at 06:06

## 2023-05-23 RX ADMIN — FENTANYL CITRATE 50 MCG: 50 INJECTION, SOLUTION INTRAMUSCULAR; INTRAVENOUS at 07:36

## 2023-05-23 RX ADMIN — SODIUM CHLORIDE, POTASSIUM CHLORIDE, SODIUM LACTATE AND CALCIUM CHLORIDE: 600; 310; 30; 20 INJECTION, SOLUTION INTRAVENOUS at 06:03

## 2023-05-23 RX ADMIN — PROPOFOL 50 MG: 10 INJECTION, EMULSION INTRAVENOUS at 07:15

## 2023-05-23 RX ADMIN — ACETAMINOPHEN 1000 MG: 500 TABLET, FILM COATED ORAL at 06:06

## 2023-05-23 ASSESSMENT — PAIN SCALES - GENERAL
PAINLEVEL_OUTOF10: 0
PAINLEVEL_OUTOF10: 7
PAINLEVEL_OUTOF10: 0
PAINLEVEL_OUTOF10: 7

## 2023-05-23 ASSESSMENT — PAIN DESCRIPTION - ORIENTATION
ORIENTATION: LEFT
ORIENTATION: LEFT

## 2023-05-23 ASSESSMENT — PAIN DESCRIPTION - DESCRIPTORS
DESCRIPTORS: BURNING
DESCRIPTORS: BURNING

## 2023-05-23 ASSESSMENT — LIFESTYLE VARIABLES: SMOKING_STATUS: 0

## 2023-05-23 ASSESSMENT — PAIN - FUNCTIONAL ASSESSMENT: PAIN_FUNCTIONAL_ASSESSMENT: 0-10

## 2023-05-23 ASSESSMENT — PAIN DESCRIPTION - LOCATION
LOCATION: KNEE
LOCATION: KNEE

## 2023-05-23 NOTE — OP NOTE
SURGICAL DEPARTMENT REPORT    NAME OF SURGEON/:  Silvina Aldana MD    DATE OF SERVICE: 5/23/2023     PREOPERATIVE DIAGNOSIS    Left knee stiffness after knee replacement  POSTOPERATIVE DIAGNOSIS:  Same    PROCEDURES  1. Left  knee manipulation  2. Corticosteroid injection. ASSISTANT  None. ANESTHESIA     Sedation      COMPLICATIONS    None. FINDINGS:  None    DETAILS OF PROCEDURE    The patient was brought to the recovery room and given IV sedation by the anesthetist.  The knee range of motion before manipulation was  -10 degrees of extension and 70 degrees of flexion. The joint was gently pressed first into extension and then into flexion with gradually increasing pressure and palpable lysis of adhesions. After manipulation the range of motion improved to  0 degrees of extension and 120 degrees of flexion. There was a louder than usual pop in both flexion and extension. Elected to go ahead and get x-rays of her knee postmanipulation. The superolateral knee area was prepped with chlorhexidine and alcohol . Ten milliliters of 1% lidocaine was injected using a 21 gauge spinal needle into the skin and then into through capsule. The joint was then injected with 4 mg of betamethasone and chased with 2 cc of lidocaine. The needle was withdrawn and a bandaid applied. Patient tolerated well. .     Portable AP and lateral x-rays of the left knee show no obvious fracture. Electronically signed by Doe Melvin MD on 5/23/2023 at 7:05 AM      PLAN  Range of motion. Therapy daily for two weeks. Follow up in the office in 2 weeks.

## 2023-05-23 NOTE — ANESTHESIA POSTPROCEDURE EVALUATION
Department of Anesthesiology  Postprocedure Note    Patient: James De La Garza  MRN: [id number]  [place]: 1960  Date of evaluation: 5/23/2023      Procedure Summary     Date: 05/23/23 Room / Location: PACU [address]    Anesthesia Start: 0700 Anesthesia Stop: 1330    Procedure: LEFT KNEE MANIPULATION (Left) Diagnosis:       Presence of left artificial knee joint      (Presence of left artificial knee joint [Z96.652])    Surgeons: Caden Johnson MD Responsible Provider: Pamula Bernheim, APRN - CRNA    Anesthesia Type: general, TIVA ASA Status: 2          Anesthesia Type: No value filed.     Maira Phase I: Maira Score: 5    Maira Phase II:        Anesthesia Post Evaluation    Patient location during evaluation: PACU  Patient participation: complete - patient participated  Level of consciousness: sleepy but conscious  Airway patency: patent  Nausea & Vomiting: no vomiting and no nausea  Complications: no  Cardiovascular status: hemodynamically stable  Respiratory status: acceptable, spontaneous ventilation, nasal cannula and nonlabored ventilation  Hydration status: stable  Comments: BP (!) 120/57   Pulse 80   Temp 97 °F (36.1 °C) (Temporal)   Resp 14   Ht 5' 4\" (1.626 m)   Wt 170 lb (77.1 kg)   LMP  (LMP Unknown)   SpO2 96%   BMI 29.18 kg/m²

## 2023-05-23 NOTE — H&P
operative procedure in detail. They said they understood basically what was wrong and how I planned to fix it. They understand the expected recovery and the risks which include excessive bleeding, infection, reaction to anesthesia, nerve injury, stiffness, fracture, deformity and dislocation. They then signed an operative consent form. Provider:  Kourtney Pascual MD  Date: 5/23/2023

## 2023-05-23 NOTE — DISCHARGE INSTRUCTIONS
Work hard on knee range of motion  Go to therapy daily  Dont lie with pillow under knee  Follow up in 2 weeks

## 2023-05-26 NOTE — PROGRESS NOTES
CLINICAL PHARMACY NOTE: MEDS TO BEDS    Total # of Prescriptions Filled: 1   The following medications were delivered to the patient:  Discharge Medication List as of 5/23/2023  8:16 AM        START taking these medications    Details   !! HYDROcodone-acetaminophen (NORCO) 7.5-325 MG per tablet Take 1 tablet by mouth every 4 hours as needed for Pain for up to 3 days. Max Daily Amount: 6 tablets, Disp-20 tablet, R-0Normal       !! - Potential duplicate medications found. Please discuss with provider. Additional Documentation:    Delivered RX's to patient bedside. Also dispensed free Narcan. Paid cash.

## 2023-08-02 ENCOUNTER — HOSPITAL ENCOUNTER (OUTPATIENT)
Dept: NUCLEAR MEDICINE | Age: 63
Discharge: HOME | End: 2023-08-04
Payer: COMMERCIAL

## 2023-08-02 DIAGNOSIS — R10.13 EPIGASTRIC PAIN: ICD-10-CM

## 2023-08-02 DIAGNOSIS — R53.82 CHRONIC FATIGUE, UNSPECIFIED: ICD-10-CM

## 2023-08-02 PROCEDURE — 3430000000 HC RX DIAGNOSTIC RADIOPHARMACEUTICAL: Performed by: NURSE PRACTITIONER

## 2023-08-02 PROCEDURE — A9537 TC99M MEBROFENIN: HCPCS | Performed by: NURSE PRACTITIONER

## 2023-08-02 PROCEDURE — 78226 HEPATOBILIARY SYSTEM IMAGING: CPT

## 2023-08-02 RX ADMIN — Medication 5 MILLICURIE: at 11:02

## 2023-09-14 ENCOUNTER — HOSPITAL ENCOUNTER (OUTPATIENT)
Dept: PREADMISSION TESTING | Age: 63
Discharge: HOME | End: 2023-09-18
Payer: COMMERCIAL

## 2023-09-14 VITALS — WEIGHT: 163.2 LBS | BODY MASS INDEX: 27.86 KG/M2 | HEIGHT: 64 IN

## 2023-09-14 LAB
ANION GAP SERPL CALCULATED.3IONS-SCNC: 10 MMOL/L (ref 7–19)
APTT PPP: 27.1 SEC (ref 26–36.2)
BASOPHILS # BLD: 0 K/UL (ref 0–0.2)
BASOPHILS NFR BLD: 0.7 % (ref 0–1)
BUN SERPL-MCNC: 24 MG/DL (ref 8–23)
CALCIUM SERPL-MCNC: 9.5 MG/DL (ref 8.8–10.2)
CHLORIDE SERPL-SCNC: 104 MMOL/L (ref 98–111)
CO2 SERPL-SCNC: 24 MMOL/L (ref 22–29)
CREAT SERPL-MCNC: 0.9 MG/DL (ref 0.5–0.9)
EKG P AXIS: 1 DEGREES
EKG P-R INTERVAL: 134 MS
EKG Q-T INTERVAL: 418 MS
EKG QRS DURATION: 128 MS
EKG QTC CALCULATION (BAZETT): 439 MS
EKG T AXIS: 49 DEGREES
EOSINOPHIL # BLD: 0.1 K/UL (ref 0–0.6)
EOSINOPHIL NFR BLD: 2 % (ref 0–5)
ERYTHROCYTE [DISTWIDTH] IN BLOOD BY AUTOMATED COUNT: 13.4 % (ref 11.5–14.5)
GLUCOSE SERPL-MCNC: 141 MG/DL (ref 74–109)
HBA1C MFR BLD: 7.1 % (ref 4–6)
HCT VFR BLD AUTO: 32.4 % (ref 37–47)
HGB BLD-MCNC: 10.6 G/DL (ref 12–16)
IMM GRANULOCYTES # BLD: 0 K/UL
INR PPP: 0.93 (ref 0.88–1.18)
LYMPHOCYTES # BLD: 1.4 K/UL (ref 1.1–4.5)
LYMPHOCYTES NFR BLD: 31.1 % (ref 20–40)
MCH RBC QN AUTO: 30.5 PG (ref 27–31)
MCHC RBC AUTO-ENTMCNC: 32.7 G/DL (ref 33–37)
MCV RBC AUTO: 93.4 FL (ref 81–99)
MONOCYTES # BLD: 0.4 K/UL (ref 0–0.9)
MONOCYTES NFR BLD: 8.3 % (ref 0–10)
MRSA DNA SPEC QL NAA+PROBE: NOT DETECTED
NEUTROPHILS # BLD: 2.6 K/UL (ref 1.5–7.5)
NEUTS SEG NFR BLD: 57.7 % (ref 50–65)
PLATELET # BLD AUTO: 228 K/UL (ref 130–400)
PMV BLD AUTO: 9.7 FL (ref 9.4–12.3)
POTASSIUM SERPL-SCNC: 4.6 MMOL/L (ref 3.5–5)
PROTHROMBIN TIME: 12.2 SEC (ref 12–14.6)
RBC # BLD AUTO: 3.47 M/UL (ref 4.2–5.4)
SODIUM SERPL-SCNC: 138 MMOL/L (ref 136–145)
WBC # BLD AUTO: 4.5 K/UL (ref 4.8–10.8)

## 2023-09-14 PROCEDURE — 83036 HEMOGLOBIN GLYCOSYLATED A1C: CPT

## 2023-09-14 PROCEDURE — 93010 ELECTROCARDIOGRAM REPORT: CPT | Performed by: INTERNAL MEDICINE

## 2023-09-14 PROCEDURE — 80048 BASIC METABOLIC PNL TOTAL CA: CPT

## 2023-09-14 PROCEDURE — 87641 MR-STAPH DNA AMP PROBE: CPT

## 2023-09-14 PROCEDURE — 85610 PROTHROMBIN TIME: CPT

## 2023-09-14 PROCEDURE — 85025 COMPLETE CBC W/AUTO DIFF WBC: CPT

## 2023-09-14 PROCEDURE — 93005 ELECTROCARDIOGRAM TRACING: CPT | Performed by: ORTHOPAEDIC SURGERY

## 2023-09-14 PROCEDURE — 85730 THROMBOPLASTIN TIME PARTIAL: CPT

## 2023-09-14 NOTE — DISCHARGE INSTRUCTIONS
The day before surgery you will receive a phone call from the surgery nurse to let you know what time to arrive on the day of surgery. This call will usually be between 2-4 PM. If you do not receive a phone call by 4 PM the day before your surgery please call [phone] and let them know you have not received an arrival time. If your surgery is on Monday, your call will be on the Friday before your Monday surgery. Carlos green the [facility]    A script for Bactroban ointment has been call to your pharmacy or was given to you in written form by your surgeon. The guidelines for the ointment use are as follows:    1)  Start using the ointment 7 days before your surgery date    2)  Use the ointment two times a day - morning and night    3)  Place the ointment on a Q-tip and swirl up in your nose making sure you cover completely       the skin just inside of each nostril. Use one end of the Q-tip for each nostril. Chlorhexidine Gluconate 4% Solution    Patient should shower with this soap a minimum of 3 consecutive showers (2 nights before surgery, the night before surgery and the morning of surgery) washing from the neck down (avoiding contact with genitalia). DO  [facility] [address] YOUR HAIR OR FACE WITH THIS SOAP. When washing with this soap, apply enough to suds up the body thoroughly, turn the water away from your body and allow the soap suds to remain on the body for 2 full minutes, then rinse body completely. After using this soap on the body, please do not apply powders or lotions to your body. After the shower the night before surgery, please dry off with a new towel, sleep in new freshly laundered pj's, and change your bed linen before going to sleep. MEDICATION INSTRUCTIONS PRIOR TO YOUR SURGERY    Night before surgery:      ________Do not take Metformin (you will not be eating or drinking after midnight)      The morning of surgery:     You can take all your usual prescribed medications with

## 2023-09-29 NOTE — DISCHARGE INSTR - DIET

## 2023-09-29 NOTE — DISCHARGE INSTRUCTIONS
[facility] [address] [facility]      Total Knee and Uni Knee Replacement  Discharge Instructions     To prevent blood clots, you have been placed on the following medication:  Aspirin 81 mg twice a day for four weeks    Surgical Site Care: Showering is permitted on post op day 2 - Thursday  No submersion in a bath, swimming pool, whirlpool, etc    Physical Therapy:  You were given a prescription for outpatient therapy. Please schedule with [facility]  if convenient by calling [phone]. Otherwise, schedule with a therapist closer to your home  Work on range of motion. Goal for your first office visit is for you to fully straighten and bend your knee to at least a right angle   Don't walk for exercise (it will make you more sore)  Weight Bearing Status:  Full weight bearing with a walker     Pain Medications  You were given a prescription to fill at your pharmacy  Wean off pain medications as you deem appropriate as long as pain is under control  Take tylenol instead of the prescribed pain medicine as your pain improves    Cold packs                                                                                                               FEVER .5 or less        May be used as necessary                                                                    Take Tylenol x 2                                                                                                            Deep breath x 10   Please take a stool softener such as dulcolax or colace daily                                 Cough, cough, cough                                                                                                                        Recheck in 1 - 11/2                                                                                                                                            hours  Do not drive for four weeks  DO NOT SMOKE, VAPE OR CHEW!!!     Contact office if  Increased

## 2023-10-03 ENCOUNTER — ANESTHESIA (OUTPATIENT)
Dept: OPERATING ROOM | Age: 63
End: 2023-10-03
Payer: COMMERCIAL

## 2023-10-03 ENCOUNTER — HOSPITAL ENCOUNTER (OUTPATIENT)
Age: 63
Setting detail: OUTPATIENT SURGERY
Discharge: HOME | End: 2023-10-03
Attending: ORTHOPAEDIC SURGERY | Admitting: ORTHOPAEDIC SURGERY
Payer: COMMERCIAL

## 2023-10-03 ENCOUNTER — ANESTHESIA EVENT (OUTPATIENT)
Dept: OPERATING ROOM | Age: 63
End: 2023-10-03
Payer: COMMERCIAL

## 2023-10-03 VITALS
HEIGHT: 64 IN | TEMPERATURE: 97.9 F | HEART RATE: 96 BPM | OXYGEN SATURATION: 96 % | SYSTOLIC BLOOD PRESSURE: 156 MMHG | RESPIRATION RATE: 16 BRPM | BODY MASS INDEX: 27.83 KG/M2 | DIASTOLIC BLOOD PRESSURE: 71 MMHG | WEIGHT: 163 LBS

## 2023-10-03 DIAGNOSIS — M17.11 PRIMARY OSTEOARTHRITIS OF RIGHT KNEE: Primary | ICD-10-CM

## 2023-10-03 LAB
GLUCOSE BLD-MCNC: 154 MG/DL (ref 70–99)
GLUCOSE BLD-MCNC: 159 MG/DL (ref 70–99)
PERFORMED ON: ABNORMAL
PERFORMED ON: ABNORMAL

## 2023-10-03 PROCEDURE — 7100000010 HC PHASE II RECOVERY - FIRST 15 MIN: Performed by: ORTHOPAEDIC SURGERY

## 2023-10-03 PROCEDURE — 2720000010 HC SURG SUPPLY STERILE: Performed by: ORTHOPAEDIC SURGERY

## 2023-10-03 PROCEDURE — 3600000015 HC SURGERY LEVEL 5 ADDTL 15MIN: Performed by: ORTHOPAEDIC SURGERY

## 2023-10-03 PROCEDURE — 2580000003 HC RX 258: Performed by: ORTHOPAEDIC SURGERY

## 2023-10-03 PROCEDURE — 3600000005 HC SURGERY LEVEL 5 BASE: Performed by: ORTHOPAEDIC SURGERY

## 2023-10-03 PROCEDURE — 2500000003 HC RX 250 WO HCPCS: Performed by: NURSE ANESTHETIST, CERTIFIED REGISTERED

## 2023-10-03 PROCEDURE — 7100000011 HC PHASE II RECOVERY - ADDTL 15 MIN: Performed by: ORTHOPAEDIC SURGERY

## 2023-10-03 PROCEDURE — 97161 PT EVAL LOW COMPLEX 20 MIN: CPT

## 2023-10-03 PROCEDURE — 2500000003 HC RX 250 WO HCPCS: Performed by: ORTHOPAEDIC SURGERY

## 2023-10-03 PROCEDURE — C1713 ANCHOR/SCREW BN/BN,TIS/BN: HCPCS | Performed by: ORTHOPAEDIC SURGERY

## 2023-10-03 PROCEDURE — 6360000002 HC RX W HCPCS: Performed by: NURSE ANESTHETIST, CERTIFIED REGISTERED

## 2023-10-03 PROCEDURE — 6370000000 HC RX 637 (ALT 250 FOR IP): Performed by: ORTHOPAEDIC SURGERY

## 2023-10-03 PROCEDURE — 82962 GLUCOSE BLOOD TEST: CPT

## 2023-10-03 PROCEDURE — A4217 STERILE WATER/SALINE, 500 ML: HCPCS | Performed by: ORTHOPAEDIC SURGERY

## 2023-10-03 PROCEDURE — 7100000001 HC PACU RECOVERY - ADDTL 15 MIN: Performed by: ORTHOPAEDIC SURGERY

## 2023-10-03 PROCEDURE — 7100000000 HC PACU RECOVERY - FIRST 15 MIN: Performed by: ORTHOPAEDIC SURGERY

## 2023-10-03 PROCEDURE — 3700000001 HC ADD 15 MINUTES (ANESTHESIA): Performed by: ORTHOPAEDIC SURGERY

## 2023-10-03 PROCEDURE — C1776 JOINT DEVICE (IMPLANTABLE): HCPCS | Performed by: ORTHOPAEDIC SURGERY

## 2023-10-03 PROCEDURE — 2709999900 HC NON-CHARGEABLE SUPPLY: Performed by: ORTHOPAEDIC SURGERY

## 2023-10-03 PROCEDURE — 6360000002 HC RX W HCPCS: Performed by: ORTHOPAEDIC SURGERY

## 2023-10-03 PROCEDURE — 3700000000 HC ANESTHESIA ATTENDED CARE: Performed by: ORTHOPAEDIC SURGERY

## 2023-10-03 PROCEDURE — 64447 NJX AA&/STRD FEMORAL NRV IMG: CPT | Performed by: NURSE ANESTHETIST, CERTIFIED REGISTERED

## 2023-10-03 PROCEDURE — 97530 THERAPEUTIC ACTIVITIES: CPT

## 2023-10-03 PROCEDURE — 6360000002 HC RX W HCPCS: Performed by: ANESTHESIOLOGY

## 2023-10-03 DEVICE — COMPONENT FEM SZ 6 STD R KNEE CO CHROM CEM POST STBL COR: Type: IMPLANTABLE DEVICE | Site: FEMUR | Status: FUNCTIONAL

## 2023-10-03 DEVICE — IMPLANTABLE DEVICE
Type: IMPLANTABLE DEVICE | Site: TIBIA | Status: FUNCTIONAL
Brand: PERSONA®

## 2023-10-03 DEVICE — PSN TIB STM 5 DEG SZ E R: Type: IMPLANTABLE DEVICE | Site: TIBIA | Status: FUNCTIONAL

## 2023-10-03 DEVICE — CEMENT BONE 40GM HI VISC PALACOS R: Type: IMPLANTABLE DEVICE | Site: KNEE | Status: FUNCTIONAL

## 2023-10-03 DEVICE — COMPONENT ARTC SURF PS 6-9 EF 12 MM RT TIB KNEE POLYETH: Type: IMPLANTABLE DEVICE | Site: TIBIA | Status: FUNCTIONAL

## 2023-10-03 DEVICE — COMPONENT PAT DIA35MM THK9MM KNEE POLY CEM CONVENTIONAL: Type: IMPLANTABLE DEVICE | Site: PATELLA | Status: FUNCTIONAL

## 2023-10-03 RX ORDER — HYDROMORPHONE HYDROCHLORIDE 1 MG/ML
0.25 INJECTION, SOLUTION INTRAMUSCULAR; INTRAVENOUS; SUBCUTANEOUS EVERY 5 MIN PRN
Status: DISCONTINUED | OUTPATIENT
Start: 2023-10-03 | End: 2023-10-03 | Stop reason: HOSPADM

## 2023-10-03 RX ORDER — MIDAZOLAM HYDROCHLORIDE 2 MG/2ML
2 INJECTION, SOLUTION INTRAMUSCULAR; INTRAVENOUS
Status: COMPLETED | OUTPATIENT
Start: 2023-10-03 | End: 2023-10-03

## 2023-10-03 RX ORDER — CELECOXIB 200 MG/1
200 CAPSULE ORAL ONCE
Status: COMPLETED | OUTPATIENT
Start: 2023-10-03 | End: 2023-10-03

## 2023-10-03 RX ORDER — DIPHENHYDRAMINE HYDROCHLORIDE 50 MG/ML
12.5 INJECTION INTRAMUSCULAR; INTRAVENOUS
Status: DISCONTINUED | OUTPATIENT
Start: 2023-10-03 | End: 2023-10-03 | Stop reason: HOSPADM

## 2023-10-03 RX ORDER — FAMOTIDINE 20 MG/1
20 TABLET, FILM COATED ORAL ONCE
Status: DISCONTINUED | OUTPATIENT
Start: 2023-10-03 | End: 2023-10-03 | Stop reason: HOSPADM

## 2023-10-03 RX ORDER — ROPIVACAINE HYDROCHLORIDE 2 MG/ML
INJECTION, SOLUTION EPIDURAL; INFILTRATION; PERINEURAL PRN
Status: DISCONTINUED | OUTPATIENT
Start: 2023-10-03 | End: 2023-10-03 | Stop reason: ALTCHOICE

## 2023-10-03 RX ORDER — LABETALOL HYDROCHLORIDE 5 MG/ML
10 INJECTION, SOLUTION INTRAVENOUS
Status: DISCONTINUED | OUTPATIENT
Start: 2023-10-03 | End: 2023-10-03 | Stop reason: HOSPADM

## 2023-10-03 RX ORDER — DEXAMETHASONE SODIUM PHOSPHATE 10 MG/ML
8 INJECTION, SOLUTION INTRAMUSCULAR; INTRAVENOUS ONCE
Status: DISCONTINUED | OUTPATIENT
Start: 2023-10-03 | End: 2023-10-03 | Stop reason: HOSPADM

## 2023-10-03 RX ORDER — ROPIVACAINE HYDROCHLORIDE 5 MG/ML
30 INJECTION, SOLUTION EPIDURAL; INFILTRATION; PERINEURAL ONCE
Status: DISCONTINUED | OUTPATIENT
Start: 2023-10-03 | End: 2023-10-03 | Stop reason: HOSPADM

## 2023-10-03 RX ORDER — SODIUM CHLORIDE 0.9 % (FLUSH) 0.9 %
5-40 SYRINGE (ML) INJECTION PRN
Status: DISCONTINUED | OUTPATIENT
Start: 2023-10-03 | End: 2023-10-03 | Stop reason: HOSPADM

## 2023-10-03 RX ORDER — PROPOFOL 10 MG/ML
INJECTION, EMULSION INTRAVENOUS PRN
Status: DISCONTINUED | OUTPATIENT
Start: 2023-10-03 | End: 2023-10-03 | Stop reason: SDUPTHER

## 2023-10-03 RX ORDER — ONDANSETRON 2 MG/ML
INJECTION INTRAMUSCULAR; INTRAVENOUS PRN
Status: DISCONTINUED | OUTPATIENT
Start: 2023-10-03 | End: 2023-10-03 | Stop reason: SDUPTHER

## 2023-10-03 RX ORDER — ROPIVACAINE HYDROCHLORIDE 5 MG/ML
INJECTION, SOLUTION EPIDURAL; INFILTRATION; PERINEURAL
Status: COMPLETED | OUTPATIENT
Start: 2023-10-03 | End: 2023-10-03

## 2023-10-03 RX ORDER — HYDROMORPHONE HYDROCHLORIDE 1 MG/ML
0.5 INJECTION, SOLUTION INTRAMUSCULAR; INTRAVENOUS; SUBCUTANEOUS EVERY 5 MIN PRN
Status: DISCONTINUED | OUTPATIENT
Start: 2023-10-03 | End: 2023-10-03 | Stop reason: HOSPADM

## 2023-10-03 RX ORDER — SODIUM CHLORIDE 9 MG/ML
INJECTION, SOLUTION INTRAVENOUS PRN
Status: DISCONTINUED | OUTPATIENT
Start: 2023-10-03 | End: 2023-10-03 | Stop reason: HOSPADM

## 2023-10-03 RX ORDER — OXYCODONE HCL 10 MG/1
10 TABLET, FILM COATED, EXTENDED RELEASE ORAL ONCE
Status: COMPLETED | OUTPATIENT
Start: 2023-10-03 | End: 2023-10-03

## 2023-10-03 RX ORDER — TRANEXAMIC ACID 650 MG/1
1950 TABLET ORAL
Status: COMPLETED | OUTPATIENT
Start: 2023-10-03 | End: 2023-10-03

## 2023-10-03 RX ORDER — FENTANYL CITRATE 50 UG/ML
INJECTION, SOLUTION INTRAMUSCULAR; INTRAVENOUS PRN
Status: DISCONTINUED | OUTPATIENT
Start: 2023-10-03 | End: 2023-10-03 | Stop reason: SDUPTHER

## 2023-10-03 RX ORDER — SCOLOPAMINE TRANSDERMAL SYSTEM 1 MG/1
1 PATCH, EXTENDED RELEASE TRANSDERMAL ONCE
Status: DISCONTINUED | OUTPATIENT
Start: 2023-10-03 | End: 2023-10-03 | Stop reason: HOSPADM

## 2023-10-03 RX ORDER — LIDOCAINE HYDROCHLORIDE 10 MG/ML
1 INJECTION, SOLUTION EPIDURAL; INFILTRATION; INTRACAUDAL; PERINEURAL
Status: DISCONTINUED | OUTPATIENT
Start: 2023-10-03 | End: 2023-10-03 | Stop reason: HOSPADM

## 2023-10-03 RX ORDER — IPRATROPIUM BROMIDE AND ALBUTEROL SULFATE 2.5; .5 MG/3ML; MG/3ML
1 SOLUTION RESPIRATORY (INHALATION)
Status: DISCONTINUED | OUTPATIENT
Start: 2023-10-03 | End: 2023-10-03 | Stop reason: HOSPADM

## 2023-10-03 RX ORDER — SODIUM CHLORIDE 0.9 % (FLUSH) 0.9 %
5-40 SYRINGE (ML) INJECTION EVERY 12 HOURS SCHEDULED
Status: DISCONTINUED | OUTPATIENT
Start: 2023-10-03 | End: 2023-10-03 | Stop reason: HOSPADM

## 2023-10-03 RX ORDER — SODIUM CHLORIDE, SODIUM LACTATE, POTASSIUM CHLORIDE, CALCIUM CHLORIDE 600; 310; 30; 20 MG/100ML; MG/100ML; MG/100ML; MG/100ML
INJECTION, SOLUTION INTRAVENOUS CONTINUOUS
Status: DISCONTINUED | OUTPATIENT
Start: 2023-10-03 | End: 2023-10-03 | Stop reason: HOSPADM

## 2023-10-03 RX ORDER — KETOROLAC TROMETHAMINE 30 MG/ML
INJECTION, SOLUTION INTRAMUSCULAR; INTRAVENOUS PRN
Status: DISCONTINUED | OUTPATIENT
Start: 2023-10-03 | End: 2023-10-03 | Stop reason: SDUPTHER

## 2023-10-03 RX ORDER — OXYCODONE HYDROCHLORIDE 5 MG/1
5 TABLET ORAL ONCE
Status: COMPLETED | OUTPATIENT
Start: 2023-10-03 | End: 2023-10-03

## 2023-10-03 RX ORDER — CEPHALEXIN 500 MG/1
500 CAPSULE ORAL 4 TIMES DAILY
Qty: 28 CAPSULE | Refills: 0 | Status: SHIPPED | OUTPATIENT
Start: 2023-10-03

## 2023-10-03 RX ORDER — PROCHLORPERAZINE EDISYLATE 5 MG/ML
5 INJECTION INTRAMUSCULAR; INTRAVENOUS
Status: COMPLETED | OUTPATIENT
Start: 2023-10-03 | End: 2023-10-03

## 2023-10-03 RX ORDER — ASPIRIN 81 MG/1
81 TABLET, CHEWABLE ORAL 2 TIMES DAILY
Qty: 60 TABLET | Refills: 0 | Status: SHIPPED | OUTPATIENT
Start: 2023-10-03

## 2023-10-03 RX ORDER — HYDRALAZINE HYDROCHLORIDE 20 MG/ML
10 INJECTION INTRAMUSCULAR; INTRAVENOUS
Status: DISCONTINUED | OUTPATIENT
Start: 2023-10-03 | End: 2023-10-03 | Stop reason: HOSPADM

## 2023-10-03 RX ORDER — LIDOCAINE HYDROCHLORIDE 10 MG/ML
INJECTION, SOLUTION INFILTRATION; PERINEURAL PRN
Status: DISCONTINUED | OUTPATIENT
Start: 2023-10-03 | End: 2023-10-03 | Stop reason: SDUPTHER

## 2023-10-03 RX ORDER — ACETAMINOPHEN 500 MG
1000 TABLET ORAL ONCE
Status: COMPLETED | OUTPATIENT
Start: 2023-10-03 | End: 2023-10-03

## 2023-10-03 RX ORDER — ROCURONIUM BROMIDE 10 MG/ML
INJECTION, SOLUTION INTRAVENOUS PRN
Status: DISCONTINUED | OUTPATIENT
Start: 2023-10-03 | End: 2023-10-03 | Stop reason: SDUPTHER

## 2023-10-03 RX ORDER — OXYCODONE HYDROCHLORIDE 5 MG/1
5 TABLET ORAL EVERY 4 HOURS PRN
Qty: 50 TABLET | Refills: 0 | Status: SHIPPED | OUTPATIENT
Start: 2023-10-03 | End: 2023-10-11

## 2023-10-03 RX ORDER — VANCOMYCIN HYDROCHLORIDE 1 G/20ML
INJECTION, POWDER, LYOPHILIZED, FOR SOLUTION INTRAVENOUS PRN
Status: DISCONTINUED | OUTPATIENT
Start: 2023-10-03 | End: 2023-10-03 | Stop reason: ALTCHOICE

## 2023-10-03 RX ADMIN — FENTANYL CITRATE 100 MCG: 0.05 INJECTION, SOLUTION INTRAMUSCULAR; INTRAVENOUS at 07:08

## 2023-10-03 RX ADMIN — LIDOCAINE HYDROCHLORIDE 50 MG: 10 INJECTION, SOLUTION INFILTRATION; PERINEURAL at 07:08

## 2023-10-03 RX ADMIN — ROCURONIUM BROMIDE 45 MG: 10 INJECTION, SOLUTION INTRAVENOUS at 07:10

## 2023-10-03 RX ADMIN — ONDANSETRON 4 MG: 2 INJECTION INTRAMUSCULAR; INTRAVENOUS at 08:22

## 2023-10-03 RX ADMIN — PROCHLORPERAZINE EDISYLATE 5 MG: 5 INJECTION INTRAMUSCULAR; INTRAVENOUS at 09:59

## 2023-10-03 RX ADMIN — KETOROLAC TROMETHAMINE 15 MG: 30 INJECTION, SOLUTION INTRAMUSCULAR; INTRAVENOUS at 08:26

## 2023-10-03 RX ADMIN — PHENYLEPHRINE HYDROCHLORIDE 100 MCG: 10 INJECTION INTRAVENOUS at 08:08

## 2023-10-03 RX ADMIN — PHENYLEPHRINE HYDROCHLORIDE 100 MCG: 10 INJECTION INTRAVENOUS at 08:05

## 2023-10-03 RX ADMIN — SUGAMMADEX 200 MG: 100 INJECTION, SOLUTION INTRAVENOUS at 08:22

## 2023-10-03 RX ADMIN — CEFAZOLIN 2000 MG: 2 INJECTION, POWDER, FOR SOLUTION INTRAMUSCULAR; INTRAVENOUS at 07:15

## 2023-10-03 RX ADMIN — ROCURONIUM BROMIDE 5 MG: 10 INJECTION, SOLUTION INTRAVENOUS at 07:09

## 2023-10-03 RX ADMIN — ROPIVACAINE HYDROCHLORIDE 20 ML: 5 INJECTION, SOLUTION EPIDURAL; INFILTRATION; PERINEURAL at 06:54

## 2023-10-03 RX ADMIN — TRANEXAMIC ACID 1950 MG: 650 TABLET ORAL at 06:13

## 2023-10-03 RX ADMIN — CELECOXIB 200 MG: 200 CAPSULE ORAL at 06:12

## 2023-10-03 RX ADMIN — OXYCODONE HYDROCHLORIDE 5 MG: 5 TABLET ORAL at 10:23

## 2023-10-03 RX ADMIN — SODIUM CHLORIDE, SODIUM LACTATE, POTASSIUM CHLORIDE, AND CALCIUM CHLORIDE: 600; 310; 30; 20 INJECTION, SOLUTION INTRAVENOUS at 06:02

## 2023-10-03 RX ADMIN — PROPOFOL 130 MG: 10 INJECTION, EMULSION INTRAVENOUS at 07:09

## 2023-10-03 RX ADMIN — OXYCODONE HYDROCHLORIDE 10 MG: 10 TABLET, FILM COATED, EXTENDED RELEASE ORAL at 06:12

## 2023-10-03 RX ADMIN — ACETAMINOPHEN 1000 MG: 500 TABLET ORAL at 06:12

## 2023-10-03 RX ADMIN — MIDAZOLAM 2 MG: 1 INJECTION INTRAMUSCULAR; INTRAVENOUS at 06:53

## 2023-10-03 RX ADMIN — SODIUM CHLORIDE, SODIUM LACTATE, POTASSIUM CHLORIDE, AND CALCIUM CHLORIDE: 600; 310; 30; 20 INJECTION, SOLUTION INTRAVENOUS at 07:49

## 2023-10-03 RX ADMIN — PHENYLEPHRINE HYDROCHLORIDE 100 MCG: 10 INJECTION INTRAVENOUS at 08:20

## 2023-10-03 ASSESSMENT — PAIN SCALES - GENERAL
PAINLEVEL_OUTOF10: 8
PAINLEVEL_OUTOF10: 8

## 2023-10-03 ASSESSMENT — LIFESTYLE VARIABLES: SMOKING_STATUS: 0

## 2023-10-03 ASSESSMENT — PAIN - FUNCTIONAL ASSESSMENT: PAIN_FUNCTIONAL_ASSESSMENT: NONE - DENIES PAIN

## 2023-10-03 NOTE — PROGRESS NOTES
Physical Therapy     Facility/Department: [facility]  Initial Assessment  PHYSICAL THERAPY EVALUATION      Gerald Malcolm    : 1960  MRN: [id number]   PHYSICIAN:  Chetan Chand,*  Primary Problem    Patient Active Problem List   Diagnosis    Elevated LFTs    Primary hypertension    Pure hypercholesterolemia    Gastroesophageal reflux disease without esophagitis    Drug-induced constipation    Type 2 diabetes mellitus with hyperglycemia, without long-term current use of insulin ([facility])       Rehabilitation Diagnosis:     Primary osteoarthritis of right knee [M17.11]     SERVICE DATE: 10/3/2023      SUBJECTIVE: Patient states that they are planning on discharging home today    Pain Screening   Patient currently rates pain at 10/10 nursing is aware and managing pain    PRIOR LEVEL OF FUNCTION:    Independent in the community without assistive device    OBJECTIVE:  Orientation: Within functional Limits      ROM - Passive, Non-operative side    Left lower extremity  Within functional limits    ROM - Passive, operative side    Right lower extremity  Hip - flexion to 80 degrees extension to 0 degrees    STRENGTH - Non-operative side    Left lower extremity grossly: Within functional limits    STRENGTH - operative side    Right knee: 2/5     TRANSFERS   Sit to stand     Minimum assist     Bed to chair    Minimum assist    Bed mobility   Supine to sit      Moderate assist    AMBULATION   Weight bearing: - WBAT      Distance: 20 feet     Device: Rolling Walker - The patient has been trained on the use of this equipment     Assistance: Minimum assist    ARCHITECTURAL BARRIERS:    Number of steps into house: 1    Number of hand rails: 0    Hand rail location: [] Left  [] Right      Patient and  was instructed on ascending and descending steps. Following instructions on step management, they verbalized an understanding of a safe technique.       BALANCE   Sitting: Fair      Standing:

## 2023-10-03 NOTE — PROGRESS NOTES
CLINICAL PHARMACY NOTE: MEDS TO BEDS    Total # of Prescriptions Filled: 4   The following medications were delivered to the patient:  Discharge Medication List as of 10/3/2023  9:36 AM        START taking these medications    Details   aspirin (ASPIRIN CHILDRENS) 81 MG chewable tablet Take 1 tablet by mouth in the morning and at bedtime, Disp-60 tablet, R-0Normal      oxyCODONE (ROXICODONE) 5 MG immediate release tablet Take 1 tablet by mouth every 4 hours as needed for Pain for up to 8 days. Max Daily Amount: 30 mg, Disp-50 tablet, R-0Normal      cephALEXin (KEFLEX) 500 MG capsule Take 1 capsule by mouth 4 times daily, Disp-28 capsule, R-0Normal           Naloxone nasal spray    Additional Documentation:    Delivered RX's to patient bedside. Paid with card.

## 2023-10-03 NOTE — H&P
[facility] ([facility]) Pre-Operative History and Physical    Patient Name: Elena Gu  : 1960        Chief Complaint: Right knee pain  History of Present Illness: This patient has had ongoing pain for several weeks/months that has been unresponsive to conservative care which has included injection, therapy, activity modification and presents now for surgery. Patient is status post left total knee arthroplasty in March and doing well. Past Medical History:       Diagnosis Date    Arthritis     Colon polyps     Diabetes (HCC)     High cholesterol     Hip pain     HPV (human papilloma virus) anogenital infection 2017    Hypertension     Knee pain     Left bundle branch block     Sleep apnea      Past Surgical History:       Procedure Laterality Date    BLADDER REPAIR      Dr. Blanca Burton, got her bladder lifted    COLONOSCOPY  2014    Dr. Walker Bauer    COLONOSCOPY  2018    Dr. Mari Mares (CERVIX STATUS UNKNOWN)      vaginal, ovaries remain, done with her bladder repair    KNEE SURGERY Left 2023    LEFT KNEE MANIPULATION performed by Preeti Lyon MD at [facility] Left 10/13/2022    LEFT HIP TOTAL ARTHROPLASTY performed by Preeti Lyon MD at [address] [place] [address] Left 2023    LEFT ROBOTIC KNEE TOTAL ARTHROPLASTY performed by Preeti Lyon MD at [id number] [address]         Medications:   Prior to Admission medications    Medication Sig Start Date End Date Taking?  Authorizing Provider   ibuprofen (ADVIL;MOTRIN) 400 MG tablet Take 1 tablet by mouth every 6 hours as needed for Pain    Historical Provider, MD   acetaminophen (TYLENOL) 500 MG tablet Take 2 tablets by mouth every 6 hours as needed for Pain    Historical Provider, MD   lisinopril-hydroCHLOROthiazide (PRINZIDE;ZESTORETIC) 20-12.5 MG per tablet Take 1 tablet by mouth daily    Historical Provider, MD   rosuvastatin (CRESTOR)

## 2023-10-03 NOTE — OP NOTE
The patient was awakened, extubated and transferred to the recovery room in stable condition.             PLAN:  Full weight bearing, ROM, dvt prophylaxis        Electronically signed by Christianne Ramsay MD on 10/3/2023 at 8:17 AM

## 2023-10-03 NOTE — ANESTHESIA PROCEDURE NOTES
Peripheral Block    Patient location during procedure: holding area  Reason for block: post-op pain management  Start time: 10/3/2023 6:54 AM  End time: 10/3/2023 6:56 AM  Staffing  Performed: anesthesiologist   Anesthesiologist: Marjorie Bird MD  Performed by: Marjorie Bird MD  Authorized by: Marjorie Bird MD    Preanesthetic Checklist  Completed: patient identified, IV checked, site marked, risks and benefits discussed, surgical/procedural consents, equipment checked, pre-op evaluation, timeout performed, anesthesia consent given, oxygen available, monitors applied/VS acknowledged, fire risk safety assessment completed and verbalized and blood product R/B/A discussed and consented  Peripheral Block   Patient position: supine  Prep: ChloraPrep  Provider prep: mask and sterile gloves  Patient monitoring: cardiac monitor, continuous pulse ox and frequent blood pressure checks  Block type: Femoral  Adductor canal  Laterality: right  Injection technique: single-shot  Guidance: ultrasound guided  Local infiltration: lidocaine  Infiltration strength: 1 %  Local infiltration: lidocaine  Dose: 0.5 mL    Needle   Needle type: insulated echogenic nerve stimulator needle   Needle gauge: 20 G  Needle localization: ultrasound guidance  Needle length: 10 cm  Assessment   Injection assessment: negative aspiration for heme, no paresthesia on injection, local visualized surrounding nerve on ultrasound and no intravascular symptoms  Paresthesia pain: none  Slow fractionated injection: yes  Hemodynamics: stable  Real-time US image taken/store: yes  Outcomes: uncomplicated and patient tolerated procedure well    Medications Administered  ropivacaine (NAROPIN) injection 0.5% - Perineural   20 mL - 10/3/2023 6:54:00 AM

## 2023-10-03 NOTE — PROGRESS NOTES
Physical Therapy     Facility/Department: [facility] OR  Initial Assessment  PHYSICAL THERAPY EVALUATION      Carlyle Canavan    : 1960  MRN: [id number]   PHYSICIAN:  Preeti Lyon,*  Primary Problem    Patient Active Problem List   Diagnosis    Elevated LFTs    Primary hypertension    Pure hypercholesterolemia    Gastroesophageal reflux disease without esophagitis    Drug-induced constipation    Type 2 diabetes mellitus with hyperglycemia, without long-term current use of insulin ([facility])       Rehabilitation Diagnosis:     Primary osteoarthritis of right knee [M17.11]     SERVICE DATE: 10/3/2023      SUBJECTIVE: Patient states that they are planning on discharging home today    Pain Screening   Patient currently rates pain at 10/10 nursing is aware and managing pain    PRIOR LEVEL OF FUNCTION:    Independent in the community without assistive device    OBJECTIVE:  Orientation: Within functional Limits      ROM - Passive, Non-operative side    Left lower extremity  Within functional limits    ROM - Passive, operative side    Right lower extremity  Hip - flexion to 80 degrees extension to 0 degrees    STRENGTH - Non-operative side    Left lower extremity grossly: Within functional limits    STRENGTH - operative side    Right knee: 2/5     TRANSFERS   Sit to stand     Minimum assist     Bed to chair    Minimum assist    Bed mobility   Supine to sit      Moderate assist    AMBULATION   Weight bearing: - WBAT      Distance: 20 feet     Device: Rolling Walker - The patient has been trained on the use of this equipment     Assistance: Minimum assist  BALANCE   Sitting: Fair      Standing: Fair    ASSESSMENT   Activity limitations: Decreased functional mobility   Patient will benefit from continuing skilled physical therapy to improve mobility    Activity Tolerance  Activity Tolerance: Patient Tolerated treatment well     PLAN        Physical therapy to see 7 X/ week for 2 weeks then reassess.  Plan of care to

## 2023-10-13 ENCOUNTER — TRANSCRIBE ORDERS (OUTPATIENT)
Dept: ADMINISTRATIVE | Facility: HOSPITAL | Age: 63
End: 2023-10-13
Payer: COMMERCIAL

## 2023-10-13 ENCOUNTER — HOSPITAL ENCOUNTER (OUTPATIENT)
Dept: ULTRASOUND IMAGING | Facility: HOSPITAL | Age: 63
Discharge: HOME | End: 2023-10-13
Payer: COMMERCIAL

## 2023-10-13 DIAGNOSIS — R60.0 LOCALIZED EDEMA: ICD-10-CM

## 2023-10-13 DIAGNOSIS — M25.561 RIGHT KNEE PAIN, UNSPECIFIED CHRONICITY: Primary | ICD-10-CM

## 2023-10-13 DIAGNOSIS — T84.84XA PAIN DUE TO INTERNAL ORTHOPEDIC PROSTHETIC DEVICE, INITIAL ENCOUNTER: ICD-10-CM

## 2023-10-13 DIAGNOSIS — M25.561 RIGHT KNEE PAIN, UNSPECIFIED CHRONICITY: ICD-10-CM

## 2023-10-13 PROCEDURE — 93971 EXTREMITY STUDY: CPT

## 2024-01-31 NOTE — H&P (VIEW-ONLY)
Chief Complaint   Patient presents with    Colonoscopy     4-18-18 colon polyps  5 year recall anemia now       PCP: Ruperto Hendricks MD  REFER: No ref. provider found    Subjective     HPI    Martha Parra referred to office for evaluation of iron deficiency anemia secondary to blood loss.  Anemia is not new.  Hgb 1/12/24 was 10.6 with MCV 90.  BUN/Cr ratio was elevated at 29 (ratio 26 9/2023).  Bowel habit described as moving daily, occasionally stool is hard.  No signs of GI blood loss.  No nausea/vomitting.  No heartburn, no indigestion (she will experience heartburn if she eats late and lies down). No dysphagia.  She has lost 10 lb over past several months, she is currently on Mournjoro.   Denies frequent use of NSAIDs.   Pressure to abdomen increases discomfort.    Positive family history of colon cancer     COLONOSCOPY (04/18/2018 08:18)   Tissue Pathology Exam (04/18/2018 08:36) -tubular adenoma    SCANNED - COLONOSCOPY (04/01/2014 00:00) hyperplastic rectal polyp - total of 7 polyp removed from rectum       Past Medical History:   Diagnosis Date    Arthritis     Diabetes     Hyperlipidemia     Hypertension        Past Surgical History:   Procedure Laterality Date    COLONOSCOPY N/A 4/18/2018    Procedure: COLONOSCOPY WITH ANESTHESIA;  Surgeon: Chandler Farris DO;  Location: [facility] ENDOSCOPY;  Service: Gastroenterology    COLONOSCOPY W/ POLYPECTOMY  03/28/2014    several rectal polyps which were fairly nonworrisome and all removed    HYSTERECTOMY      TUBAL ABDOMINAL LIGATION         Outpatient Medications Marked as Taking for the 2/1/24 encounter (Office Visit) with Manuel Mckeon APRN   Medication Sig Dispense Refill    ezetimibe (Zetia) 10 MG tablet Take 1 tablet by mouth Daily.      lisinopril-hydrochlorothiazide (PRINZIDE,ZESTORETIC) 20-12.5 MG per tablet Take 1 tablet by mouth Daily.      meloxicam (MOBIC) 15 MG tablet Take 1 tablet by mouth Daily.      metFORMIN (GLUCOPHAGE) 1000  "MG tablet Take 1 tablet by mouth Every 12 (Twelve) Hours.      rosuvastatin (CRESTOR) 5 MG tablet Take 1 tablet by mouth Daily.      Tirzepatide (Mounjaro) 7.5 MG/0.5ML solution pen-injector pen Inject 0.5 mL under the skin into the appropriate area as directed 1 (One) Time Per Week.         No Known Allergies    Social History     Socioeconomic History    Marital status:    Tobacco Use    Smoking status: Never    Smokeless tobacco: Never   Vaping Use    Vaping Use: Never used   Substance and Sexual Activity    Alcohol use: No    Drug use: No    Sexual activity: Not Currently       Review of Systems   Constitutional:  Negative for fever and unexpected weight change.   HENT:  Negative for trouble swallowing.    Respiratory:  Negative for shortness of breath.    Cardiovascular:  Negative for chest pain.   Gastrointestinal:  Negative for abdominal pain and anal bleeding.       Objective     Vitals:    02/01/24 0901   BP: 126/80   Pulse: 80   Temp: 97 °F (36.1 °C)   SpO2: 98%   Weight: 74.8 kg (165 lb)   Height: 162.6 cm (64\")     Body mass index is 28.32 kg/m².    Physical Exam  Constitutional:       Appearance: Normal appearance. She is well-developed.   Eyes:      General: No scleral icterus.  Cardiovascular:      Heart sounds: Normal heart sounds. No murmur heard.  Pulmonary:      Effort: Pulmonary effort is normal.   Abdominal:      General: Bowel sounds are normal. There is no distension.      Palpations: Abdomen is soft.      Tenderness: There is no abdominal tenderness. There is no guarding.   Skin:     General: Skin is warm and dry.      Coloration: Skin is not jaundiced.   Neurological:      Mental Status: She is alert.   Psychiatric:         Behavior: Behavior is cooperative.         Imaging Results (Most Recent)       None            Body mass index is 28.32 kg/m².    Assessment & Plan     Diagnoses and all orders for this visit:    1. Iron deficiency anemia, unspecified iron deficiency anemia type " (Primary)  -     Case Request; Standing  -     Case Request    2. History of colon polyps    3. Long-term (current) use of injectable non-insulin antidiabetic drugs    Other orders  -     Implement Anesthesia Orders Day of Procedure; Standing  -     Verify bowel prep was successful; Standing  -     Give tap water enema if bowel prep was insufficient; Standing  -     Obtain Informed Consent; Standing         COLONOSCOPY WITH ANESTHESIA (N/A), ESOPHAGOGASTRODUODENOSCOPY WITH ANESTHESIA (N/A)  Miralax prep     Hgb stable compared to 4 months ago, at which time it was 10.6.  she has not had stool testing.  However, due to history of colon polyps I do think proceeding with colonoscopy is reasonable.   If she was losing blood I would suspect hgb would be lower now compared to results from 4 months and 11 months ago.   Will proceed with egd with colonoscopy to ensure no other underlying factor contributing to anemia     Advised pt to stop use of NSAIDs, Fish Oil, and MV 5 days prior to procedure, per Dr Farris protocol.  Tylenol based products are ok to take.  Pt verbalized understanding.    Martha Parra asked to hold Tirzepatide (Mournjaro) x 7 days prior to procedure per The American Society of Anesthesia guidelines      All risks, benefits, alternatives, and indications of colonoscopy procedure have been discussed with the patient. Risks to include perforation of the colon requiring possible surgery or colostomy, risk of bleeding from biopsies or removal of colon tissue, possibility of missing a colon polyp or cancer, or adverse drug reaction.  Benefits to include the diagnosis and management of disease of the colon and rectum. Alternatives to include barium enema, radiographic evaluation, lab testing or no intervention. Pt verbalizes understanding and agrees to proceed with procedure.        Manuel Mckeon, APRN  02/01/24          There are no Patient Instructions on file for this visit.

## 2024-01-31 NOTE — PROGRESS NOTES
Chief Complaint   Patient presents with    Colonoscopy     4-18-18 colon polyps  5 year recall anemia now       PCP: Ruperto Hendricks MD  REFER: No ref. provider found    Subjective     HPI    Martha Parra referred to office for evaluation of iron deficiency anemia secondary to blood loss.  Anemia is not new.  Hgb 1/12/24 was 10.6 with MCV 90.  BUN/Cr ratio was elevated at 29 (ratio 26 9/2023).  Bowel habit described as moving daily, occasionally stool is hard.  No signs of GI blood loss.  No nausea/vomitting.  No heartburn, no indigestion (she will experience heartburn if she eats late and lies down). No dysphagia.  She has lost 10 lb over past several months, she is currently on Mournjoro.   Denies frequent use of NSAIDs.   Pressure to abdomen increases discomfort.    Positive family history of colon cancer     COLONOSCOPY (04/18/2018 08:18)   Tissue Pathology Exam (04/18/2018 08:36) -tubular adenoma    SCANNED - COLONOSCOPY (04/01/2014 00:00) hyperplastic rectal polyp - total of 7 polyp removed from rectum       Past Medical History:   Diagnosis Date    Arthritis     Diabetes     Hyperlipidemia     Hypertension        Past Surgical History:   Procedure Laterality Date    COLONOSCOPY N/A 4/18/2018    Procedure: COLONOSCOPY WITH ANESTHESIA;  Surgeon: Chandler Farris DO;  Location: [facility] ENDOSCOPY;  Service: Gastroenterology    COLONOSCOPY W/ POLYPECTOMY  03/28/2014    several rectal polyps which were fairly nonworrisome and all removed    HYSTERECTOMY      TUBAL ABDOMINAL LIGATION         Outpatient Medications Marked as Taking for the 2/1/24 encounter (Office Visit) with Manuel Mckeon APRN   Medication Sig Dispense Refill    ezetimibe (Zetia) 10 MG tablet Take 1 tablet by mouth Daily.      lisinopril-hydrochlorothiazide (PRINZIDE,ZESTORETIC) 20-12.5 MG per tablet Take 1 tablet by mouth Daily.      meloxicam (MOBIC) 15 MG tablet Take 1 tablet by mouth Daily.      metFORMIN (GLUCOPHAGE) 1000  "MG tablet Take 1 tablet by mouth Every 12 (Twelve) Hours.      rosuvastatin (CRESTOR) 5 MG tablet Take 1 tablet by mouth Daily.      Tirzepatide (Mounjaro) 7.5 MG/0.5ML solution pen-injector pen Inject 0.5 mL under the skin into the appropriate area as directed 1 (One) Time Per Week.         No Known Allergies    Social History     Socioeconomic History    Marital status:    Tobacco Use    Smoking status: Never    Smokeless tobacco: Never   Vaping Use    Vaping Use: Never used   Substance and Sexual Activity    Alcohol use: No    Drug use: No    Sexual activity: Not Currently       Review of Systems   Constitutional:  Negative for fever and unexpected weight change.   HENT:  Negative for trouble swallowing.    Respiratory:  Negative for shortness of breath.    Cardiovascular:  Negative for chest pain.   Gastrointestinal:  Negative for abdominal pain and anal bleeding.       Objective     Vitals:    02/01/24 0901   BP: 126/80   Pulse: 80   Temp: 97 °F (36.1 °C)   SpO2: 98%   Weight: 74.8 kg (165 lb)   Height: 162.6 cm (64\")     Body mass index is 28.32 kg/m².    Physical Exam  Constitutional:       Appearance: Normal appearance. She is well-developed.   Eyes:      General: No scleral icterus.  Cardiovascular:      Heart sounds: Normal heart sounds. No murmur heard.  Pulmonary:      Effort: Pulmonary effort is normal.   Abdominal:      General: Bowel sounds are normal. There is no distension.      Palpations: Abdomen is soft.      Tenderness: There is no abdominal tenderness. There is no guarding.   Skin:     General: Skin is warm and dry.      Coloration: Skin is not jaundiced.   Neurological:      Mental Status: She is alert.   Psychiatric:         Behavior: Behavior is cooperative.         Imaging Results (Most Recent)       None            Body mass index is 28.32 kg/m².    Assessment & Plan     Diagnoses and all orders for this visit:    1. Iron deficiency anemia, unspecified iron deficiency anemia type " (Primary)  -     Case Request; Standing  -     Case Request    2. History of colon polyps    3. Long-term (current) use of injectable non-insulin antidiabetic drugs    Other orders  -     Implement Anesthesia Orders Day of Procedure; Standing  -     Verify bowel prep was successful; Standing  -     Give tap water enema if bowel prep was insufficient; Standing  -     Obtain Informed Consent; Standing         COLONOSCOPY WITH ANESTHESIA (N/A), ESOPHAGOGASTRODUODENOSCOPY WITH ANESTHESIA (N/A)  Miralax prep     Hgb stable compared to 4 months ago, at which time it was 10.6.  she has not had stool testing.  However, due to history of colon polyps I do think proceeding with colonoscopy is reasonable.   If she was losing blood I would suspect hgb would be lower now compared to results from 4 months and 11 months ago.   Will proceed with egd with colonoscopy to ensure no other underlying factor contributing to anemia     Advised pt to stop use of NSAIDs, Fish Oil, and MV 5 days prior to procedure, per Dr Farris protocol.  Tylenol based products are ok to take.  Pt verbalized understanding.    Martha Parra asked to hold Tirzepatide (Mournjaro) x 7 days prior to procedure per The American Society of Anesthesia guidelines      All risks, benefits, alternatives, and indications of colonoscopy procedure have been discussed with the patient. Risks to include perforation of the colon requiring possible surgery or colostomy, risk of bleeding from biopsies or removal of colon tissue, possibility of missing a colon polyp or cancer, or adverse drug reaction.  Benefits to include the diagnosis and management of disease of the colon and rectum. Alternatives to include barium enema, radiographic evaluation, lab testing or no intervention. Pt verbalizes understanding and agrees to proceed with procedure.        Manuel Mckeon, APRN  02/01/24          There are no Patient Instructions on file for this visit.

## 2024-02-01 ENCOUNTER — OFFICE VISIT (OUTPATIENT)
Dept: GASTROENTEROLOGY | Facility: CLINIC | Age: 64
End: 2024-02-01
Payer: COMMERCIAL

## 2024-02-01 VITALS
DIASTOLIC BLOOD PRESSURE: 80 MMHG | WEIGHT: 165 LBS | OXYGEN SATURATION: 98 % | SYSTOLIC BLOOD PRESSURE: 126 MMHG | BODY MASS INDEX: 28.17 KG/M2 | HEART RATE: 80 BPM | TEMPERATURE: 97 F | HEIGHT: 64 IN

## 2024-02-01 DIAGNOSIS — Z86.010 HISTORY OF COLON POLYPS: ICD-10-CM

## 2024-02-01 DIAGNOSIS — Z80.0 FAMILY HISTORY OF COLON CANCER: ICD-10-CM

## 2024-02-01 DIAGNOSIS — Z79.85 LONG-TERM (CURRENT) USE OF INJECTABLE NON-INSULIN ANTIDIABETIC DRUGS: ICD-10-CM

## 2024-02-01 DIAGNOSIS — D50.9 IRON DEFICIENCY ANEMIA, UNSPECIFIED IRON DEFICIENCY ANEMIA TYPE: Primary | ICD-10-CM

## 2024-02-01 PROCEDURE — 99204 OFFICE O/P NEW MOD 45 MIN: CPT | Performed by: NURSE PRACTITIONER

## 2024-02-01 RX ORDER — LISINOPRIL AND HYDROCHLOROTHIAZIDE 20; 12.5 MG/1; MG/1
1 TABLET ORAL DAILY
COMMUNITY

## 2024-02-01 RX ORDER — ROSUVASTATIN CALCIUM 5 MG/1
5 TABLET, COATED ORAL DAILY
COMMUNITY

## 2024-02-01 RX ORDER — EZETIMIBE 10 MG/1
10 TABLET ORAL DAILY
COMMUNITY

## 2024-02-02 PROBLEM — D50.9 IRON DEFICIENCY ANEMIA: Status: ACTIVE | Noted: 2024-02-01

## 2024-02-08 PROBLEM — Z80.0 FAMILY HISTORY OF COLON CANCER: Status: ACTIVE | Noted: 2024-02-08

## 2024-02-16 ENCOUNTER — ANESTHESIA EVENT (OUTPATIENT)
Dept: GASTROENTEROLOGY | Facility: HOSPITAL | Age: 64
End: 2024-02-16
Payer: COMMERCIAL

## 2024-02-16 ENCOUNTER — HOSPITAL ENCOUNTER (OUTPATIENT)
Facility: HOSPITAL | Age: 64
Setting detail: HOSPITAL OUTPATIENT SURGERY
Discharge: HOME | End: 2024-02-16
Attending: INTERNAL MEDICINE | Admitting: INTERNAL MEDICINE
Payer: COMMERCIAL

## 2024-02-16 ENCOUNTER — ANESTHESIA (OUTPATIENT)
Dept: GASTROENTEROLOGY | Facility: HOSPITAL | Age: 64
End: 2024-02-16
Payer: COMMERCIAL

## 2024-02-16 ENCOUNTER — TELEPHONE (OUTPATIENT)
Dept: GASTROENTEROLOGY | Facility: CLINIC | Age: 64
End: 2024-02-16
Payer: COMMERCIAL

## 2024-02-16 VITALS
OXYGEN SATURATION: 96 % | RESPIRATION RATE: 17 BRPM | TEMPERATURE: 96.8 F | SYSTOLIC BLOOD PRESSURE: 84 MMHG | BODY MASS INDEX: 26.98 KG/M2 | WEIGHT: 158 LBS | HEIGHT: 64 IN | HEART RATE: 102 BPM | DIASTOLIC BLOOD PRESSURE: 60 MMHG

## 2024-02-16 DIAGNOSIS — D50.9 IRON DEFICIENCY ANEMIA, UNSPECIFIED IRON DEFICIENCY ANEMIA TYPE: ICD-10-CM

## 2024-02-16 LAB — GLUCOSE BLDC GLUCOMTR-MCNC: 120 MG/DL (ref 70–130)

## 2024-02-16 PROCEDURE — 25810000003 SODIUM CHLORIDE 0.9 % SOLUTION: Performed by: ANESTHESIOLOGY

## 2024-02-16 PROCEDURE — 82948 REAGENT STRIP/BLOOD GLUCOSE: CPT

## 2024-02-16 PROCEDURE — 43239 EGD BIOPSY SINGLE/MULTIPLE: CPT | Performed by: INTERNAL MEDICINE

## 2024-02-16 PROCEDURE — 45330 DIAGNOSTIC SIGMOIDOSCOPY: CPT | Performed by: INTERNAL MEDICINE

## 2024-02-16 PROCEDURE — 25010000002 PROPOFOL 10 MG/ML EMULSION: Performed by: NURSE ANESTHETIST, CERTIFIED REGISTERED

## 2024-02-16 PROCEDURE — 88305 TISSUE EXAM BY PATHOLOGIST: CPT | Performed by: INTERNAL MEDICINE

## 2024-02-16 RX ORDER — SODIUM CHLORIDE 9 MG/ML
500 INJECTION, SOLUTION INTRAVENOUS CONTINUOUS PRN
Status: DISCONTINUED | OUTPATIENT
Start: 2024-02-16 | End: 2024-02-16 | Stop reason: HOSPADM

## 2024-02-16 RX ORDER — PROPOFOL 10 MG/ML
VIAL (ML) INTRAVENOUS AS NEEDED
Status: DISCONTINUED | OUTPATIENT
Start: 2024-02-16 | End: 2024-02-16 | Stop reason: SURG

## 2024-02-16 RX ORDER — LIDOCAINE HYDROCHLORIDE 10 MG/ML
0.5 INJECTION, SOLUTION EPIDURAL; INFILTRATION; INTRACAUDAL; PERINEURAL ONCE AS NEEDED
Status: DISCONTINUED | OUTPATIENT
Start: 2024-02-16 | End: 2024-02-16 | Stop reason: HOSPADM

## 2024-02-16 RX ORDER — LIDOCAINE HYDROCHLORIDE 20 MG/ML
INJECTION, SOLUTION EPIDURAL; INFILTRATION; INTRACAUDAL; PERINEURAL AS NEEDED
Status: DISCONTINUED | OUTPATIENT
Start: 2024-02-16 | End: 2024-02-16 | Stop reason: SURG

## 2024-02-16 RX ORDER — SODIUM CHLORIDE 0.9 % (FLUSH) 0.9 %
10 SYRINGE (ML) INJECTION AS NEEDED
Status: DISCONTINUED | OUTPATIENT
Start: 2024-02-16 | End: 2024-02-16 | Stop reason: HOSPADM

## 2024-02-16 RX ADMIN — PROPOFOL INJECTABLE EMULSION 50 MG: 10 INJECTION, EMULSION INTRAVENOUS at 10:31

## 2024-02-16 RX ADMIN — PROPOFOL INJECTABLE EMULSION 100 MG: 10 INJECTION, EMULSION INTRAVENOUS at 10:26

## 2024-02-16 RX ADMIN — SODIUM CHLORIDE 500 ML: 9 INJECTION, SOLUTION INTRAVENOUS at 09:07

## 2024-02-16 RX ADMIN — LIDOCAINE HYDROCHLORIDE 100 MG: 20 INJECTION, SOLUTION EPIDURAL; INFILTRATION; INTRACAUDAL; PERINEURAL at 10:26

## 2024-02-16 RX ADMIN — PROPOFOL INJECTABLE EMULSION 50 MG: 10 INJECTION, EMULSION INTRAVENOUS at 10:27

## 2024-02-16 RX ADMIN — PROPOFOL INJECTABLE EMULSION 50 MG: 10 INJECTION, EMULSION INTRAVENOUS at 10:29

## 2024-02-19 LAB
CYTO UR: NORMAL
LAB AP CASE REPORT: NORMAL
Lab: NORMAL
PATH REPORT.FINAL DX SPEC: NORMAL
PATH REPORT.GROSS SPEC: NORMAL

## 2024-02-20 ENCOUNTER — TELEPHONE (OUTPATIENT)
Dept: GASTROENTEROLOGY | Facility: CLINIC | Age: 64
End: 2024-02-20
Payer: COMMERCIAL

## 2024-02-20 NOTE — TELEPHONE ENCOUNTER
----- Message from Chandler Farris DO sent at 2/20/2024  7:11 AM CST -----  Please let patient know small bowel biopsy was negative     Thank you        ----- Message -----  From: Lab, Background User  Sent: 2/19/2024   2:07 PM CST  To: Chandler Farris DO

## 2024-02-27 DIAGNOSIS — Z80.0 FAMILY HISTORY OF COLON CANCER: ICD-10-CM

## 2024-02-27 DIAGNOSIS — D50.9 IRON DEFICIENCY ANEMIA, UNSPECIFIED IRON DEFICIENCY ANEMIA TYPE: Primary | ICD-10-CM

## 2024-02-27 DIAGNOSIS — Z86.010 HISTORY OF COLON POLYPS: ICD-10-CM

## 2024-02-27 DIAGNOSIS — Z79.85 LONG-TERM (CURRENT) USE OF INJECTABLE NON-INSULIN ANTIDIABETIC DRUGS: ICD-10-CM

## 2024-03-25 ENCOUNTER — HOSPITAL ENCOUNTER (OUTPATIENT)
Facility: HOSPITAL | Age: 64
Setting detail: HOSPITAL OUTPATIENT SURGERY
Discharge: HOME | End: 2024-03-25
Attending: INTERNAL MEDICINE | Admitting: INTERNAL MEDICINE
Payer: COMMERCIAL

## 2024-03-25 ENCOUNTER — TELEPHONE (OUTPATIENT)
Dept: GASTROENTEROLOGY | Facility: CLINIC | Age: 64
End: 2024-03-25
Payer: COMMERCIAL

## 2024-03-25 ENCOUNTER — ANESTHESIA (OUTPATIENT)
Dept: GASTROENTEROLOGY | Facility: HOSPITAL | Age: 64
End: 2024-03-25
Payer: COMMERCIAL

## 2024-03-25 ENCOUNTER — ANESTHESIA EVENT (OUTPATIENT)
Dept: GASTROENTEROLOGY | Facility: HOSPITAL | Age: 64
End: 2024-03-25
Payer: COMMERCIAL

## 2024-03-25 VITALS
OXYGEN SATURATION: 97 % | HEART RATE: 84 BPM | BODY MASS INDEX: 27.12 KG/M2 | TEMPERATURE: 97.2 F | HEIGHT: 64 IN | RESPIRATION RATE: 16 BRPM | SYSTOLIC BLOOD PRESSURE: 118 MMHG | DIASTOLIC BLOOD PRESSURE: 70 MMHG

## 2024-03-25 DIAGNOSIS — D50.9 IRON DEFICIENCY ANEMIA, UNSPECIFIED IRON DEFICIENCY ANEMIA TYPE: ICD-10-CM

## 2024-03-25 LAB — GLUCOSE BLDC GLUCOMTR-MCNC: 96 MG/DL (ref 70–130)

## 2024-03-25 PROCEDURE — 82948 REAGENT STRIP/BLOOD GLUCOSE: CPT

## 2024-03-25 PROCEDURE — 25810000003 SODIUM CHLORIDE 0.9 % SOLUTION: Performed by: ANESTHESIOLOGY

## 2024-03-25 PROCEDURE — 45378 DIAGNOSTIC COLONOSCOPY: CPT | Performed by: INTERNAL MEDICINE

## 2024-03-25 PROCEDURE — 25010000002 PROPOFOL 10 MG/ML EMULSION: Performed by: NURSE ANESTHETIST, CERTIFIED REGISTERED

## 2024-03-25 RX ORDER — SODIUM CHLORIDE 9 MG/ML
500 INJECTION, SOLUTION INTRAVENOUS CONTINUOUS PRN
Status: DISCONTINUED | OUTPATIENT
Start: 2024-03-25 | End: 2024-03-25 | Stop reason: HOSPADM

## 2024-03-25 RX ORDER — LIDOCAINE HYDROCHLORIDE 10 MG/ML
0.5 INJECTION, SOLUTION EPIDURAL; INFILTRATION; INTRACAUDAL; PERINEURAL ONCE AS NEEDED
Status: CANCELLED | OUTPATIENT
Start: 2024-03-25

## 2024-03-25 RX ORDER — PROPOFOL 10 MG/ML
VIAL (ML) INTRAVENOUS AS NEEDED
Status: DISCONTINUED | OUTPATIENT
Start: 2024-03-25 | End: 2024-03-25 | Stop reason: SURG

## 2024-03-25 RX ORDER — SODIUM CHLORIDE 0.9 % (FLUSH) 0.9 %
10 SYRINGE (ML) INJECTION AS NEEDED
Status: DISCONTINUED | OUTPATIENT
Start: 2024-03-25 | End: 2024-03-25 | Stop reason: HOSPADM

## 2024-03-25 RX ADMIN — PROPOFOL INJECTABLE EMULSION 80 MG: 10 INJECTION, EMULSION INTRAVENOUS at 10:48

## 2024-03-25 RX ADMIN — PROPOFOL INJECTABLE EMULSION 80 MG: 10 INJECTION, EMULSION INTRAVENOUS at 10:43

## 2024-03-25 RX ADMIN — PROPOFOL INJECTABLE EMULSION 80 MG: 10 INJECTION, EMULSION INTRAVENOUS at 10:45

## 2024-03-25 RX ADMIN — SODIUM CHLORIDE 500 ML: 9 INJECTION, SOLUTION INTRAVENOUS at 09:26

## 2024-03-25 RX ADMIN — PROPOFOL INJECTABLE EMULSION 80 MG: 10 INJECTION, EMULSION INTRAVENOUS at 10:40

## 2024-03-25 NOTE — ANESTHESIA POSTPROCEDURE EVALUATION
Patient: Martha Parra    Procedure Summary       Date: 03/25/24 Room / Location: [facility] ENDOSCOPY 2 / [facility] ENDOSCOPY    Anesthesia Start: 1037 Anesthesia Stop: 1055    Procedure: COLONOSCOPY WITH ANESTHESIA Diagnosis:       Iron deficiency anemia, unspecified iron deficiency anemia type      (Iron deficiency anemia, unspecified iron deficiency anemia type [D50.9])    Surgeons: Chandler Farris DO Provider: Clifford Mack CRNA    Anesthesia Type: MAC ASA Status: 2            Anesthesia Type: MAC    Vitals  Vitals Value Taken Time   /65 03/25/24 1056   Temp     Pulse 87 03/25/24 1057   Resp 12 03/25/24 1055   SpO2 97 % 03/25/24 1057   Vitals shown include unfiled device data.        Post Anesthesia Care and Evaluation    Patient location during evaluation: PHASE II  Patient participation: complete - patient participated  Level of consciousness: awake and alert  Pain management: adequate    Airway patency: patent  Anesthetic complications: No anesthetic complications  PONV Status: none  Cardiovascular status: acceptable and stable  Respiratory status: acceptable and unassisted  Hydration status: acceptable

## 2024-03-25 NOTE — ANESTHESIA PREPROCEDURE EVALUATION
Anesthesia Evaluation     Patient summary reviewed   no history of anesthetic complications:   NPO Solid Status: > 8 hours             Airway   Mallampati: I  No difficulty expected  Dental      Pulmonary    (+) ,sleep apnea  Cardiovascular   Exercise tolerance: good (4-7 METS)    (+) hypertension, hyperlipidemia      Neuro/Psych- negative ROS  GI/Hepatic/Renal/Endo    (+) diabetes mellitus    Musculoskeletal     Abdominal    Substance History      OB/GYN          Other                    Anesthesia Plan    ASA 2     MAC       Anesthetic plan, risks, benefits, and alternatives have been provided, discussed and informed consent has been obtained with: patient.    CODE STATUS:

## 2024-03-25 NOTE — H&P
[facility] Gastroenterology  Pre Procedure History & Physical    Chief Complaint:   Polyps    Subjective     HPI:   Polyps    Past Medical History:   Past Medical History:   Diagnosis Date    Anemia     Arthritis     Diabetes     Hyperlipidemia     Hypertension     Sleep apnea     does not wear machine       Past Surgical History:  Past Surgical History:   Procedure Laterality Date    COLONOSCOPY N/A 04/18/2018    Procedure: COLONOSCOPY WITH ANESTHESIA;  Surgeon: Chandler Farris DO;  Location: [facility] ENDOSCOPY;  Service: Gastroenterology    COLONOSCOPY N/A 2/16/2024    Procedure: COLONOSCOPY WITH ANESTHESIA;  Surgeon: Chandler Farris DO;  Location: [facility] ENDOSCOPY;  Service: Gastroenterology;  Laterality: N/A;  pre anemia  post normal  Dr. Wyatt    COLONOSCOPY W/ POLYPECTOMY  03/28/2014    several rectal polyps which were fairly nonworrisome and all removed    ENDOSCOPY N/A 2/16/2024    Procedure: ESOPHAGOGASTRODUODENOSCOPY WITH ANESTHESIA;  Surgeon: Chandler Farris DO;  Location: [facility] ENDOSCOPY;  Service: Gastroenterology;  Laterality: N/A;  pre anemia  post normal  Dr. Wyatt    HYSTERECTOMY      TUBAL ABDOMINAL LIGATION         Family History:  Family History   Problem Relation Age of Onset    Colon cancer Mother     Heart disease Father     No Known Problems Sister     Colon cancer Maternal Aunt     No Known Problems Daughter     No Known Problems Son     Colon polyps Neg Hx     Esophageal cancer Neg Hx        Social History:   reports that she has never smoked. She has never used smokeless tobacco. She reports that she does not drink alcohol and does not use drugs.    Medications:   Prior to Admission medications    Medication Sig Start Date End Date Taking? Authorizing Provider   ezetimibe (Zetia) 10 MG tablet Take 1 tablet by mouth Daily.    Provider, MD Chandler   lisinopril-hydrochlorothiazide (PRINZIDE,ZESTORETIC) 20-12.5 MG per tablet Take 1 tablet by mouth Daily.    Provider,  "MD Chandler   meloxicam (MOBIC) 15 MG tablet Take 1 tablet by mouth Daily.    Emergency, Nurse José Miguel RN   metFORMIN (GLUCOPHAGE) 1000 MG tablet Take 1 tablet by mouth Every 12 (Twelve) Hours.    Emergency, Nurse José Miguel RN   rosuvastatin (CRESTOR) 5 MG tablet Take 1 tablet by mouth Daily.    Provider, MD Chandler   Tirzepatide (Mounjaro) 7.5 MG/0.5ML solution pen-injector pen Inject 0.5 mL under the skin into the appropriate area as directed 1 (One) Time Per Week.    Provider, MD Chandler       Allergies:  Empagliflozin    ROS:    General: Weight stable  Resp: No SOA  Cardiovascular: No CP    Objective     Blood pressure 147/80, pulse 82, temperature 97.2 °F (36.2 °C), temperature source Temporal, resp. rate 18, height 162.6 cm (64\"), SpO2 98%, not currently breastfeeding.    Physical Exam   Constitutional: Pt is oriented to person, place, and in no distress.   HENT: Mouth/Throat: Oropharynx is clear.   Cardiovascular: Normal rate, regular rhythm.    Pulmonary/Chest: Effort normal. No respiratory distress. No  wheezes.   Abdominal: Soft. Non-distended.  Skin: Skin is warm and dry.   Psychiatric: Mood, memory, affect and judgment appear normal.     Assessment & Plan     Diagnosis:  Polyps    Anticipated Surgical Procedure:  C-scope    The risks, benefits, and alternatives of this procedure have been discussed with the patient or the responsible party- the patient understands and agrees to proceed.        "

## 2024-05-21 ENCOUNTER — HOSPITAL ENCOUNTER (OUTPATIENT)
Dept: SLEEP CENTER | Age: 64
Discharge: HOME | End: 2024-05-23
Payer: COMMERCIAL

## 2024-05-21 PROCEDURE — G0399 HOME SLEEP TEST/TYPE 3 PORTA: HCPCS

## 2024-05-21 NOTE — PROGRESS NOTES
Ms.Lavangela Rodgers presented today in the sleep center for a Home Sleep Test (HST).  Ms Rodgers was instructed on the device and was requested to wear the unit for two nights.  was asked to have the HST monitor back by 10AM on  05/23/2024. The patient acknowledged he/she understood. The HST device was tested and was in working order.

## 2024-05-22 PROCEDURE — G0399 HOME SLEEP TEST/TYPE 3 PORTA: HCPCS

## 2024-05-31 DIAGNOSIS — G47.33 OSA (OBSTRUCTIVE SLEEP APNEA): Primary | ICD-10-CM

## 2024-06-03 ENCOUNTER — TELEPHONE (OUTPATIENT)
Dept: SLEEP CENTER | Age: 64
End: 2024-06-03

## 2024-06-03 NOTE — TELEPHONE ENCOUNTER
Spoke to Ms.Sabrina Vishal and went over the results of her HST performed 05/21/2024 and 05/22/2024.  Questions answered.  Orders, documentation and insurance information were sent to [facility] today.

## 2024-07-03 NOTE — ANESTHESIA PRE PROCEDURE
Pt's wife calls back informed her that we were calling to  ensure that pt is doing well with new PICC line. Marium states he is doing well. Confirmed with her that abx end date was extended to 7/17 due to missed doses. Also confirmed MRI appt and follow up in our office. Marium verbalizes understanding and gives thanks    GLUCOSE 122 02/16/2023 02:55 PM    PROT 8.0 02/24/2015 10:25 AM    CALCIUM 9.8 02/16/2023 02:55 PM    ALKPHOS 73 02/24/2015 10:25 AM    AST 15 02/24/2015 10:25 AM    ALT 29 02/24/2015 10:25 AM       POC Tests:   No results for input(s): POCGLU, POCNA, POCK, POCCL, POCBUN, POCHEMO, POCHCT in the last 72 hours. Coags:   Lab Results   Component Value Date/Time    PROTIME 12.1 02/16/2023 02:55 PM    INR 0.91 02/16/2023 02:55 PM    APTT 29.4 02/16/2023 02:55 PM       HCG (If Applicable): No results found for: PREGTESTUR, PREGSERUM, HCG, HCGQUANT     ABGs: No results found for: PHART, PO2ART, HXR7RFY, PCW8TRR, BEART, I8WUQSSH     Type & Screen (If Applicable):  No results found for: LABABO, LABRH    Drug/Infectious Status (If Applicable):  No results found for: HIV, HEPCAB    COVID-19 Screening (If Applicable): No results found for: COVID19        Anesthesia Evaluation  Patient summary reviewed no history of anesthetic complications:   Airway: Mallampati: I  TM distance: >3 FB   Neck ROM: full  Mouth opening: > = 3 FB   Dental: normal exam         Pulmonary:normal exam  breath sounds clear to auscultation  (+) sleep apnea: on noncompliant,      (-) asthma, recent URI and not a current smoker          Patient did not smoke on day of surgery. Cardiovascular:  Exercise tolerance: good (>4 METS),   (+) hypertension:,     (-) pacemaker, past MI, CABG/stent and  angina    ECG reviewed  Rhythm: regular  Rate: normal           Beta Blocker:  Not on Beta Blocker         Neuro/Psych:      (-) seizures, TIA and CVA           GI/Hepatic/Renal:   (+) GERD: well controlled,      (-) liver disease and no renal disease       Endo/Other:    (+) DiabetesType II DM, , .    (-) hypothyroidism, hyperthyroidism               Abdominal:             Vascular:     - DVT. Other Findings:             Anesthesia Plan      general and TIVA     ASA 2       Induction: intravenous.     MIPS: Postoperative opioids intended and

## 2024-08-22 ENCOUNTER — HOSPITAL ENCOUNTER (OUTPATIENT)
Dept: WOMENS IMAGING | Age: 64
Discharge: HOME | End: 2024-08-22
Attending: STUDENT IN AN ORGANIZED HEALTH CARE EDUCATION/TRAINING PROGRAM
Payer: COMMERCIAL

## 2024-08-22 DIAGNOSIS — M81.0 AGE-RELATED OSTEOPOROSIS WITHOUT CURRENT PATHOLOGICAL FRACTURE: ICD-10-CM

## 2024-08-22 DIAGNOSIS — E55.9 VITAMIN D DEFICIENCY, UNSPECIFIED: ICD-10-CM

## 2024-08-22 DIAGNOSIS — Z12.31 ENCOUNTER FOR SCREENING MAMMOGRAM FOR MALIGNANT NEOPLASM OF BREAST: ICD-10-CM

## 2024-08-22 PROCEDURE — 77080 DXA BONE DENSITY AXIAL: CPT

## 2024-08-22 PROCEDURE — 77063 BREAST TOMOSYNTHESIS BI: CPT

## 2024-10-08 ENCOUNTER — OFFICE VISIT (OUTPATIENT)
Dept: OBGYN CLINIC | Age: 64
End: 2024-10-08
Payer: COMMERCIAL

## 2024-10-08 VITALS
WEIGHT: 138 LBS | HEART RATE: 91 BPM | BODY MASS INDEX: 23.69 KG/M2 | DIASTOLIC BLOOD PRESSURE: 75 MMHG | SYSTOLIC BLOOD PRESSURE: 113 MMHG

## 2024-10-08 DIAGNOSIS — Z12.31 BREAST CANCER SCREENING BY MAMMOGRAM: ICD-10-CM

## 2024-10-08 DIAGNOSIS — Z90.721 STATUS POST HYSTERECTOMY WITH OOPHORECTOMY: ICD-10-CM

## 2024-10-08 DIAGNOSIS — Z90.710 STATUS POST HYSTERECTOMY WITH OOPHORECTOMY: ICD-10-CM

## 2024-10-08 DIAGNOSIS — Z01.419 WELL WOMAN EXAM WITH ROUTINE GYNECOLOGICAL EXAM: ICD-10-CM

## 2024-10-08 DIAGNOSIS — Z76.89 ENCOUNTER TO ESTABLISH CARE: Primary | ICD-10-CM

## 2024-10-08 DIAGNOSIS — N94.10 DYSPAREUNIA, FEMALE: ICD-10-CM

## 2024-10-08 DIAGNOSIS — N95.2 ATROPHIC VAGINITIS: ICD-10-CM

## 2024-10-08 DIAGNOSIS — Z12.39 ENCOUNTER FOR SCREENING BREAST EXAMINATION: ICD-10-CM

## 2024-10-08 PROCEDURE — 3074F SYST BP LT 130 MM HG: CPT

## 2024-10-08 PROCEDURE — 99386 PREV VISIT NEW AGE 40-64: CPT

## 2024-10-08 PROCEDURE — 3078F DIAST BP <80 MM HG: CPT

## 2024-10-08 RX ORDER — TRAMADOL HYDROCHLORIDE 50 MG/1
50 TABLET ORAL EVERY 6 HOURS PRN
COMMUNITY

## 2024-10-08 ASSESSMENT — ENCOUNTER SYMPTOMS
GASTROINTESTINAL NEGATIVE: 1
CHEST TIGHTNESS: 0
RESPIRATORY NEGATIVE: 1
SHORTNESS OF BREATH: 0
CONSTIPATION: 0
ABDOMINAL PAIN: 0
BACK PAIN: 0
DIARRHEA: 0
NAUSEA: 0
RECTAL PAIN: 0

## 2024-10-08 NOTE — PROGRESS NOTES
Pt presents today for new patient visit and pap smear and breast exam.    Mammo:24  Pap smear:  Contraception:Hyst-still has ovaries    P:2  Ab:0  Bone density:24  Colonoscopy:

## 2024-10-08 NOTE — PROGRESS NOTES
[facility] [place] OB/GYN  CNM Office Note    Sabrina Rodgers is a 64 y.o. female who presents today for her medical conditions/ complaints as noted below.  Chief Complaint   Patient presents with    New Patient    Annual Exam     Mammo:24  Pap smear:  Contraception:Hyst-still has ovaries    P:2  Ab:0  Bone density:24  Colonoscopy:    HPI  Sabrina Rodgers presents today to establish care and for annual exam. She denies history of abnormal pap smears and denies history of abnormal mammograms. She denies family history of breast cancer. She is postmenopausal and has had hysterectomy with bladder sling. She retained her ovaries. On , she had some bright red bleeding in the toilet after voiding. She feels like her bladder control has weakened over the past 6 months or so. She denies current issues with bowel. She has had hip and knees replaced. She feels her sleep pattern and quality is Fair.      Problems/Complaints today:  1. Encounter to establish care  2. Well woman exam with routine gynecological exam  3. Encounter for screening breast examination  4. Status post hysterectomy with oophorectomy  5. Breast cancer screening by mammogram  -     ALEXANDRA DIGITAL SCREEN W OR WO CAD BILATERAL; Future  6. Atrophic vaginitis  7. Dyspareunia, female       Patient Active Problem List   Diagnosis    Elevated LFTs    Primary hypertension    Pure hypercholesterolemia    Gastroesophageal reflux disease without esophagitis    Drug-induced constipation    Type 2 diabetes mellitus with hyperglycemia, without long-term current use of insulin (HCC)       No LMP recorded (lmp unknown). Patient has had a hysterectomy.      Past Medical History:   Diagnosis Date    Arthritis     Colon polyps     Diabetes (HCC)     High cholesterol     Hip pain     HPV (human papilloma virus) anogenital infection 2017    Hypertension     Knee pain     Left bundle branch block     Sleep apnea      Past

## 2024-10-11 DIAGNOSIS — M25.561 RIGHT KNEE PAIN, UNSPECIFIED CHRONICITY: Primary | ICD-10-CM

## 2024-10-14 ENCOUNTER — OFFICE VISIT (OUTPATIENT)
Age: 64
End: 2024-10-14
Payer: COMMERCIAL

## 2024-10-14 VITALS — BODY MASS INDEX: 23.42 KG/M2 | HEIGHT: 64 IN | WEIGHT: 137.2 LBS

## 2024-10-14 DIAGNOSIS — Z96.651 STATUS POST RIGHT KNEE REPLACEMENT: Primary | ICD-10-CM

## 2024-10-14 DIAGNOSIS — Z96.652 STATUS POST LEFT KNEE REPLACEMENT: ICD-10-CM

## 2024-10-14 DIAGNOSIS — Z96.642 STATUS POST LEFT HIP REPLACEMENT: ICD-10-CM

## 2024-10-14 PROCEDURE — 99213 OFFICE O/P EST LOW 20 MIN: CPT | Performed by: ORTHOPAEDIC SURGERY

## 2024-10-14 NOTE — PROGRESS NOTES
Nontender about the left hip.    Xray: XR KNEE RIGHT (MIN 4 VIEWS)  X-rays of the left knee (AP, lateral, 45 degree PA weightbearing,   patellofemoral views) show a left cemented, posterior stabilized knee   arthroplasty in expected position.  No radiolucent lines.  No fracture.    Patellofemoral joint well aligned.  Right knee is also been replaced.    Implants in good position in the right knee.         Assessment and Plan:     Assessment & Plan  Status post right knee replacement   Chronic, at goal (stable), continue current treatment plan         Status post left knee replacement   Chronic, at goal (stable), continue current treatment plan         Status post left hip replacement   Chronic, at goal (stable), continue current treatment plan  I think overall her replacements are doing well.  The weakness and achiness she gets in her lower extremities may be due to lumbar neurogenic claudication.  She does admit to some low back pain.  Her knees are little on the stiff side.  I would like to just watch things.  See her back in a year with x-rays of her left hip and both knees.              No follow-ups on file.           Patient given educational materials - see patient instructions.   Discussed use, benefit, and side effects of prescribed medications.  All patient questions answered.  Pt voiced understanding. Patient agreed with treatment plan. Follow up as needed.      Electronically signed by KATH YUN MD on 10/14/2024 at 10:07 AM.

## 2024-10-14 NOTE — ASSESSMENT & PLAN NOTE
Chronic, at goal (stable), continue current treatment plan  I think overall her replacements are doing well.  The weakness and achiness she gets in her lower extremities may be due to lumbar neurogenic claudication.  She does admit to some low back pain.  Her knees are little on the stiff side.  I would like to just watch things.  See her back in a year with x-rays of her left hip and both knees.

## 2024-10-24 ENCOUNTER — OFFICE VISIT (OUTPATIENT)
Dept: PULMONOLOGY | Age: 64
End: 2024-10-24
Payer: COMMERCIAL

## 2024-10-24 VITALS
DIASTOLIC BLOOD PRESSURE: 71 MMHG | SYSTOLIC BLOOD PRESSURE: 120 MMHG | OXYGEN SATURATION: 98 % | TEMPERATURE: 98.6 F | WEIGHT: 139.4 LBS | HEART RATE: 92 BPM | RESPIRATION RATE: 20 BRPM | HEIGHT: 64 IN | BODY MASS INDEX: 23.8 KG/M2

## 2024-10-24 DIAGNOSIS — R06.83 SNORING: ICD-10-CM

## 2024-10-24 DIAGNOSIS — G47.10 HYPERSOMNIA: ICD-10-CM

## 2024-10-24 DIAGNOSIS — G47.33 MILD OBSTRUCTIVE SLEEP APNEA: Primary | ICD-10-CM

## 2024-10-24 DIAGNOSIS — G47.8 NON-RESTORATIVE SLEEP: ICD-10-CM

## 2024-10-24 LAB
T4 FREE SERPL-MCNC: 1.27 NG/DL (ref 0.93–1.7)
TSH SERPL DL<=0.005 MIU/L-ACNC: 0.7 UIU/ML (ref 0.27–4.2)

## 2024-10-24 PROCEDURE — 3074F SYST BP LT 130 MM HG: CPT | Performed by: INTERNAL MEDICINE

## 2024-10-24 PROCEDURE — 3078F DIAST BP <80 MM HG: CPT | Performed by: INTERNAL MEDICINE

## 2024-10-24 PROCEDURE — 99204 OFFICE O/P NEW MOD 45 MIN: CPT | Performed by: INTERNAL MEDICINE

## 2024-10-24 RX ORDER — GABAPENTIN 300 MG/1
300 CAPSULE ORAL 3 TIMES DAILY
COMMUNITY

## 2024-10-24 ASSESSMENT — ENCOUNTER SYMPTOMS
CHEST TIGHTNESS: 0
RHINORRHEA: 0
ABDOMINAL PAIN: 0
APNEA: 1
SHORTNESS OF BREATH: 0
ABDOMINAL DISTENTION: 0
COUGH: 0
WHEEZING: 0
BACK PAIN: 0
ANAL BLEEDING: 0

## 2024-10-24 NOTE — PROGRESS NOTES
apnea. Negative for cough, chest tightness, shortness of breath and wheezing.    Gastrointestinal:  Negative for abdominal distention, abdominal pain and anal bleeding.   Endocrine: Negative for cold intolerance, heat intolerance and polydipsia.   Genitourinary:  Negative for difficulty urinating, dysuria, enuresis and flank pain.   Musculoskeletal:  Negative for arthralgias, back pain and gait problem.   Allergic/Immunologic: Negative for environmental allergies.   Neurological:  Negative for dizziness, facial asymmetry, light-headedness and headaches.       Vitals:    10/24/24 1255   BP: 120/71   Pulse: 92   Resp: 20   Temp: 98.6 °F (37 °C)   SpO2: 98%   Weight: 63.2 kg (139 lb 6.4 oz)   Height: 1.626 m (5' 4\")      BMI Readings from Last 1 Encounters:   10/24/24 23.93 kg/m²         Physical Exam  Vitals reviewed.   Constitutional:       Appearance: Normal appearance.   HENT:      Head: Normocephalic and atraumatic.      Nose: Nose normal.   Eyes:      Extraocular Movements: Extraocular movements intact.      Conjunctiva/sclera: Conjunctivae normal.   Cardiovascular:      Rate and Rhythm: Normal rate and regular rhythm.      Heart sounds: No murmur heard.     No friction rub.   Pulmonary:      Effort: Pulmonary effort is normal. No respiratory distress.      Breath sounds: Normal breath sounds. No stridor. No wheezing, rhonchi or rales.   Abdominal:      General: There is no distension.      Palpations: There is no mass.      Tenderness: There is no abdominal tenderness. There is no guarding or rebound.   Musculoskeletal:      Cervical back: Normal range of motion and neck supple.   Neurological:      Mental Status: She is alert and oriented to person, place, and time.             This note was generated using a voice recognition software. Errors in voice recognition may have occurred.      An electronic signature was used to authenticate this note.    --Rox Watkins MD

## 2024-12-06 ENCOUNTER — TELEPHONE (OUTPATIENT)
Age: 64
End: 2024-12-06

## 2024-12-11 ENCOUNTER — OFFICE VISIT (OUTPATIENT)
Dept: PULMONOLOGY | Age: 64
End: 2024-12-11

## 2024-12-11 VITALS
HEART RATE: 101 BPM | TEMPERATURE: 98.3 F | HEIGHT: 64 IN | WEIGHT: 131.8 LBS | BODY MASS INDEX: 22.5 KG/M2 | DIASTOLIC BLOOD PRESSURE: 74 MMHG | SYSTOLIC BLOOD PRESSURE: 123 MMHG | RESPIRATION RATE: 20 BRPM | OXYGEN SATURATION: 98 %

## 2024-12-11 DIAGNOSIS — G47.8 NON-RESTORATIVE SLEEP: ICD-10-CM

## 2024-12-11 DIAGNOSIS — G47.33 MILD OBSTRUCTIVE SLEEP APNEA: Primary | ICD-10-CM

## 2024-12-11 DIAGNOSIS — Z78.9 NEVER SMOKED CIGARETTES: ICD-10-CM

## 2024-12-11 DIAGNOSIS — G47.10 HYPERSOMNIA: ICD-10-CM

## 2024-12-11 DIAGNOSIS — R06.02 SHORTNESS OF BREATH: ICD-10-CM

## 2024-12-11 DIAGNOSIS — R06.83 SNORING: ICD-10-CM

## 2024-12-11 LAB
DISTANCE WALKED: 800 FT
SPO2: 95 %

## 2024-12-11 RX ORDER — MODAFINIL 100 MG/1
100 TABLET ORAL DAILY
Qty: 30 TABLET | Refills: 3 | Status: SHIPPED | OUTPATIENT
Start: 2024-12-11 | End: 2025-04-10

## 2024-12-11 RX ORDER — ALENDRONATE SODIUM 70 MG/1
TABLET ORAL
COMMUNITY
Start: 2024-11-15

## 2024-12-11 ASSESSMENT — ENCOUNTER SYMPTOMS
CHEST TIGHTNESS: 0
SHORTNESS OF BREATH: 1
ABDOMINAL PAIN: 0
APNEA: 1
COUGH: 0
ABDOMINAL DISTENTION: 0
BACK PAIN: 0
ANAL BLEEDING: 0
RHINORRHEA: 0
WHEEZING: 0

## 2024-12-11 NOTE — PROGRESS NOTES
Pulmonary and Sleep Medicine    Sabrina Rodgers (:  1960) is a 64 y.o. female,Established patient, here for evaluation of the following chief complaint(s):  Follow-up (4 week follow up for EVELYNE/DME uploaded on 2024. Pt is now non-compliant has not worn CPAP in a few weeks. She states that she wakes up with drool in her mask.)      Referring physician:  No referring provider defined for this encounter.     ASSESSMENT/PLAN:  1. Mild obstructive sleep apnea. AHI 12 on HST done 2024  2. Hypersomnia  -     modafinil (PROVIGIL) 100 MG tablet; Take 1 tablet by mouth daily for 120 days. Max Daily Amount: 100 mg, Disp-30 tablet, R-3Normal  3. Non-restorative sleep  4. Snoring  5. Shortness of breath  -     Full PFT Study With Bronchodilator; Future  -     6 Minute Walk Test  -     Exercise stress test; Future  -     Echo (TTE) complete (PRN contrast/bubble/strain/3D); Future  -     XR CHEST (2 VW); Future  6. Never smoked cigarettes        Continue current management with the CPAP.  We discussed different techniques to minimize the CPAP mask leak and discomfort.  We will start her on modafinil for her residual hypersomnia despite using the CPAP.  Will obtain pulmonary function testing and 6-minute walk today.  Exercise stress test and echo to assess for the etiology of her shortness of breath.  She says she gets short of breath just walking to the bed and trying to put her CPAP mask on.  Chest x-ray.       Rox Watkins MD, [facility], [facility]    Return in about 4 weeks (around 2025).    SUBJECTIVE/OBJECTIVE:        Patient is here for follow-up on obstructive sleep apnea.  She continues to be short of breath with exertion.  She is using her CPAP on average about 4 hours a night.  Her apnea-hypopnea index while on CPAP is 2 events per hour.  Her leak has improved significantly with a fullface interface however she continues to have difficulty with keeping the mask on with

## 2024-12-17 ENCOUNTER — OFFICE VISIT (OUTPATIENT)
Age: 64
End: 2024-12-17
Payer: COMMERCIAL

## 2024-12-17 VITALS
DIASTOLIC BLOOD PRESSURE: 78 MMHG | OXYGEN SATURATION: 99 % | BODY MASS INDEX: 23.56 KG/M2 | HEART RATE: 94 BPM | WEIGHT: 138 LBS | RESPIRATION RATE: 16 BRPM | HEIGHT: 64 IN | SYSTOLIC BLOOD PRESSURE: 122 MMHG

## 2024-12-17 DIAGNOSIS — E11.9 ENCOUNTER FOR DIABETIC FOOT EXAM: ICD-10-CM

## 2024-12-17 DIAGNOSIS — E11.9 CONTROLLED TYPE 2 DIABETES MELLITUS WITHOUT COMPLICATION, WITHOUT LONG-TERM CURRENT USE OF INSULIN: ICD-10-CM

## 2024-12-17 DIAGNOSIS — M79.671 FOOT PAIN, RIGHT: ICD-10-CM

## 2024-12-17 DIAGNOSIS — L60.0 INGROWN TOENAIL: Primary | ICD-10-CM

## 2024-12-17 PROBLEM — E78.5 HYPERLIPIDEMIA: Status: ACTIVE | Noted: 2023-10-12

## 2024-12-17 PROBLEM — N18.9 CHRONIC KIDNEY DISEASE: Status: ACTIVE | Noted: 2024-08-09

## 2024-12-17 PROBLEM — M81.0 OSTEOPOROSIS: Status: ACTIVE | Noted: 2023-10-12

## 2024-12-17 PROBLEM — H81.09 MENIERE'S DISEASE: Status: ACTIVE | Noted: 2023-10-12

## 2024-12-17 PROBLEM — E11.65 TYPE 2 DIABETES MELLITUS WITH HYPERGLYCEMIA, WITHOUT LONG-TERM CURRENT USE OF INSULIN: Chronic | Status: ACTIVE | Noted: 2023-03-09

## 2024-12-17 PROBLEM — F32.9 MAJOR DEPRESSIVE DISORDER: Status: ACTIVE | Noted: 2023-10-12

## 2024-12-17 PROBLEM — M19.90 OSTEOARTHRITIS: Status: ACTIVE | Noted: 2023-10-12

## 2024-12-17 PROBLEM — K21.9 GASTROESOPHAGEAL REFLUX DISEASE WITHOUT ESOPHAGITIS: Chronic | Status: ACTIVE | Noted: 2023-03-09

## 2024-12-17 PROBLEM — I10 ESSENTIAL HYPERTENSION: Chronic | Status: ACTIVE | Noted: 2023-03-09

## 2024-12-17 RX ORDER — TRAMADOL HYDROCHLORIDE 50 MG/1
50 TABLET ORAL EVERY 6 HOURS PRN
COMMUNITY

## 2024-12-17 NOTE — PROGRESS NOTES
[facility] - PODIATRY    Today's Date: 12/17/2024     Patient Name: Martha Parra  MRN: [id number]  CSN: [id number]  PCP: Ruperto Hendricks MD  Referring Provider: Sandro Pineda APRN    SUBJECTIVE     Chief Complaint   Patient presents with    Establish Care     Ruperto Hendricks MD-12/02/2024-Other nail disorders - NO INJURY - NO IMG - NO SX     HPI: Martha Parra, a 64 y.o.female, comes to clinic as a(n) new patient presenting for diabetic foot exam and complaining of ingrown toenail. Patient has h/o DM2, arthritis, HLD, HTN, sleep apnea . Patient is NIDDM and unsure of last BG level.  Denies significant numbness in her feet.  Denies open wounds or sores.  Relates that her right great toenail is somewhat ingrown on the medial border.  Relates tenderness when pressed.  Denies redness or drainage.  Admits pain at 1/10 level and described as aching and dull. Denies previous treatment. Denies any constitutional symptoms. No other pedal complaints at this time.    Past Medical History:   Diagnosis Date    Anemia     Arthritis     Diabetes     Hyperlipidemia     Hypertension     Sleep apnea     does not wear machine     Past Surgical History:   Procedure Laterality Date    COLONOSCOPY N/A 04/18/2018    Procedure: COLONOSCOPY WITH ANESTHESIA;  Surgeon: Chandler Farris DO;  Location: [facility] ENDOSCOPY;  Service: Gastroenterology    COLONOSCOPY N/A 2/16/2024    Procedure: COLONOSCOPY WITH ANESTHESIA;  Surgeon: Chandler Farris DO;  Location: [facility] ENDOSCOPY;  Service: Gastroenterology;  Laterality: N/A;  pre anemia  post normal  Dr. Wyatt    COLONOSCOPY N/A 3/25/2024    Procedure: COLONOSCOPY WITH ANESTHESIA;  Surgeon: Chandler Farris DO;  Location: [facility] ENDOSCOPY;  Service: Gastroenterology;  Laterality: N/A;  pre anemia  post normal  Dr. Wyatt    COLONOSCOPY W/ POLYPECTOMY  03/28/2014    several rectal polyps which were fairly nonworrisome and all removed     ENDOSCOPY N/A 2/16/2024    Procedure: ESOPHAGOGASTRODUODENOSCOPY WITH ANESTHESIA;  Surgeon: Chandler Farris DO;  Location: [facility] ENDOSCOPY;  Service: Gastroenterology;  Laterality: N/A;  pre anemia  post normal  Dr. Wyatt    HYSTERECTOMY      TUBAL ABDOMINAL LIGATION       Family History   Problem Relation Age of Onset    Colon cancer Mother     Heart disease Father     No Known Problems Sister     Colon cancer Maternal Aunt     No Known Problems Daughter     No Known Problems Son     Colon polyps Neg Hx     Esophageal cancer Neg Hx      Social History     Socioeconomic History    Marital status:    Tobacco Use    Smoking status: Never    Smokeless tobacco: Never   Vaping Use    Vaping status: Never Used   Substance and Sexual Activity    Alcohol use: No    Drug use: No    Sexual activity: Defer     Allergies   Allergen Reactions    Empagliflozin Other (See Comments)     Caused yeast infection.     Current Outpatient Medications   Medication Sig Dispense Refill    lisinopril-hydrochlorothiazide (PRINZIDE,ZESTORETIC) 20-12.5 MG per tablet Take 1 tablet by mouth Daily.      meloxicam (MOBIC) 15 MG tablet Take 1 tablet by mouth Daily.      metFORMIN (GLUCOPHAGE) 1000 MG tablet Take 1 tablet by mouth Every 12 (Twelve) Hours.      rosuvastatin (CRESTOR) 5 MG tablet Take 1 tablet by mouth Daily. (Patient taking differently: Take 4 tablets by mouth Daily.)      Tirzepatide (Mounjaro) 7.5 MG/0.5ML solution pen-injector pen Inject 0.5 mL under the skin into the appropriate area as directed 1 (One) Time Per Week.      traMADol (ULTRAM) 50 MG tablet Take 1 tablet by mouth Every 6 (Six) Hours As Needed for Moderate Pain.      Alendronate Sodium 70 MG effervescent tablet Take 1 tablet by mouth 1 (One) Time Per Week.      ezetimibe (Zetia) 10 MG tablet Take 1 tablet by mouth Daily. (Patient not taking: Reported on 12/17/2024)       No current facility-administered medications for this visit.     Review of Systems    Constitutional:  Negative for chills and fever.   HENT:  Negative for congestion.    Respiratory:  Negative for shortness of breath.    Cardiovascular:  Negative for chest pain and leg swelling.   Gastrointestinal:  Negative for constipation, diarrhea, nausea and vomiting.   Musculoskeletal:  Positive for arthralgias.   Skin:  Negative for wound.   Neurological:  Negative for numbness.   Psychiatric/Behavioral:  Negative for agitation.        OBJECTIVE     Vitals:    12/17/24 1531   BP: 122/78   Pulse: 94   Resp: 16   SpO2: 99%       PHYSICAL EXAM  GEN:   Accompanied by none.     Foot/Ankle Exam    GENERAL  Appearance:  appears stated age  Orientation:  AAOx3  Affect:  appropriate  Gait:  unimpaired  Assistance:  independent  Right shoe gear: casual shoe  Left shoe gear: casual shoe    VASCULAR     Right Foot Vascularity   Dorsalis pedis:  2+  Posterior tibial:  2+  Skin temperature:  warm  Edema grading:  None  CFT:  3  Pedal hair growth:  Present  Varicosities:  none     Left Foot Vascularity   Dorsalis pedis:  2+  Posterior tibial:  2+  Skin temperature:  warm  Edema grading:  None  CFT:  3  Pedal hair growth:  Present  Varicosities:  none     NEUROLOGIC     Right Foot Neurologic   Light touch sensation: diminished  Vibratory sensation: normal  Hot/Cold sensation: normal  Protective Sensation using [place] Monofilament:   Sites intact: 10  Sites tested: 10     Left Foot Neurologic   Light touch sensation: diminished  Vibratory sensation: normal  Hot/Cold sensation:  normal  Protective Sensation using [place] Monofilament:   Sites intact: 10  Sites tested: 10    MUSCULOSKELETAL     Right Foot Musculoskeletal   Ecchymosis:  none  Tenderness:  toenail problem    Arch:  Normal  Hallux valgus: Yes       Left Foot Musculoskeletal   Ecchymosis:  none  Tenderness:  none  Arch:  Normal    MUSCLE STRENGTH     Right Foot Muscle Strength   Foot dorsiflexion:  5  Foot plantar flexion:  5  Foot inversion:   5  Foot eversion:  5     Left Foot Muscle Strength   Foot dorsiflexion:  5  Foot plantar flexion:  5  Foot inversion:  5  Foot eversion:  5    RANGE OF MOTION     Right Foot Range of Motion   Foot and ankle ROM within normal limits       Left Foot Range of Motion   Foot and ankle ROM within normal limits      DERMATOLOGIC      Right Foot Dermatologic   Skin  Right foot skin is intact.   Nails  1.  Positive for ingrown toenail (medial border).     Left Foot Dermatologic   Skin  Left foot skin is intact.       RADIOLOGY/NUCLEAR:  No results found.    LABORATORY/CULTURE RESULTS:      PATHOLOGY RESULTS:       ASSESSMENT/PLAN     Diagnoses and all orders for this visit:    1. Ingrown toenail (Primary)    2. Foot pain, right    3. Controlled type 2 diabetes mellitus without complication, without long-term current use of insulin    4. Encounter for diabetic foot exam      Comprehensive lower extremity examination and evaluation was performed.  Discussed findings and treatment plan including risks, benefits, and treatment options with patient in detail. Patient agreed with treatment plan.  DFE performed  After verbal consent obtained, nail(s) x1 debrided of offending borders with nail nipper without incidence  Patient may maintain nails and calluses at home utilizing emery board or pumice stone between visits as needed  Reviewed at home diabetic foot care including daily foot checks   Continue blood glucose control under management by PCP.  An After Visit Summary was printed and given to the patient at discharge, including (if requested) any available informative/educational handouts regarding diagnosis, treatment, or medications. All questions were answered to patient/family satisfaction. Should symptoms fail to improve or worsen they agree to call or return to clinic or to go to the Emergency Department. Discussed the importance of following up with any needed screening tests/labs/specialist appointments and any requested  follow-up recommended by me today. Importance of maintaining follow-up discussed and patient accepts that missed appointments can delay diagnosis and potentially lead to worsening of conditions.  Return in about 1 year (around 12/17/2025) for Follow-up with Podiatry APRN, Diabetic Foot Exam., or sooner if acute issues arise.      This document has been electronically signed by Kraig Wynn DPM on December 17, 2024 15:59 CST

## 2024-12-20 ENCOUNTER — TELEPHONE (OUTPATIENT)
Age: 64
End: 2024-12-20

## 2024-12-31 ENCOUNTER — HOSPITAL ENCOUNTER (OUTPATIENT)
Dept: GENERAL RADIOLOGY | Age: 64
Discharge: HOME | End: 2024-12-31
Payer: COMMERCIAL

## 2024-12-31 DIAGNOSIS — R06.02 SHORTNESS OF BREATH: ICD-10-CM

## 2024-12-31 PROCEDURE — 71046 X-RAY EXAM CHEST 2 VIEWS: CPT

## 2025-01-08 ENCOUNTER — OFFICE VISIT (OUTPATIENT)
Dept: PULMONOLOGY | Age: 65
End: 2025-01-08
Payer: COMMERCIAL

## 2025-01-08 VITALS
HEART RATE: 98 BPM | RESPIRATION RATE: 20 BRPM | TEMPERATURE: 98.6 F | BODY MASS INDEX: 22.33 KG/M2 | HEIGHT: 64 IN | WEIGHT: 130.8 LBS | OXYGEN SATURATION: 99 % | DIASTOLIC BLOOD PRESSURE: 81 MMHG | SYSTOLIC BLOOD PRESSURE: 121 MMHG

## 2025-01-08 DIAGNOSIS — R06.83 SNORING: ICD-10-CM

## 2025-01-08 DIAGNOSIS — G47.33 MILD OBSTRUCTIVE SLEEP APNEA: Primary | ICD-10-CM

## 2025-01-08 DIAGNOSIS — G47.10 HYPERSOMNIA: ICD-10-CM

## 2025-01-08 DIAGNOSIS — G47.8 NON-RESTORATIVE SLEEP: ICD-10-CM

## 2025-01-08 DIAGNOSIS — R06.02 SHORTNESS OF BREATH: ICD-10-CM

## 2025-01-08 PROCEDURE — 3079F DIAST BP 80-89 MM HG: CPT | Performed by: INTERNAL MEDICINE

## 2025-01-08 PROCEDURE — 3074F SYST BP LT 130 MM HG: CPT | Performed by: INTERNAL MEDICINE

## 2025-01-08 PROCEDURE — 99214 OFFICE O/P EST MOD 30 MIN: CPT | Performed by: INTERNAL MEDICINE

## 2025-01-08 RX ORDER — COLESEVELAM 180 1/1
TABLET ORAL
COMMUNITY

## 2025-01-08 RX ORDER — ATORVASTATIN CALCIUM 10 MG/1
1 TABLET, FILM COATED ORAL
COMMUNITY

## 2025-01-08 ASSESSMENT — ENCOUNTER SYMPTOMS
RHINORRHEA: 0
CHEST TIGHTNESS: 0
SHORTNESS OF BREATH: 1
ABDOMINAL PAIN: 0
ANAL BLEEDING: 0
APNEA: 0
BACK PAIN: 0
COUGH: 0
WHEEZING: 0
ABDOMINAL DISTENTION: 0

## 2025-01-08 NOTE — PROGRESS NOTES
Pulmonary and Sleep Medicine    Sabrina Rodgers (:  1960) is a 64 y.o. female,Established patient, here for evaluation of the following chief complaint(s):  Follow-up (4 week follow up- /PFT not scheduled /ECHO, Stress test not completed /2024 CXR completed /Pt states that she was confused on what to have done. /DME compliance report uploaded on 1/3/2025)      Referring physician:  No referring provider defined for this encounter.     ASSESSMENT/PLAN:  1. Mild obstructive sleep apnea. AHI 12 on HST done 2024  2. Hypersomnia  3. Non-restorative sleep  4. Snoring  5. Shortness of breath        Will reschedule her PFT and cardiac ischemic workup.  Follow-up after the above.  Continue CPAP she is compliant with the CPAP she feels the CPAP helps.       Rox Watkins MD, [facility], [facility]    No follow-ups on file.    SUBJECTIVE/OBJECTIVE:        Patient is here for follow-up on shortness of breath and sleep apnea.  Her CPAP download data was reviewed.  My interpretation of the download is that she is using the CPAP on average about 7 hours a night.  Her apnea-hypopnea index while on the CPAP is less than 5 events per hour.  She had a chest x-ray done.  No official interpretation however my interpretation of the chest x-ray is that it is within normal limits.  Cardiac ischemic workup and PFT were not done.          Continue the following medications as reported by the patient:    Prior to Visit Medications    Medication Sig Taking? Authorizing Provider   atorvastatin (LIPITOR) 10 MG tablet Take 1 tablet by mouth Every Day Yes Melissa Kenny MD   colesevelam (WELCHOL) 625 MG tablet 2 tablets Orally Twice a day Yes Melissa Kenny MD   alendronate (FOSAMAX) 70 MG tablet TAKE 1 TABLET BY MOUTH EVERY WEEK Yes Melissa Kenny MD   modafinil (PROVIGIL) 100 MG tablet Take 1 tablet by mouth daily for 120 days. Max Daily Amount: 100 mg Yes Rox Watkins MD   gabapentin

## 2025-01-23 ENCOUNTER — HOSPITAL ENCOUNTER (OUTPATIENT)
Age: 65
End: 2025-01-23
Attending: INTERNAL MEDICINE
Payer: COMMERCIAL

## 2025-01-23 ENCOUNTER — HOSPITAL ENCOUNTER (OUTPATIENT)
Age: 65
Discharge: HOME | End: 2025-01-23
Attending: INTERNAL MEDICINE
Payer: COMMERCIAL

## 2025-01-23 ENCOUNTER — HOSPITAL ENCOUNTER (OUTPATIENT)
Dept: PULMONOLOGY | Age: 65
Discharge: HOME | End: 2025-01-23
Attending: INTERNAL MEDICINE
Payer: COMMERCIAL

## 2025-01-23 VITALS
SYSTOLIC BLOOD PRESSURE: 121 MMHG | BODY MASS INDEX: 22.53 KG/M2 | WEIGHT: 132 LBS | HEIGHT: 64 IN | DIASTOLIC BLOOD PRESSURE: 81 MMHG

## 2025-01-23 DIAGNOSIS — R06.02 SHORTNESS OF BREATH: ICD-10-CM

## 2025-01-23 DIAGNOSIS — R06.02 SHORTNESS OF BREATH: Primary | ICD-10-CM

## 2025-01-23 LAB
ECHO AO ASC DIAM: 2.3 CM
ECHO AO ROOT DIAM: 1.8 CM
ECHO AO SINUS VALSALVA DIAM: 2.3 CM
ECHO AO ST JNCT DIAM: 1.8 CM
ECHO AV AREA PEAK VELOCITY: 2.9 CM2
ECHO AV AREA VTI: 2.7 CM2
ECHO AV MEAN GRADIENT: 5 MMHG
ECHO AV MEAN VELOCITY: 1.1 M/S
ECHO AV PEAK GRADIENT: 8 MMHG
ECHO AV PEAK VELOCITY: 1.4 M/S
ECHO AV VELOCITY RATIO: 1
ECHO AV VTI: 28.5 CM
ECHO BSA: 1.64 M2
ECHO EST RA PRESSURE: 3 MMHG
ECHO IVC PROX: 1.1 CM
ECHO LA AREA 2C: 10.6 CM2
ECHO LA AREA 4C: 9.6 CM2
ECHO LA DIAMETER: 2.7 CM
ECHO LA MAJOR AXIS: 4.5 CM
ECHO LA MINOR AXIS: 4.9 CM
ECHO LA TO AORTIC ROOT RATIO: 1.5
ECHO LA VOL BP: 19 ML (ref 22–52)
ECHO LA VOL MOD A2C: 19 ML (ref 22–52)
ECHO LA VOL MOD A4C: 17 ML (ref 22–52)
ECHO LV E' LATERAL VELOCITY: 6.2 CM/S
ECHO LV E' SEPTAL VELOCITY: 5.55 CM/S
ECHO LV EDV A2C: 54 ML
ECHO LV EDV A4C: 64 ML
ECHO LV EJECTION FRACTION A2C: 55 %
ECHO LV EJECTION FRACTION A4C: 51 %
ECHO LV EJECTION FRACTION BIPLANE: 55 % (ref 55–100)
ECHO LV ESV A2C: 24 ML
ECHO LV ESV A4C: 31 ML
ECHO LV FRACTIONAL SHORTENING: 30 % (ref 28–44)
ECHO LV GLOBAL LONGITUDINAL STRAIN (GLS): -17.9 %
ECHO LV INTERNAL DIMENSION DIASTOLIC: 4 CM (ref 3.9–5.3)
ECHO LV INTERNAL DIMENSION SYSTOLIC: 2.8 CM
ECHO LV IVSD: 1.1 CM (ref 0.6–0.9)
ECHO LV MASS 2D: 127.1 G (ref 67–162)
ECHO LV POSTERIOR WALL DIASTOLIC: 0.9 CM (ref 0.6–0.9)
ECHO LV RELATIVE WALL THICKNESS RATIO: 0.45
ECHO LVOT AREA: 2.8 CM2
ECHO LVOT AV VTI INDEX: 0.93
ECHO LVOT DIAM: 1.9 CM
ECHO LVOT MEAN GRADIENT: 4 MMHG
ECHO LVOT PEAK GRADIENT: 8 MMHG
ECHO LVOT PEAK VELOCITY: 1.4 M/S
ECHO LVOT SV: 75.4 ML
ECHO LVOT VTI: 26.6 CM
ECHO MV A VELOCITY: 0.76 M/S
ECHO MV AREA VTI: 3.6 CM2
ECHO MV E DECELERATION TIME (DT): 261 MS
ECHO MV E VELOCITY: 0.69 M/S
ECHO MV E/A RATIO: 0.91
ECHO MV E/E' LATERAL: 11.13
ECHO MV E/E' RATIO (AVERAGED): 11.78
ECHO MV E/E' SEPTAL: 12.43
ECHO MV LVOT VTI INDEX: 0.79
ECHO MV MAX VELOCITY: 1 M/S
ECHO MV MEAN GRADIENT: 2 MMHG
ECHO MV MEAN VELOCITY: 0.7 M/S
ECHO MV PEAK GRADIENT: 4 MMHG
ECHO MV VTI: 21.1 CM
ECHO PULMONARY ARTERY END DIASTOLIC PRESSURE: 7 MMHG
ECHO PV REGURGITANT MAX VELOCITY: 1.3 M/S
ECHO RA AREA 4C: 6 CM2
ECHO RA VOLUME: 9 ML
ECHO RIGHT VENTRICULAR SYSTOLIC PRESSURE (RVSP): 37 MMHG
ECHO RV BASAL DIMENSION: 2.3 CM
ECHO RV INTERNAL DIMENSION: 2.3 CM
ECHO RV LONGITUDINAL DIMENSION: 5.3 CM
ECHO RV MID DIMENSION: 1.4 CM
ECHO RV TAPSE: 1.7 CM (ref 1.7–?)
ECHO TV REGURGITANT MAX VELOCITY: 2.92 M/S
ECHO TV REGURGITANT PEAK GRADIENT: 34 MMHG
STRESS BASELINE DIAS BP: 77 MMHG
STRESS BASELINE HR: 91 BPM
STRESS BASELINE SYS BP: 146 MMHG
STRESS ESTIMATED WORKLOAD: 1 METS
STRESS EXERCISE DUR MIN: 6 MIN
STRESS EXERCISE DUR SEC: 31 SEC
STRESS O2 SAT PEAK: 98 %
STRESS O2 SAT REST: 98 %
STRESS PEAK DIAS BP: 121 MMHG
STRESS PEAK SYS BP: 163 MMHG
STRESS PERCENT HR ACHIEVED: 90 %
STRESS POST PEAK HR: 141 BPM
STRESS RATE PRESSURE PRODUCT: NORMAL BPM*MMHG
STRESS STAGE 1 BP: NORMAL MMHG
STRESS STAGE 1 COMMENTS: NORMAL
STRESS STAGE 1 DURATION: 2 MIN:SEC
STRESS STAGE 1 HR: 88 BPM
STRESS STAGE 2 BP: NORMAL MMHG
STRESS STAGE 2 COMMENTS: NORMAL
STRESS STAGE 2 DURATION: 2 MIN:SEC
STRESS STAGE 2 HR: 101 BPM
STRESS STAGE 3 BP: NORMAL MMHG
STRESS STAGE 3 DURATION: 2 MIN:SEC
STRESS STAGE 3 HR: 129 BPM
STRESS STAGE 4 DURATION: NORMAL MIN:SEC
STRESS STAGE 4 HR: 137 BPM
STRESS STAGE RECOVERY 1 BP: NORMAL MMHG
STRESS STAGE RECOVERY 1 COMMENTS: NORMAL
STRESS STAGE RECOVERY 1 DURATION: 923 MIN:SEC
STRESS STAGE RECOVERY 1 HR: 96 BPM
STRESS TARGET HR: 156 BPM

## 2025-01-23 PROCEDURE — 93017 CV STRESS TEST TRACING ONLY: CPT

## 2025-01-23 PROCEDURE — 93306 TTE W/DOPPLER COMPLETE: CPT

## 2025-01-23 PROCEDURE — 6360000004 HC RX CONTRAST MEDICATION: Performed by: INTERNAL MEDICINE

## 2025-01-23 PROCEDURE — 6360000002 HC RX W HCPCS: Performed by: INTERNAL MEDICINE

## 2025-01-23 RX ORDER — METOPROLOL TARTRATE 1 MG/ML
5 INJECTION, SOLUTION INTRAVENOUS PRN
Status: ACTIVE | OUTPATIENT
Start: 2025-01-23 | End: 2025-01-24

## 2025-01-23 RX ORDER — ATROPINE SULFATE 0.1 MG/ML
1 INJECTION INTRAVENOUS PRN
Status: ACTIVE | OUTPATIENT
Start: 2025-01-23 | End: 2025-01-24

## 2025-01-23 RX ORDER — ALBUTEROL SULFATE 0.83 MG/ML
2.5 SOLUTION RESPIRATORY (INHALATION) 2 TIMES DAILY PRN
Status: DISCONTINUED | OUTPATIENT
Start: 2025-01-23 | End: 2025-01-25 | Stop reason: HOSPADM

## 2025-01-23 RX ORDER — SODIUM CHLORIDE 9 MG/ML
INJECTION, SOLUTION INTRAVENOUS
Status: ACTIVE | OUTPATIENT
Start: 2025-01-23 | End: 2025-01-24

## 2025-01-23 RX ORDER — DOBUTAMINE HYDROCHLORIDE 200 MG/100ML
10 INJECTION INTRAVENOUS CONTINUOUS PRN
Status: DISPENSED | OUTPATIENT
Start: 2025-01-23 | End: 2025-01-24

## 2025-01-23 RX ADMIN — SULFUR HEXAFLUORIDE 2 ML: 60.7; .19; .19 INJECTION, POWDER, LYOPHILIZED, FOR SUSPENSION INTRAVENOUS; INTRAVESICAL at 11:01

## 2025-01-23 RX ADMIN — DOBUTAMINE HYDROCHLORIDE 10 MCG/KG/MIN: 200 INJECTION INTRAVENOUS at 11:00

## 2025-01-31 ENCOUNTER — HOSPITAL ENCOUNTER (OUTPATIENT)
Dept: PULMONOLOGY | Age: 65
Discharge: HOME | End: 2025-01-31
Attending: INTERNAL MEDICINE
Payer: COMMERCIAL

## 2025-01-31 DIAGNOSIS — R06.02 SHORTNESS OF BREATH: Primary | ICD-10-CM

## 2025-01-31 PROCEDURE — 94060 EVALUATION OF WHEEZING: CPT

## 2025-01-31 PROCEDURE — 6360000002 HC RX W HCPCS: Performed by: INTERNAL MEDICINE

## 2025-01-31 PROCEDURE — 94726 PLETHYSMOGRAPHY LUNG VOLUMES: CPT

## 2025-01-31 PROCEDURE — 94729 DIFFUSING CAPACITY: CPT

## 2025-01-31 RX ORDER — ALBUTEROL SULFATE 0.83 MG/ML
2.5 SOLUTION RESPIRATORY (INHALATION) 2 TIMES DAILY PRN
Status: DISCONTINUED | OUTPATIENT
Start: 2025-01-31 | End: 2025-02-02 | Stop reason: HOSPADM

## 2025-01-31 RX ADMIN — ALBUTEROL SULFATE 2.5 MG: 2.5 SOLUTION RESPIRATORY (INHALATION) at 09:12

## 2025-01-31 NOTE — PROCEDURES
Media Information          Pulmonary Function Study    Interpretation:    The FVC is normal. FEV1 is Normal. FEV1/FVC ratio is Normal. After bronchodilator therapy there was no significant improvement in FEV1.     Total lung capacity is Normal. Residual volume is Normal.      Diffusing lung capacity when corrected for alveolar volume is Normal.         Impression:    Normal pulmonary function.        Rox Watkins MD, FCCP, [facility]

## 2025-02-20 ENCOUNTER — OFFICE VISIT (OUTPATIENT)
Dept: PULMONOLOGY | Age: 65
End: 2025-02-20
Payer: COMMERCIAL

## 2025-02-20 VITALS
SYSTOLIC BLOOD PRESSURE: 125 MMHG | BODY MASS INDEX: 22.71 KG/M2 | HEART RATE: 81 BPM | DIASTOLIC BLOOD PRESSURE: 76 MMHG | OXYGEN SATURATION: 98 % | TEMPERATURE: 97.9 F | WEIGHT: 133 LBS | HEIGHT: 64 IN

## 2025-02-20 DIAGNOSIS — G47.8 NON-RESTORATIVE SLEEP: ICD-10-CM

## 2025-02-20 DIAGNOSIS — R06.02 SHORTNESS OF BREATH: ICD-10-CM

## 2025-02-20 DIAGNOSIS — G47.10 HYPERSOMNIA: ICD-10-CM

## 2025-02-20 DIAGNOSIS — G47.33 MILD OBSTRUCTIVE SLEEP APNEA: Primary | ICD-10-CM

## 2025-02-20 PROCEDURE — 3078F DIAST BP <80 MM HG: CPT | Performed by: INTERNAL MEDICINE

## 2025-02-20 PROCEDURE — 3074F SYST BP LT 130 MM HG: CPT | Performed by: INTERNAL MEDICINE

## 2025-02-20 PROCEDURE — 99214 OFFICE O/P EST MOD 30 MIN: CPT | Performed by: INTERNAL MEDICINE

## 2025-02-20 ASSESSMENT — ENCOUNTER SYMPTOMS
SHORTNESS OF BREATH: 1
ANAL BLEEDING: 0
ABDOMINAL PAIN: 0
RHINORRHEA: 0
BACK PAIN: 0
CHEST TIGHTNESS: 0
ABDOMINAL DISTENTION: 0
APNEA: 1
COUGH: 0
WHEEZING: 0

## 2025-02-20 NOTE — PROGRESS NOTES
Pulmonary and Sleep Medicine    Sabrina Rodgers (:  1960) is a 64 y.o. female,Established patient, here for evaluation of the following chief complaint(s):  Follow-up (6 wk f/u  pft, echo & stress test)      Referring physician:  No referring provider defined for this encounter.     ASSESSMENT/PLAN:  1. Mild obstructive sleep apnea. AHI 12 on HST done 2024  2. Shortness of breath  3. Hypersomnia  4. Non-restorative sleep        Continue current management with the CPAP she is compliant with the CPAP she feels the CPAP helps.    Discussed shortness of breath which at this time seems to be related likely to deconditioning.  Discussed increasing her activity level.       Rox Watkins MD, [facility], [facility]    No follow-ups on file.    SUBJECTIVE/OBJECTIVE:        Patient is here for follow-up on shortness of breath and sleep apnea.  Her CPAP compliance data was reviewed.  My interpretation of the download is that she is using the CPAP on average little over 6 hours a night.  Her apnea-hypopnea index while on the CPAP is about 1 events per hour.  She feels that the CPAP helps.  She continues to feel short of breath occasionally.  Her cardiac ischemic workup was negative.  Her echocardiogram was normal.  Her pulmonary function study, according to my interpretation, was normal.          Continue the following medications as reported by the patient:    Prior to Visit Medications    Medication Sig Taking? Authorizing Provider   atorvastatin (LIPITOR) 10 MG tablet Take 1 tablet by mouth Every Day  Melissa Kenny MD   colesevelam (WELCHOL) 625 MG tablet 2 tablets Orally Twice a day  Melissa Kenny MD   alendronate (FOSAMAX) 70 MG tablet TAKE 1 TABLET BY MOUTH EVERY WEEK  Melissa Kenny MD   modafinil (PROVIGIL) 100 MG tablet Take 1 tablet by mouth daily for 120 days. Max Daily Amount: 100 mg  Rox Watkins MD   gabapentin (NEURONTIN) 300 MG capsule Take 1 capsule by

## 2025-04-08 ENCOUNTER — TRANSCRIBE ORDERS (OUTPATIENT)
Dept: ADMINISTRATIVE | Age: 65
End: 2025-04-08

## 2025-04-08 DIAGNOSIS — Z12.31 ENCOUNTER FOR SCREENING MAMMOGRAM FOR MALIGNANT NEOPLASM OF BREAST: Primary | ICD-10-CM

## 2025-05-21 ENCOUNTER — OFFICE VISIT (OUTPATIENT)
Dept: OBGYN CLINIC | Age: 65
End: 2025-05-21
Payer: COMMERCIAL

## 2025-05-21 VITALS
SYSTOLIC BLOOD PRESSURE: 127 MMHG | HEART RATE: 91 BPM | BODY MASS INDEX: 23.73 KG/M2 | HEIGHT: 64 IN | WEIGHT: 139 LBS | DIASTOLIC BLOOD PRESSURE: 82 MMHG

## 2025-05-21 DIAGNOSIS — L98.9 SKIN LESION: Primary | ICD-10-CM

## 2025-05-21 DIAGNOSIS — I83.893 VARICOSE VEINS OF BOTH LEGS WITH EDEMA: ICD-10-CM

## 2025-05-21 PROCEDURE — 99214 OFFICE O/P EST MOD 30 MIN: CPT

## 2025-05-21 PROCEDURE — 3074F SYST BP LT 130 MM HG: CPT

## 2025-05-21 PROCEDURE — 3079F DIAST BP 80-89 MM HG: CPT

## 2025-05-21 ASSESSMENT — ENCOUNTER SYMPTOMS
CONSTIPATION: 0
SHORTNESS OF BREATH: 0
CHEST TIGHTNESS: 0
RECTAL PAIN: 0
GASTROINTESTINAL NEGATIVE: 1
RESPIRATORY NEGATIVE: 1
NAUSEA: 0
BACK PAIN: 0
ABDOMINAL PAIN: 0
DIARRHEA: 0

## 2025-05-21 NOTE — PROGRESS NOTES
Pt presents today for a bump in her right breast. States she noticed this a month ago and it is very small.
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
## 2025-05-23 ENCOUNTER — TELEPHONE (OUTPATIENT)
Dept: OBGYN CLINIC | Age: 65
End: 2025-05-23

## 2025-05-23 DIAGNOSIS — I83.893 VARICOSE VEINS OF BOTH LEGS WITH EDEMA: Primary | ICD-10-CM

## 2025-05-23 NOTE — TELEPHONE ENCOUNTER
----- Message from NEHEMIAS Shannon CNP sent at 5/21/2025  9:19 PM CDT -----  Please refer to dr north for varicose veins of right lower leg

## 2025-07-30 ENCOUNTER — OFFICE VISIT (OUTPATIENT)
Dept: PULMONOLOGY | Age: 65
End: 2025-07-30
Payer: MEDICARE

## 2025-07-30 VITALS
DIASTOLIC BLOOD PRESSURE: 72 MMHG | BODY MASS INDEX: 23.9 KG/M2 | WEIGHT: 140 LBS | HEART RATE: 90 BPM | OXYGEN SATURATION: 95 % | HEIGHT: 64 IN | TEMPERATURE: 97.7 F | SYSTOLIC BLOOD PRESSURE: 111 MMHG

## 2025-07-30 DIAGNOSIS — G47.8 NON-RESTORATIVE SLEEP: ICD-10-CM

## 2025-07-30 DIAGNOSIS — R06.83 SNORING: ICD-10-CM

## 2025-07-30 DIAGNOSIS — G47.10 HYPERSOMNIA: ICD-10-CM

## 2025-07-30 DIAGNOSIS — R53.83 OTHER FATIGUE: ICD-10-CM

## 2025-07-30 DIAGNOSIS — Z78.9 NEVER SMOKED CIGARETTES: ICD-10-CM

## 2025-07-30 DIAGNOSIS — G47.33 MILD OBSTRUCTIVE SLEEP APNEA: ICD-10-CM

## 2025-07-30 DIAGNOSIS — R94.39 ABNORMAL STRESS TEST: ICD-10-CM

## 2025-07-30 DIAGNOSIS — R06.02 SHORTNESS OF BREATH: Primary | ICD-10-CM

## 2025-07-30 PROCEDURE — 3074F SYST BP LT 130 MM HG: CPT | Performed by: NURSE PRACTITIONER

## 2025-07-30 PROCEDURE — G8399 PT W/DXA RESULTS DOCUMENT: HCPCS | Performed by: NURSE PRACTITIONER

## 2025-07-30 PROCEDURE — 1036F TOBACCO NON-USER: CPT | Performed by: NURSE PRACTITIONER

## 2025-07-30 PROCEDURE — G8427 DOCREV CUR MEDS BY ELIG CLIN: HCPCS | Performed by: NURSE PRACTITIONER

## 2025-07-30 PROCEDURE — 3017F COLORECTAL CA SCREEN DOC REV: CPT | Performed by: NURSE PRACTITIONER

## 2025-07-30 PROCEDURE — G8420 CALC BMI NORM PARAMETERS: HCPCS | Performed by: NURSE PRACTITIONER

## 2025-07-30 PROCEDURE — 1123F ACP DISCUSS/DSCN MKR DOCD: CPT | Performed by: NURSE PRACTITIONER

## 2025-07-30 PROCEDURE — 99214 OFFICE O/P EST MOD 30 MIN: CPT | Performed by: NURSE PRACTITIONER

## 2025-07-30 PROCEDURE — 3078F DIAST BP <80 MM HG: CPT | Performed by: NURSE PRACTITIONER

## 2025-07-30 PROCEDURE — 1090F PRES/ABSN URINE INCON ASSESS: CPT | Performed by: NURSE PRACTITIONER

## 2025-07-30 ASSESSMENT — ENCOUNTER SYMPTOMS
EYES NEGATIVE: 1
APNEA: 1
ALLERGIC/IMMUNOLOGIC NEGATIVE: 1
GASTROINTESTINAL NEGATIVE: 1
SHORTNESS OF BREATH: 1

## 2025-07-30 NOTE — PROGRESS NOTES
Pulmonary and Sleep Medicine    Sabrina Rodgers (:  1960) is a 65 y.o. female,Established patient, here for evaluation of the following chief complaint(s):  Follow-up (6 mo f/u kp)      Referring physician:  No referring provider defined for this encounter.     ASSESSMENT/PLAN:  1. Shortness of breath  -     [facility] [place], Cardiology, [place]  2. Mild obstructive sleep apnea. AHI 12 on HST done 2024  3. Hypersomnia  4. Non-restorative sleep  5. Never smoked cigarettes  6. Snoring  7. Abnormal stress test  -     [facility] [place], Cardiology, [place]  8. Other fatigue  -     [facility] [place]        Stress test was abnormal, we will refer to Cardiology. PFT was normal. Continue management with CPAP as she feels it helps. Discussed importance of compliance. Return in 3 months.        Return in about 3 months (around 10/30/2025).    SUBJECTIVE/OBJECTIVE:        HPI    Patient presents for follow up for obstructive sleep apnea. My interpretation of compliance download is that she is using CPAP an average of 4 hours and 48 minutes per night. Apnea-hypopnea index with CPAP is 1 event per hour. She has not been able to sleep well lately due to leg pain and has not used it as much as usual due to lack of sleep. She is seeing Vascular for her pain. She continues to have shortness of breath and fatigue. She says she can't get enough sleep or rest. Her father had CAD.     Continue the following medications as reported by the patient:    Prior to Visit Medications    Medication Sig Taking? Authorizing Provider   alendronate (FOSAMAX) 70 MG tablet TAKE 1 TABLET BY MOUTH EVERY WEEK Yes ProviderMelissa MD   gabapentin (NEURONTIN) 300 MG capsule Take 1 capsule by mouth 3 times daily. 1 capsule daily (pt reported she's taking them PRN)   Managed by DR. Meadows Yes ProviderMelissa MD   Tirzepatide (MOUNJARO) 10 MG/0.5ML SOPN SC

## 2025-08-06 ENCOUNTER — TELEPHONE (OUTPATIENT)
Dept: PULMONOLOGY | Age: 65
End: 2025-08-06

## 2025-08-13 ENCOUNTER — OFFICE VISIT (OUTPATIENT)
Dept: CARDIOLOGY CLINIC | Age: 65
End: 2025-08-13
Payer: MEDICARE

## 2025-08-13 VITALS
OXYGEN SATURATION: 99 % | DIASTOLIC BLOOD PRESSURE: 62 MMHG | BODY MASS INDEX: 24.07 KG/M2 | SYSTOLIC BLOOD PRESSURE: 116 MMHG | WEIGHT: 141 LBS | HEART RATE: 77 BPM | RESPIRATION RATE: 20 BRPM | HEIGHT: 64 IN

## 2025-08-13 DIAGNOSIS — R94.39 ABNORMAL STRESS TEST: ICD-10-CM

## 2025-08-13 DIAGNOSIS — I10 ESSENTIAL HYPERTENSION: Primary | ICD-10-CM

## 2025-08-13 DIAGNOSIS — Z82.49 FAMILY HISTORY OF EARLY CAD: ICD-10-CM

## 2025-08-13 DIAGNOSIS — R06.02 SHORTNESS OF BREATH: ICD-10-CM

## 2025-08-13 PROCEDURE — G8420 CALC BMI NORM PARAMETERS: HCPCS | Performed by: NURSE PRACTITIONER

## 2025-08-13 PROCEDURE — G8427 DOCREV CUR MEDS BY ELIG CLIN: HCPCS | Performed by: NURSE PRACTITIONER

## 2025-08-13 PROCEDURE — 93000 ELECTROCARDIOGRAM COMPLETE: CPT | Performed by: NURSE PRACTITIONER

## 2025-08-13 PROCEDURE — 1090F PRES/ABSN URINE INCON ASSESS: CPT | Performed by: NURSE PRACTITIONER

## 2025-08-13 PROCEDURE — 1036F TOBACCO NON-USER: CPT | Performed by: NURSE PRACTITIONER

## 2025-08-13 PROCEDURE — 3078F DIAST BP <80 MM HG: CPT | Performed by: NURSE PRACTITIONER

## 2025-08-13 PROCEDURE — 99204 OFFICE O/P NEW MOD 45 MIN: CPT | Performed by: NURSE PRACTITIONER

## 2025-08-13 PROCEDURE — 1123F ACP DISCUSS/DSCN MKR DOCD: CPT | Performed by: NURSE PRACTITIONER

## 2025-08-13 PROCEDURE — 3074F SYST BP LT 130 MM HG: CPT | Performed by: NURSE PRACTITIONER

## 2025-08-13 PROCEDURE — G8399 PT W/DXA RESULTS DOCUMENT: HCPCS | Performed by: NURSE PRACTITIONER

## 2025-08-13 PROCEDURE — 3017F COLORECTAL CA SCREEN DOC REV: CPT | Performed by: NURSE PRACTITIONER

## 2025-08-13 RX ORDER — ROSUVASTATIN CALCIUM 20 MG/1
20 TABLET, COATED ORAL DAILY
COMMUNITY
Start: 2025-08-11

## 2025-08-13 ASSESSMENT — ENCOUNTER SYMPTOMS
WHEEZING: 0
SHORTNESS OF BREATH: 1
SORE THROAT: 0
CHEST TIGHTNESS: 0
COUGH: 0

## 2025-08-14 ENCOUNTER — TELEPHONE (OUTPATIENT)
Dept: CARDIOLOGY CLINIC | Age: 65
End: 2025-08-14

## 2025-08-14 DIAGNOSIS — R94.39 ABNORMAL STRESS TEST: Primary | ICD-10-CM

## 2025-08-26 ENCOUNTER — HOSPITAL ENCOUNTER (OUTPATIENT)
Age: 65
Setting detail: OUTPATIENT SURGERY
Discharge: HOME | End: 2025-08-26
Attending: INTERNAL MEDICINE | Admitting: INTERNAL MEDICINE
Payer: MEDICARE

## 2025-08-26 VITALS
RESPIRATION RATE: 15 BRPM | OXYGEN SATURATION: 100 % | DIASTOLIC BLOOD PRESSURE: 58 MMHG | SYSTOLIC BLOOD PRESSURE: 96 MMHG | WEIGHT: 140 LBS | BODY MASS INDEX: 23.9 KG/M2 | HEIGHT: 64 IN | HEART RATE: 84 BPM | TEMPERATURE: 97.7 F

## 2025-08-26 DIAGNOSIS — R94.39 ABNORMAL STRESS TEST: ICD-10-CM

## 2025-08-26 LAB
ANION GAP SERPL CALCULATED.3IONS-SCNC: 8 MMOL/L (ref 8–16)
BUN SERPL-MCNC: 27 MG/DL (ref 8–23)
CALCIUM SERPL-MCNC: 9 MG/DL (ref 8.8–10.2)
CHLORIDE SERPL-SCNC: 105 MMOL/L (ref 98–107)
CO2 SERPL-SCNC: 26 MMOL/L (ref 22–29)
CREAT SERPL-MCNC: 1.3 MG/DL (ref 0.5–0.9)
ERYTHROCYTE [DISTWIDTH] IN BLOOD BY AUTOMATED COUNT: 13.2 % (ref 11.5–14.5)
GLUCOSE SERPL-MCNC: 98 MG/DL (ref 70–99)
HCT VFR BLD AUTO: 32.4 % (ref 37–47)
HGB BLD-MCNC: 10.8 G/DL (ref 12–16)
MCH RBC QN AUTO: 30.9 PG (ref 27–31)
MCHC RBC AUTO-ENTMCNC: 33.3 G/DL (ref 33–37)
MCV RBC AUTO: 92.6 FL (ref 81–99)
PLATELET # BLD AUTO: 207 K/UL (ref 130–400)
PMV BLD AUTO: 9.3 FL (ref 9.4–12.3)
POTASSIUM SERPL-SCNC: 4.1 MMOL/L (ref 3.5–5.1)
RBC # BLD AUTO: 3.5 M/UL (ref 4.2–5.4)
SODIUM SERPL-SCNC: 139 MMOL/L (ref 136–145)
WBC # BLD AUTO: 4.8 K/UL (ref 4.8–10.8)

## 2025-08-26 PROCEDURE — 99153 MOD SED SAME PHYS/QHP EA: CPT | Performed by: INTERNAL MEDICINE

## 2025-08-26 PROCEDURE — 6370000000 HC RX 637 (ALT 250 FOR IP): Performed by: INTERNAL MEDICINE

## 2025-08-26 PROCEDURE — 6360000004 HC RX CONTRAST MEDICATION: Performed by: INTERNAL MEDICINE

## 2025-08-26 PROCEDURE — 7100000011 HC PHASE II RECOVERY - ADDTL 15 MIN: Performed by: INTERNAL MEDICINE

## 2025-08-26 PROCEDURE — 93799 UNLISTED CV SVC/PROCEDURE: CPT | Performed by: INTERNAL MEDICINE

## 2025-08-26 PROCEDURE — 85347 COAGULATION TIME ACTIVATED: CPT | Performed by: INTERNAL MEDICINE

## 2025-08-26 PROCEDURE — 93571 IV DOP VEL&/PRESS C FLO 1ST: CPT | Performed by: INTERNAL MEDICINE

## 2025-08-26 PROCEDURE — 6360000002 HC RX W HCPCS: Performed by: INTERNAL MEDICINE

## 2025-08-26 PROCEDURE — 2500000003 HC RX 250 WO HCPCS: Performed by: INTERNAL MEDICINE

## 2025-08-26 PROCEDURE — C1894 INTRO/SHEATH, NON-LASER: HCPCS | Performed by: INTERNAL MEDICINE

## 2025-08-26 PROCEDURE — 99152 MOD SED SAME PHYS/QHP 5/>YRS: CPT | Performed by: INTERNAL MEDICINE

## 2025-08-26 PROCEDURE — 93458 L HRT ARTERY/VENTRICLE ANGIO: CPT | Performed by: INTERNAL MEDICINE

## 2025-08-26 PROCEDURE — 80048 BASIC METABOLIC PNL TOTAL CA: CPT

## 2025-08-26 PROCEDURE — 85027 COMPLETE CBC AUTOMATED: CPT

## 2025-08-26 PROCEDURE — 36415 COLL VENOUS BLD VENIPUNCTURE: CPT

## 2025-08-26 PROCEDURE — 2580000003 HC RX 258: Performed by: INTERNAL MEDICINE

## 2025-08-26 PROCEDURE — 7100000010 HC PHASE II RECOVERY - FIRST 15 MIN: Performed by: INTERNAL MEDICINE

## 2025-08-26 PROCEDURE — 2709999900 HC NON-CHARGEABLE SUPPLY: Performed by: INTERNAL MEDICINE

## 2025-08-26 PROCEDURE — C1887 CATHETER, GUIDING: HCPCS | Performed by: INTERNAL MEDICINE

## 2025-08-26 PROCEDURE — C1769 GUIDE WIRE: HCPCS | Performed by: INTERNAL MEDICINE

## 2025-08-26 RX ORDER — FENTANYL CITRATE 50 UG/ML
INJECTION, SOLUTION INTRAMUSCULAR; INTRAVENOUS PRN
Status: DISCONTINUED | OUTPATIENT
Start: 2025-08-26 | End: 2025-08-26 | Stop reason: HOSPADM

## 2025-08-26 RX ORDER — ONDANSETRON 2 MG/ML
4 INJECTION INTRAMUSCULAR; INTRAVENOUS EVERY 6 HOURS PRN
Status: DISCONTINUED | OUTPATIENT
Start: 2025-08-26 | End: 2025-08-26 | Stop reason: HOSPADM

## 2025-08-26 RX ORDER — SODIUM CHLORIDE 9 MG/ML
INJECTION, SOLUTION INTRAVENOUS PRN
Status: DISCONTINUED | OUTPATIENT
Start: 2025-08-26 | End: 2025-08-26 | Stop reason: HOSPADM

## 2025-08-26 RX ORDER — HEPARIN SODIUM 1000 [USP'U]/ML
INJECTION, SOLUTION INTRAVENOUS; SUBCUTANEOUS PRN
Status: DISCONTINUED | OUTPATIENT
Start: 2025-08-26 | End: 2025-08-26 | Stop reason: HOSPADM

## 2025-08-26 RX ORDER — SODIUM CHLORIDE 0.9 % (FLUSH) 0.9 %
5-40 SYRINGE (ML) INJECTION PRN
Status: DISCONTINUED | OUTPATIENT
Start: 2025-08-26 | End: 2025-08-26 | Stop reason: HOSPADM

## 2025-08-26 RX ORDER — SODIUM CHLORIDE 0.9 % (FLUSH) 0.9 %
5-40 SYRINGE (ML) INJECTION EVERY 12 HOURS SCHEDULED
Status: DISCONTINUED | OUTPATIENT
Start: 2025-08-26 | End: 2025-08-26 | Stop reason: HOSPADM

## 2025-08-26 RX ORDER — ASPIRIN 81 MG/1
81 TABLET ORAL DAILY
Qty: 90 TABLET | Refills: 3 | Status: SHIPPED | OUTPATIENT
Start: 2025-08-26

## 2025-08-26 RX ORDER — SODIUM CHLORIDE 9 MG/ML
INJECTION, SOLUTION INTRAVENOUS CONTINUOUS
Status: DISCONTINUED | OUTPATIENT
Start: 2025-08-26 | End: 2025-08-26 | Stop reason: HOSPADM

## 2025-08-26 RX ORDER — NITROGLYCERIN 20 MG/100ML
INJECTION INTRAVENOUS PRN
Status: DISCONTINUED | OUTPATIENT
Start: 2025-08-26 | End: 2025-08-26 | Stop reason: HOSPADM

## 2025-08-26 RX ORDER — ADENOSINE 3 MG/ML
INJECTION, SOLUTION INTRAVENOUS CONTINUOUS PRN
Status: DISCONTINUED | OUTPATIENT
Start: 2025-08-26 | End: 2025-08-26 | Stop reason: HOSPADM

## 2025-08-26 RX ORDER — MULTIVITAMIN WITH IRON
1 TABLET ORAL DAILY
COMMUNITY

## 2025-08-26 RX ORDER — ASPIRIN 81 MG/1
81 TABLET ORAL ONCE
Status: COMPLETED | OUTPATIENT
Start: 2025-08-26 | End: 2025-08-26

## 2025-08-26 RX ORDER — IODIXANOL 320 MG/ML
INJECTION, SOLUTION INTRAVASCULAR PRN
Status: DISCONTINUED | OUTPATIENT
Start: 2025-08-26 | End: 2025-08-26 | Stop reason: HOSPADM

## 2025-08-26 RX ORDER — VERAPAMIL HYDROCHLORIDE 2.5 MG/ML
INJECTION INTRAVENOUS PRN
Status: DISCONTINUED | OUTPATIENT
Start: 2025-08-26 | End: 2025-08-26 | Stop reason: HOSPADM

## 2025-08-26 RX ORDER — FUROSEMIDE 20 MG/1
20 TABLET ORAL DAILY
Qty: 30 TABLET | Refills: 5 | Status: SHIPPED | OUTPATIENT
Start: 2025-08-26

## 2025-08-26 RX ORDER — ACETAMINOPHEN 325 MG/1
650 TABLET ORAL EVERY 4 HOURS PRN
Status: DISCONTINUED | OUTPATIENT
Start: 2025-08-26 | End: 2025-08-26 | Stop reason: HOSPADM

## 2025-08-26 RX ORDER — MIDAZOLAM HYDROCHLORIDE 1 MG/ML
INJECTION, SOLUTION INTRAMUSCULAR; INTRAVENOUS PRN
Status: DISCONTINUED | OUTPATIENT
Start: 2025-08-26 | End: 2025-08-26 | Stop reason: HOSPADM

## 2025-08-26 RX ADMIN — SODIUM CHLORIDE: 0.9 INJECTION, SOLUTION INTRAVENOUS at 08:41

## 2025-08-26 RX ADMIN — ASPIRIN 81 MG: 81 TABLET, COATED ORAL at 08:45

## 2025-08-28 LAB — ECHO BSA: 5.36 M2

## (undated) DEVICE — CHLORAPREP 26ML ORANGE

## (undated) DEVICE — SURGICAL PROCEDURE PACK KNEE TOT DBD CDS LOURDES HOSP LF

## (undated) DEVICE — BAG BND W36XL36IN TRNSPAR POLY GEN PURP W E BND CLSR TIDI

## (undated) DEVICE — KT VLV BIOP DEFENDO SXN AIR/WATER

## (undated) DEVICE — ENDOGATOR AUXILIARY WATER JET CONNECTOR: Brand: ENDOGATOR

## (undated) DEVICE — BRSH CLN CH 1.7MM 230CM 5TO7MM

## (undated) DEVICE — PAD,ABDOMINAL,8"X10",ST,LF: Brand: MEDLINE

## (undated) DEVICE — THE CHANNEL CLEANING BRUSH IS A NYLON FLEXI BRUSH ATTACHED TO A FLEXIBLE PLASTIC SHEATH DESIGNED TO SAFELY REMOVE DEBRIS FROM FLEXIBLE ENDOSCOPES.

## (undated) DEVICE — LIGHT SUCT UNTETHERED SCINTILLANT

## (undated) DEVICE — GLOVE SURG SZ 85 L12IN FNGR THK79MIL GRN LTX FREE

## (undated) DEVICE — BANDAGE COMPR W3INXL15FT BGE E SGL LAYERED CLP CLSR

## (undated) DEVICE — THE SINGLE USE ETRAP – POLYP TRAP IS USED FOR SUCTION RETRIEVAL OF ENDOSCOPICALLY REMOVED POLYPS.: Brand: ETRAP

## (undated) DEVICE — GUIDEWIRE VASC STR 185 CM PRESSURE OMNIWIRE

## (undated) DEVICE — PACK ANT HIP CDS

## (undated) DEVICE — GLOVE SURG SZ 85 CRM LTX FREE POLYISOPRENE POLYMER BEAD ANTI

## (undated) DEVICE — BLADE LARYNSCP HNDL MAC 3 DISP CURAVIEW LED

## (undated) DEVICE — TUBE ET 7MM NSL ORAL BASIC CUF INTMED MURPHY EYE RADPQ MRK

## (undated) DEVICE — SENSR O2 OXIMAX FNGR A/ 18IN NONSTR

## (undated) DEVICE — YANKAUER,BULB TIP WITH VENT: Brand: ARGYLE

## (undated) DEVICE — SUTURE VCRL SZ 3-0 L27IN ABSRB UD L19MM PS-2 3/8 CIR PRIM J427H

## (undated) DEVICE — Device

## (undated) DEVICE — BITEBLOCK SCOPESAVER LG LF

## (undated) DEVICE — CATHETER GUID 6FR L100CM DIA0.071IN NYL SHFT EBU3.5

## (undated) DEVICE — SYSTEM SKIN CLSR 22CM DERMBND PRINEO

## (undated) DEVICE — NEPTUNE E-SEP SMOKE EVACUATION PENCIL, COATED, 70MM BLADE, ROCKER SWITCH: Brand: NEPTUNE E-SEP

## (undated) DEVICE — ADHESIVE SKIN CLOSURE WND 8.661X1.5 IN 22 CM LIQUIBAND SECUR

## (undated) DEVICE — Device: Brand: DEFENDO AIR/WATER/SUCTION AND BIOPSY VALVE

## (undated) DEVICE — SUTURE VCRL SZ 0 L27IN ABSRB UD L36MM CT-1 1/2 CIR J260H

## (undated) DEVICE — SOLUTION IRRIG 3000ML 0.9% SOD CHL USP UROMATIC PLAS CONT

## (undated) DEVICE — SUTURE VCRL SZ 2-0 L36IN ABSRB UD L36MM CT-1 1/2 CIR J945H

## (undated) DEVICE — BANDAID FLEX STRIP 3/4X3IN MEDICHOICE

## (undated) DEVICE — KIT MFLD ISOLATN NACL CNTRST PRT TBNG SPIK W/ PRSS TRNSDUC

## (undated) DEVICE — DRESSING FOAM W4XL12IN SIL RECT ADH WTRPRF FLM BK W/ BORD

## (undated) DEVICE — SHIELD SURG COAX MULT TIP ORIFICE INTERPULSE

## (undated) DEVICE — DUAL CUT SAGITTAL BLADE

## (undated) DEVICE — ELECTRODE DEFIB AD RADIOTRANSPLANTED AD MULTIFUNCTIONAL PHY CTRL

## (undated) DEVICE — ROSA RBTC UNT 20 DROP

## (undated) DEVICE — DRESSING FOAM SELF ADH 20X10 CM ABSORBENT MEPILEX BORDER

## (undated) DEVICE — SUTURE PERMAHAND SZ 0 L30IN NONABSORBABLE BLK SILK BRAID A306H

## (undated) DEVICE — SNAR POLYP CAPTIVATOR MICROHEX 13 240CM

## (undated) DEVICE — PIN FIX L80MM DIA3.2MM STRL FLUT CAS

## (undated) DEVICE — CANNULA NSL W/ O2 DEL AD 4 M

## (undated) DEVICE — KIT ANGIO W/ AT P65 PREM HND CTRL FOR CNTRST DEL ANGIOTOUCH

## (undated) DEVICE — GLOVE SURG SZ 8 L12IN FNGR THK79MIL GRN LTX FREE

## (undated) DEVICE — GOWN,PREVENTION PLUS,XL,ST,24/CS: Brand: MEDLINE

## (undated) DEVICE — MASK,OXYGEN,MED CONC,ADLT,7' TUB, UC: Brand: PENDING

## (undated) DEVICE — SUTURE ABSRB BRAID COAT UD CP NO 2 27IN VCRL J195H

## (undated) DEVICE — INSTRUMENT KIT ORTHOPEDIC KNEE NAVITRACK

## (undated) DEVICE — CATHETER DIAG 5FR L100CM LUMN ID0.047IN JR4 CRV 0 SIDE H

## (undated) DEVICE — TBG SMPL FLTR LINE NASL 02/C02 A/ BX/100

## (undated) DEVICE — BAND COMPR L24CM REG CLR PLAS HEMSTAT EXT HK AND LOOP RETEN

## (undated) DEVICE — CATHETER DIAG 5FR L100CM LUMN ID0.047IN JL3.5 CRV 0 SIDE H

## (undated) DEVICE — CUFF BP 1TB CONN/BAYO A/

## (undated) DEVICE — MSK O2 MD CONCENTR A/ LF 7FT 1P/U

## (undated) DEVICE — TOOL INSRT STRTCH

## (undated) DEVICE — GLOVE SURG SZ 85 L12IN FNGR THK94MIL TRNSLUC YEL LTX

## (undated) DEVICE — PIN FIX L150MM DIA3.2MM FLUT CAS

## (undated) DEVICE — KIT INTRO 6FR L10CM MINI WIRE L45CM DIA0.021IN NDL 21GA ANT

## (undated) DEVICE — MSK O2 CONCENTR/MD CONN/UNIV A/ 7FT DISP

## (undated) DEVICE — TP SXN YANKR W/VENT STRL

## (undated) DEVICE — SHEET,DRAPE,53X77,STERILE: Brand: MEDLINE

## (undated) DEVICE — VALVE ANGIO ID0.096IN BLEEDBK CTRL COPILOT

## (undated) DEVICE — 3M™ IOBAN™ 2 ANTIMICROBIAL INCISE DRAPE 6650EZ: Brand: IOBAN™ 2

## (undated) DEVICE — CEMENT MIXING SYSTEM WITH FEMORAL BREAKWAY NOZZLE: Brand: REVOLUTION

## (undated) DEVICE — GUIDEWIRE VASC J 3 MM 0.035 INX260 CM 10 CM PTFE SS STRT

## (undated) DEVICE — RECIPROCATING BLADE DOUBLE SIDE (74.0 X 0.77MM)

## (undated) DEVICE — COVER POS PERINL POST NS 082501